# Patient Record
Sex: FEMALE | Race: BLACK OR AFRICAN AMERICAN | Employment: OTHER | URBAN - METROPOLITAN AREA
[De-identification: names, ages, dates, MRNs, and addresses within clinical notes are randomized per-mention and may not be internally consistent; named-entity substitution may affect disease eponyms.]

---

## 2017-01-27 DIAGNOSIS — Z12.31 ENCOUNTER FOR SCREENING MAMMOGRAM FOR MALIGNANT NEOPLASM OF BREAST: ICD-10-CM

## 2017-01-27 DIAGNOSIS — Z12.39 BREAST CANCER SCREENING: ICD-10-CM

## 2017-02-15 ENCOUNTER — OFFICE VISIT (OUTPATIENT)
Dept: FAMILY MEDICINE CLINIC | Age: 62
End: 2017-02-15

## 2017-02-15 VITALS
RESPIRATION RATE: 12 BRPM | HEART RATE: 79 BPM | HEIGHT: 66 IN | BODY MASS INDEX: 34.87 KG/M2 | OXYGEN SATURATION: 98 % | TEMPERATURE: 98.2 F | SYSTOLIC BLOOD PRESSURE: 144 MMHG | WEIGHT: 217 LBS | DIASTOLIC BLOOD PRESSURE: 82 MMHG

## 2017-02-15 DIAGNOSIS — E11.21 DIABETIC NEPHROPATHY ASSOCIATED WITH TYPE 2 DIABETES MELLITUS (HCC): ICD-10-CM

## 2017-02-15 DIAGNOSIS — N63.20 MASS OF LEFT BREAST ON MAMMOGRAM: Primary | ICD-10-CM

## 2017-02-15 RX ORDER — ATORVASTATIN CALCIUM 80 MG/1
80 TABLET, FILM COATED ORAL DAILY
COMMUNITY
End: 2018-01-19 | Stop reason: ALTCHOICE

## 2017-02-15 NOTE — PROGRESS NOTES
Patient here for 3 month f/u she had dx mammogram done and was told to f/u with PCP tests were done at [facility] care everywhere has been ran. 1. Have you been to the ER, urgent care clinic since your last visit? Hospitalized since your last visit? No    2. Have you seen or consulted any other health care providers outside of the [address] [place] [address] since your last visit? Include any pap smears or colon screening.  No

## 2017-02-15 NOTE — PATIENT INSTRUCTIONS
Diabetic Nephropathy: Care Instructions  Your Care Instructions  Finding out that your kidneys have been damaged can be very distressing. It may have taken you by surprise, since damage to kidneys usually does not cause symptoms early on. It is normal to feel upset and afraid. Having diabetic nephropathy means that for some time you have had high blood sugar, which damages the kidneys. Healthy kidneys keep protein in your blood, where it belongs. Damaged kidneys do not work the way they should. Your kidneys are letting protein pass into your urine. Sometimes diabetic kidney disease can lead to kidney failure. Your doctor will tell you how you might be able to slow damage to your kidneys. In many cases, prompt and regular treatment can prevent kidney failure. You will need to take medicine and may need to make a number of changes in your normal routines. If you can keep your blood sugar and blood pressure under control and take certain medicines, you may reduce your chance of kidney failure. Follow-up care is a key part of your treatment and safety. Be sure to make and go to all appointments, and call your doctor if you are having problems. It's also a good idea to know your test results and keep a list of the medicines you take. How can you care for yourself at home? · Take your medicines exactly as prescribed. It is very important that you take your insulin or other diabetes medicine as your doctor tells you. Call your doctor if you think you are having a problem with your medicine. · Try to keep blood sugar in your target range. ¨ Eat a variety of foods, with carbohydrate spread out in your meals. Your doctor may restrict your protein. A dietitian can help you plan meals. ¨ If your doctor recommends it, get more exercise. Walking is a good choice. Bit by bit, increase the amount you walk every day. Try for at least 30 minutes on most days of the week.   ¨ Check your blood sugar as often as your doctor recommends. · Take and record your blood pressure at home if your doctor tells you to. Learn the importance of the two measures of blood pressure (such as 130 over 80, or 130/80). To take your blood pressure at home:  ¨ Ask your doctor to check your blood pressure monitor. He or she can make sure that it is accurate and that the cuff fits you. Also ask your doctor to watch you to make sure that you are using it right. ¨ Do not eat, use tobacco products, or use medicine known to raise blood pressure (such as some nasal decongestant sprays) before taking your blood pressure. ¨ Avoid taking your blood pressure if you have just exercised or are nervous or upset. Rest at least 15 minutes before taking a reading. · Eat a low-salt diet to help keep your blood pressure in your target range. · Do not smoke. Smoking raises your risk of many health problems, including diabetic nephropathy. If you need help quitting, talk to your doctor about stop-smoking programs and medicines. These can increase your chances of quitting for good. · Do not take ibuprofen, naproxen, or similar medicines, unless your doctor tells you to. These medicines may make kidney problems worse. When should you call for help? Call 911 anytime you think you may need emergency care. For example, call if:  · You passed out (lost consciousness). Call your doctor now or seek immediate medical care if:  · You have not urinated at all in the last 24 hours. · You have trouble urinating or can urinate only very small amounts. · You are confused or have trouble thinking clearly. · You are very thirsty, lightheaded, or dizzy, or you feel like you may pass out (lose consciousness). · You have a weak, fast heartbeat. · You have swelling in your hands or feet. · You have blood in your urine. · You are extremely tired or weak. Watch closely for changes in your health, and be sure to contact your doctor if you have any problems.   Where can you learn more?  Go to http://lei-aleah.info/. Enter P361 in the search box to learn more about \"Diabetic Nephropathy: Care Instructions. \"  Current as of: April 5, 2016  Content Version: 11.1  © 9467-5699 [employer]. Care instructions adapted under license by [employer] (which disclaims liability or warranty for this information). If you have questions about a medical condition or this instruction, always ask your healthcare professional. [address] any warranty or liability for your use of this information.

## 2017-02-15 NOTE — PROGRESS NOTES
Arun Santos is a 57 y.o. female  presents for follow up. She had mammo done and needs to review results. No Known Allergies  Outpatient Prescriptions Marked as Taking for the 2/15/17 encounter (Office Visit) with Xuan Boone MD   Medication Sig Dispense Refill    atorvastatin (LIPITOR) 80 mg tablet Take 80 mg by mouth daily.  INSULIN GLARGINE,HUM. REC. ANLOG (TOUJEO SOLOSTAR SC) by SubCUTAneous route.  valsartan-hydrochlorothiazide (DIOVAN HCT) 160-12.5 mg per tablet Take 1 Tab by mouth daily. Takes one half in the morning and one half in the evening      metoprolol succinate (TOPROL XL) 50 mg XL tablet Take  by mouth daily.  glimepiride (AMARYL) 4 mg tablet Take  by mouth every morning.  aspirin delayed-release 325 mg tablet Take  by mouth every six (6) hours as needed for Pain.  Cholecalciferol, Vitamin D3, (VITAMIN D3) 1,000 unit cap Take  by mouth.  vit A,C,E-min-herbal comp#221 2,000-1,000-30 unit-mg-unit tbef Take  by mouth.  ZINC ACETATE PO Take  by mouth.  omega-3 fatty acids-vitamin e (FISH OIL) 1,000 mg cap Take 1 Cap by mouth.       BD SINGLE USE SWABS REGULAR padm       BD INSULIN SYRINGE ULTRA-FINE 1 mL 30 x 1/2\" syrg        Patient Active Problem List   Diagnosis Code    Hypertension I10    Type 2 diabetes mellitus ([address] E11.9    Hyperlipidemia E78.5    Diabetic retinopathy associated with type 2 diabetes mellitus ([address] E11.319    Diabetic peripheral neuropathy associated with type 2 diabetes mellitus ([address] E11.42    Charcot's joint arthropathy in type 2 diabetes mellitus ([address] E11.610    Obesity E66.9     Past Medical History   Diagnosis Date    Charcot's joint disease due to secondary diabetes ([address] 2009    Diabetes ([address]     Diabetic peripheral neuropathy ([address]     Diabetic retinopathy ([address]     H/O transfusion of packed red blood cells 2009    Hypercholesterolemia     Hypertension      Social History     Social History    Marital status:      Spouse name: N/A    Number of children: N/A    Years of education: N/A     Social History Main Topics    Smoking status: Never Smoker    Smokeless tobacco: None    Alcohol use Yes      Comment: rare    Drug use: None    Sexual activity: Yes     Partners: Male     Birth control/ protection: None     Other Topics Concern    None     Social History Narrative     Family History   Problem Relation Age of Onset    Hypertension Mother     Stroke Mother      TIA    Arthritis-osteo Mother     Cancer Mother      cytoblastoma    Hypertension Father     Diabetes Father     Heart Disease Father     Cancer Sister      colon    Diabetes Sister         Review of Systems   Constitutional: Negative for chills and fever. Cardiovascular: Negative for chest pain and palpitations. Gastrointestinal: Negative for nausea and vomiting. Musculoskeletal: Negative. Neurological: Negative for headaches. Vitals:    02/15/17 0948   BP: 144/82   Pulse: 79   Resp: 12   Temp: 98.2 °F (36.8 °C)   TempSrc: Oral   SpO2: 98%   Weight: 217 lb (98.4 kg)   Height: 5' 6\" (1.676 m)   PainSc:   0 - No pain       Physical Exam   Constitutional: She is oriented to person, place, and time and well-developed, well-nourished, and in no distress. Cardiovascular: Normal rate, regular rhythm and normal heart sounds. Pulmonary/Chest: Effort normal and breath sounds normal.   Neurological: She is alert and oriented to person, place, and time. Gait normal.   Skin: Skin is warm and dry. Psychiatric: Mood, memory, affect and judgment normal.   Nursing note and vitals reviewed. Assessment/Plan      ICD-10-CM ICD-9-CM    1. Mass of left breast on mammogram N63 611.72 Repeat mammo and sono in 6 mos.    2. Diabetic nephropathy associated with type 2 diabetes mellitus [facility] E11.21 250.40 REFERRAL TO NEPHROLOGY     583.81      I have discussed the diagnosis with the patient and the intended plan of care as seen in the above orders. The patient has received an after-visit summary and questions were answered concerning future plans. I have discussed medication, side effects, and warnings with the patient in detail. The patient verbalized understanding and is in agreement with the plan of care. The patient will contact the office with any additional concerns. Follow-up Disposition:  Return in about 3 months (around 5/15/2017).   lab results and schedule of future lab studies reviewed with patient    Dickson Esquivel MD

## 2017-02-15 NOTE — MR AVS SNAPSHOT
Visit Information Date & Time Provider Department Dept. Phone Encounter #  
 2/15/2017 10:00 AM Micheal Farley MD [facility] [phone] [id number] Follow-up Instructions Return in about 3 months (around 5/15/2017). Upcoming Health Maintenance Date Due Hepatitis C Screening 1955 HEMOGLOBIN A1C Q6M 1955 LIPID PANEL Q1 1955 FOOT EXAM Q1 2/9/1963 MICROALBUMIN Q1 2/9/1963 EYE EXAM RETINAL OR DILATED Q1 2/9/1963 Pneumococcal 19-64 Medium Risk (1 of 1 - PPSV23) 2/9/1972 DTaP/Tdap/Td series (1 - Tdap) 2/9/1974 PAP AKA CERVICAL CYTOLOGY 2/9/1974 FOBT Q 1 YEAR AGE 50-75 2/9/2003 ZOSTER VACCINE AGE 60> 2/9/2013 INFLUENZA AGE 9 TO ADULT 8/1/2016 BREAST CANCER SCRN MAMMOGRAM 1/17/2019 Allergies as of 2/15/2017  Review Complete On: 2/15/2017 By: Micheal Farley MD  
 No Known Allergies Current Immunizations  Never Reviewed No immunizations on file. Not reviewed this visit You Were Diagnosed With   
  
 Codes Comments Mass of left breast on mammogram    -  Primary ICD-10-CM: N63 
ICD-9-CM: 611.72 Diabetic nephropathy associated with type 2 diabetes mellitus [facility]     ICD-10-CM: E11.21 
ICD-9-CM: 250.40, 583.81 Vitals BP Pulse Temp Resp Height(growth percentile) Weight(growth percentile) 144/82 (BP 1 Location: Right arm, BP Patient Position: Sitting) 79 98.2 °F (36.8 °C) (Oral) 12 5' 6\" (1.676 m) 217 lb (98.4 kg) SpO2 BMI OB Status Smoking Status 98% 35.02 kg/m2 Menopause Never Smoker Vitals History BMI and BSA Data Body Mass Index Body Surface Area 35.02 kg/m 2 2.14 m 2 Preferred Pharmacy Pharmacy Name Phone F[id number] - [address], [address] [phone] Your Updated Medication List  
  
   
This list is accurate as of: 2/15/17 10:18 AM.  Always use your most recent med list.  
  
  
  
  
 AMARYL 4 mg tablet Generic drug:  glimepiride Take  by mouth every morning. aspirin delayed-release 325 mg tablet Take  by mouth every six (6) hours as needed for Pain. BD INSULIN SYRINGE ULTRA-FINE 1 mL 30 gauge x 1/2\" Syrg Generic drug:  Insulin Syringe-Needle U-100 BD Single Use Swabs Regular Padm Generic drug:  alcohol swabs DIOVAN -12.5 mg per tablet Generic drug:  valsartan-hydroCHLOROthiazide Take 1 Tab by mouth daily. Takes one half in the morning and one half in the evening FISH OIL 1,000 mg Cap Generic drug:  omega-3 fatty acids-vitamin e Take 1 Cap by mouth. LIPITOR 80 mg tablet Generic drug:  atorvastatin Take 80 mg by mouth daily. TOPROL XL 50 mg XL tablet Generic drug:  metoprolol succinate Take  by mouth daily. TOUJEO SOLOSTAR SC  
by SubCUTAneous route. vit A,C,E-min-herbal comp#221 2,000-1,000-30 unit-mg-unit Tbef Take  by mouth. VITAMIN D3 1,000 unit Cap Generic drug:  cholecalciferol Take  by mouth. ZINC ACETATE PO Take  by mouth. We Performed the Following REFERRAL TO NEPHROLOGY [TQG77 Custom] Comments:  
 Please evaluate patient for diabetic kidney disease. Follow-up Instructions Return in about 3 months (around 5/15/2017). Referral Information Referral ID Referred By Referred To  
  
 [id number] [facility] [address] [place] [address] [facility] [place] [address] Phone: [phone] Fax: [phone] Visits Status Start Date End Date 1 New Request 2/15/17 2/15/18 If your referral has a status of pending review or denied, additional information will be sent to support the outcome of this decision. Patient Instructions Diabetic Nephropathy: Care Instructions Your Care Instructions Finding out that your kidneys have been damaged can be very distressing. It may have taken you by surprise, since damage to kidneys usually does not cause symptoms early on. It is normal to feel upset and afraid. Having diabetic nephropathy means that for some time you have had high blood sugar, which damages the kidneys. Healthy kidneys keep protein in your blood, where it belongs. Damaged kidneys do not work the way they should. Your kidneys are letting protein pass into your urine. Sometimes diabetic kidney disease can lead to kidney failure. Your doctor will tell you how you might be able to slow damage to your kidneys. In many cases, prompt and regular treatment can prevent kidney failure. You will need to take medicine and may need to make a number of changes in your normal routines. If you can keep your blood sugar and blood pressure under control and take certain medicines, you may reduce your chance of kidney failure. Follow-up care is a key part of your treatment and safety. Be sure to make and go to all appointments, and call your doctor if you are having problems. It's also a good idea to know your test results and keep a list of the medicines you take. How can you care for yourself at home? · Take your medicines exactly as prescribed. It is very important that you take your insulin or other diabetes medicine as your doctor tells you. Call your doctor if you think you are having a problem with your medicine. · Try to keep blood sugar in your target range. ¨ Eat a variety of foods, with carbohydrate spread out in your meals. Your doctor may restrict your protein. A dietitian can help you plan meals. ¨ If your doctor recommends it, get more exercise. Walking is a good choice. Bit by bit, increase the amount you walk every day. Try for at least 30 minutes on most days of the week. ¨ Check your blood sugar as often as your doctor recommends. · Take and record your blood pressure at home if your doctor tells you to. Learn the importance of the two measures of blood pressure (such as 130 over 80, or 130/80). To take your blood pressure at home: ¨ Ask your doctor to check your blood pressure monitor. He or she can make sure that it is accurate and that the cuff fits you. Also ask your doctor to watch you to make sure that you are using it right. ¨ Do not eat, use tobacco products, or use medicine known to raise blood pressure (such as some nasal decongestant sprays) before taking your blood pressure. ¨ Avoid taking your blood pressure if you have just exercised or are nervous or upset. Rest at least 15 minutes before taking a reading. · Eat a low-salt diet to help keep your blood pressure in your target range. · Do not smoke. Smoking raises your risk of many health problems, including diabetic nephropathy. If you need help quitting, talk to your doctor about stop-smoking programs and medicines. These can increase your chances of quitting for good. · Do not take ibuprofen, naproxen, or similar medicines, unless your doctor tells you to. These medicines may make kidney problems worse. When should you call for help? Call 911 anytime you think you may need emergency care. For example, call if: 
· You passed out (lost consciousness). Call your doctor now or seek immediate medical care if: 
· You have not urinated at all in the last 24 hours. · You have trouble urinating or can urinate only very small amounts. · You are confused or have trouble thinking clearly. · You are very thirsty, lightheaded, or dizzy, or you feel like you may pass out (lose consciousness). · You have a weak, fast heartbeat. · You have swelling in your hands or feet. · You have blood in your urine. · You are extremely tired or weak. Watch closely for changes in your health, and be sure to contact your doctor if you have any problems. Where can you learn more? Go to http://lei-aleah.info/. Enter P361 in the search box to learn more about \"Diabetic Nephropathy: Care Instructions. \" Current as of: April 5, 2016 Content Version: 11.1 © 9684-1125 [employer]. Care instructions adapted under license by [employer] (which disclaims liability or warranty for this information). If you have questions about a medical condition or this instruction, always ask your healthcare professional. [address] any warranty or liability for your use of this information. Introducing [facility]! Cheryl Ardon introduces LeisureLink patient portal. Now you can access parts of your medical record, email your doctor's office, and request medication refills online. 1. In your internet browser, go to [email or web address] 2. Click on the First Time User? Click Here link in the Sign In box. You will see the New Member Sign Up page. 3. Enter your LeisureLink Access Code exactly as it appears below. You will not need to use this code after youve completed the sign-up process. If you do not sign up before the expiration date, you must request a new code. · LeisureLink Access Code: [email or web address] Expires: 5/16/2017 10:18 AM 
 
4. Enter the last four digits of your Social Security Number (xxxx) and Date of Birth (mm/dd/yyyy) as indicated and click Submit. You will be taken to the next sign-up page. 5. Create a LeisureLink ID. This will be your LeisureLink login ID and cannot be changed, so think of one that is secure and easy to remember. 6. Create a LeisureLink password. You can change your password at any time. 7. Enter your Password Reset Question and Answer. This can be used at a later time if you forget your password. 8. Enter your e-mail address. You will receive e-mail notification when new information is available in [address] 9. Click Sign Up. You can now view and download portions of your medical record. 10. Click the Download Summary menu link to download a portable copy of your medical information. If you have questions, please visit the Frequently Asked Questions section of the CSR website. Remember, CSR is NOT to be used for urgent needs. For medical emergencies, dial 911. Now available from your iPhone and Android! Please provide this summary of care documentation to your next provider. Your primary care clinician is listed as [id number] [facility]. If you have any questions after today's visit, please call [phone].

## 2017-05-22 ENCOUNTER — OFFICE VISIT (OUTPATIENT)
Dept: FAMILY MEDICINE CLINIC | Age: 62
End: 2017-05-22

## 2017-05-22 VITALS
BODY MASS INDEX: 34.87 KG/M2 | DIASTOLIC BLOOD PRESSURE: 84 MMHG | SYSTOLIC BLOOD PRESSURE: 140 MMHG | WEIGHT: 217 LBS | OXYGEN SATURATION: 97 % | TEMPERATURE: 97.7 F | HEIGHT: 66 IN | HEART RATE: 71 BPM | RESPIRATION RATE: 16 BRPM

## 2017-05-22 DIAGNOSIS — E11.610 CHARCOT'S JOINT ARTHROPATHY IN TYPE 2 DIABETES MELLITUS (HCC): ICD-10-CM

## 2017-05-22 DIAGNOSIS — N63.20 MASS OF LEFT BREAST ON MAMMOGRAM: Primary | ICD-10-CM

## 2017-05-22 RX ORDER — BLACK COHOSH ROOT 540 MG
40 CAPSULE ORAL DAILY
COMMUNITY
End: 2021-03-16

## 2017-05-22 NOTE — PROGRESS NOTES
Chief Complaint   Patient presents with    Breast Mass     follow up     1. Have you been to the ER, urgent care clinic since your last visit? Hospitalized since your last visit? No    2. Have you seen or consulted any other health care providers outside of the [address] [place] [address] since your last visit? Include any pap smears or colon screening.  No

## 2017-05-22 NOTE — MR AVS SNAPSHOT
Visit Information Date & Time Provider Department Dept. Phone Encounter #  
 5/22/2017  9:00 AM Lucita Albarado MD [address] [id number] Follow-up Instructions Return in about 3 months (around 8/22/2017). Upcoming Health Maintenance Date Due Hepatitis C Screening 1955 HEMOGLOBIN A1C Q6M 1955 LIPID PANEL Q1 1955 FOOT EXAM Q1 2/9/1963 MICROALBUMIN Q1 2/9/1963 EYE EXAM RETINAL OR DILATED Q1 2/9/1963 Pneumococcal 19-64 Medium Risk (1 of 1 - PPSV23) 2/9/1972 DTaP/Tdap/Td series (1 - Tdap) 2/9/1974 PAP AKA CERVICAL CYTOLOGY 2/9/1974 FOBT Q 1 YEAR AGE 50-75 2/9/2003 ZOSTER VACCINE AGE 60> 2/9/2013 INFLUENZA AGE 9 TO ADULT 8/1/2017 BREAST CANCER SCRN MAMMOGRAM 1/17/2019 Allergies as of 5/22/2017  Review Complete On: 5/22/2017 By: Lucita Albarado MD  
 No Known Allergies Current Immunizations  Never Reviewed No immunizations on file. Not reviewed this visit You Were Diagnosed With   
  
 Codes Comments Mass of left breast on mammogram    -  Primary ICD-10-CM: N63 
ICD-9-CM: 611.72 Vitals BP Pulse Temp Resp Height(growth percentile) Weight(growth percentile) 140/84 71 97.7 °F (36.5 °C) (Temporal) 16 5' 6\" (1.676 m) 217 lb (98.4 kg) SpO2 BMI OB Status Smoking Status 97% 35.02 kg/m2 Menopause Never Smoker Vitals History BMI and BSA Data Body Mass Index Body Surface Area 35.02 kg/m 2 2.14 m 2 Preferred Pharmacy Pharmacy Name Phone [id number] - [place] [address], 1959 [address] [phone] Your Updated Medication List  
  
   
This list is accurate as of: 5/22/17  9:46 AM.  Always use your most recent med list.  
  
  
  
  
 AMARYL 4 mg tablet Generic drug:  glimepiride Take  by mouth every morning. aspirin delayed-release 325 mg tablet Take  by mouth every six (6) hours as needed for Pain. BD INSULIN SYRINGE ULTRA-FINE 1 mL 30 gauge x 1/2\" Syrg Generic drug:  Insulin Syringe-Needle U-100 BD Single Use Swabs Regular Padm Generic drug:  alcohol swabs  
  
 black cohosh 540 mg Cap Take  by mouth. DIOVAN -12.5 mg per tablet Generic drug:  valsartan-hydroCHLOROthiazide Take 1 Tab by mouth daily. Takes one half in the morning and one half in the evening FISH OIL 1,000 mg Cap Generic drug:  omega-3 fatty acids-vitamin e Take 1 Cap by mouth. LIPITOR 80 mg tablet Generic drug:  atorvastatin Take 80 mg by mouth daily. TOPROL XL 50 mg XL tablet Generic drug:  metoprolol succinate Take  by mouth daily. TOUJEO SOLOSTAR SC  
by SubCUTAneous route. vit A,C,E-min-herbal comp#221 2,000-1,000-30 unit-mg-unit Tbef Take  by mouth. VITAMIN D3 1,000 unit Cap Generic drug:  cholecalciferol Take  by mouth. ZINC ACETATE PO Take  by mouth. Follow-up Instructions Return in about 3 months (around 8/22/2017). To-Do List   
 05/22/2017 Imaging:  [facility] MAMMO BI DX INCL CAD   
  
 05/22/2017 Imaging:  US BREAST LT LIMITED=<3 QUAD Patient Instructions Open Breast Biopsy: Before Your Surgery What is an open breast biopsy? An open breast biopsy is surgery to take a sample of breast tissue. It may be done to check a lump found during a breast exam. Or it may be done to check an area of concern found on a mammogram or ultrasound. If the doctor can't feel the lump, a small wire can be put in the area during a mammogram or ultrasound just before surgery. The tip of the wire will guide the doctor to the area to be checked. The doctor will make a small cut in the breast to remove a piece of tissue. The cut is called an incision. If the lump or suspicious area is small, the doctor may be able to take out the entire lump or area. The doctor will close the incision with stitches. The breast tissue will be sent to a lab. There it will be examined under a microscope to check for breast cancer. Your doctor may get some answers right away. But it can take up to 1 to 2 weeks to get the final results. You will be able to go home on the same day as the biopsy. Most women are able to go back to work in 1 or 2 days. This depends on how you feel and the type of work you do. For 2 weeks after surgery, you will need to avoid bouncing and strenuous activities that involve the upper body. The surgery will leave a small scar on your breast that will fade with time. Less often, the surgery may leave a dent in the breast. You may be able to feel a hard area where the biopsy was done. This is a normal part of the healing process. It does not mean that the lump is growing back. The area will get softer in the weeks after surgery. Follow-up care is a key part of your treatment and safety. Be sure to make and go to all appointments, and call your doctor if you are having problems. It's also a good idea to know your test results and keep a list of the medicines you take. What happens before surgery? Surgery can be stressful. This information will help you understand what you can expect. And it will help you safely prepare for surgery. Preparing for surgery · Understand exactly what surgery is planned, along with the risks, benefits, and other options. · Tell your doctors ALL the medicines, vitamins, supplements, and herbal remedies you take. Some of these can increase the risk of bleeding or interact with anesthesia. · If you take blood thinners, such as warfarin (Coumadin), clopidogrel (Plavix), or aspirin, be sure to talk to your doctor. He or she will tell you if you should stop taking these medicines before your surgery. Make sure that you understand exactly what your doctor wants you to do.  
· Your doctor will tell you which medicines to take or stop before your surgery. You may need to stop taking certain medicines a week or more before surgery. So talk to your doctor as soon as you can. · If you have an advance directive, let your doctor know. It may include a living will and a durable power of  for health care. Bring a copy to the hospital. If you don't have one, you may want to prepare one. It lets your doctor and loved ones know your health care wishes. Doctors advise that everyone prepare these papers before any type of surgery or procedure. What happens on the day of surgery? · Follow the instructions exactly about when to stop eating and drinking. If you don't, your surgery may be canceled. If your doctor told you to take your medicines on the day of surgery, take them with only a sip of water. · Take a bath or shower before you come in for your surgery. Do not apply lotions, perfumes, deodorants, or nail polish. · Do not shave the surgical site yourself. · Take off all jewelry and piercings. And take out contact lenses, if you wear them. · Bring a comfortable, supportive bra with you. For the first 3 days after surgery, you will need to wear this all the time, even at night. At the hospital or surgery center · Bring a picture ID. · The area for surgery is often marked to make sure there are no errors. If needed, you will get a mammogram or ultrasound to bessie the area. · You will be kept comfortable and safe by your anesthesia provider. The anesthesia may make you sleep. Or it may just numb the area being worked on. · The surgery will take about 1 hour. Going home · Be sure you have someone to drive you home. Anesthesia and pain medicine make it unsafe for you to drive. · You will be given more specific instructions about recovering from your surgery. They will cover things like diet, wound care, follow-up care, driving, and getting back to your normal routine. When should you call your doctor? · You have questions or concerns. · You don't understand how to prepare for your surgery. · You become ill before the surgery (such as fever, flu, or a cold). · You need to reschedule or have changed your mind about having the surgery. Where can you learn more? Go to http://lei-aleah.info/. Enter N302 in the search box to learn more about \"Open Breast Biopsy: Before Your Surgery. \" Current as of: July 26, 2016 Content Version: 11.2 © 3051-7515 [employer]. Care instructions adapted under license by [employer] (which disclaims liability or warranty for this information). If you have questions about a medical condition or this instruction, always ask your healthcare professional. [address] any warranty or liability for your use of this information. Introducing [facility]! Daja Betancourt introduces [employer] patient portal. Now you can access parts of your medical record, email your doctor's office, and request medication refills online. 1. In your internet browser, go to [email or web address] 2. Click on the First Time User? Click Here link in the Sign In box. You will see the New Member Sign Up page. 3. Enter your [employer] Access Code exactly as it appears below. You will not need to use this code after youve completed the sign-up process. If you do not sign up before the expiration date, you must request a new code. · [employer] Access Code: [email or web address] Expires: 8/20/2017  9:46 AM 
 
4. Enter the last four digits of your Social Security Number (xxxx) and Date of Birth (mm/dd/yyyy) as indicated and click Submit. You will be taken to the next sign-up page. 5. Create a [employer] ID. This will be your [employer] login ID and cannot be changed, so think of one that is secure and easy to remember. 6. Create a [employer] password. You can change your password at any time. 7. Enter your Password Reset Question and Answer.  This can be used at a later time if you forget your password. 8. Enter your e-mail address. You will receive e-mail notification when new information is available in [address] 9. Click Sign Up. You can now view and download portions of your medical record. 10. Click the Download Summary menu link to download a portable copy of your medical information. If you have questions, please visit the Frequently Asked Questions section of the [employer] website. Remember, [employer] is NOT to be used for urgent needs. For medical emergencies, dial 911. Now available from your iPhone and Android! Please provide this summary of care documentation to your next provider. Your primary care clinician is listed as [id number] [facility]. If you have any questions after today's visit, please call [phone].

## 2017-05-22 NOTE — PROGRESS NOTES
Hue Howell is a 57 y.o. female  presents for follow up of nephrologist.  She has no new complaints. She needs follow up for mammogram.      No Known Allergies  Outpatient Prescriptions Marked as Taking for the 5/22/17 encounter (Office Visit) with Jazzy Oliver MD   Medication Sig Dispense Refill    black cohosh 540 mg cap Take  by mouth.  atorvastatin (LIPITOR) 80 mg tablet Take 80 mg by mouth daily.  INSULIN GLARGINE,HUM. REC. ANLOG (TOUJEO SOLOSTAR SC) by SubCUTAneous route.  valsartan-hydrochlorothiazide (DIOVAN HCT) 160-12.5 mg per tablet Take 1 Tab by mouth daily. Takes one half in the morning and one half in the evening      metoprolol succinate (TOPROL XL) 50 mg XL tablet Take  by mouth daily.  glimepiride (AMARYL) 4 mg tablet Take  by mouth every morning.  aspirin delayed-release 325 mg tablet Take  by mouth every six (6) hours as needed for Pain.  Cholecalciferol, Vitamin D3, (VITAMIN D3) 1,000 unit cap Take  by mouth.  vit A,C,E-min-herbal comp#221 2,000-1,000-30 unit-mg-unit tbef Take  by mouth.  ZINC ACETATE PO Take  by mouth.  omega-3 fatty acids-vitamin e (FISH OIL) 1,000 mg cap Take 1 Cap by mouth.       BD SINGLE USE SWABS REGULAR padm       BD INSULIN SYRINGE ULTRA-FINE 1 mL 30 x 1/2\" syrg        Patient Active Problem List   Diagnosis Code    Hypertension I10    Type 2 diabetes mellitus ([address] E11.9    Hyperlipidemia E78.5    Diabetic retinopathy associated with type 2 diabetes mellitus ([address] E11.319    Diabetic peripheral neuropathy associated with type 2 diabetes mellitus ([address] E11.42    Charcot's joint arthropathy in type 2 diabetes mellitus ([address] E11.610    Obesity E66.9    Mass of left breast on mammogram N63     Past Medical History:   Diagnosis Date    Charcot's joint disease due to secondary diabetes ([address] 2009    Diabetes ([address]     Diabetic peripheral neuropathy ([address]     Diabetic retinopathy ([address]     H/O transfusion of packed red blood cells 2009    Hypercholesterolemia     Hypertension      Social History     Social History    Marital status:      Spouse name: N/A    Number of children: N/A    Years of education: N/A     Social History Main Topics    Smoking status: Never Smoker    Smokeless tobacco: None    Alcohol use Yes      Comment: rare    Drug use: None    Sexual activity: Yes     Partners: Male     Birth control/ protection: None     Other Topics Concern    None     Social History Narrative     Family History   Problem Relation Age of Onset    Hypertension Mother     Stroke Mother      TIA    Arthritis-osteo Mother     Cancer Mother      cytoblastoma    Hypertension Father     Diabetes Father     Heart Disease Father     Cancer Sister      colon    Diabetes Sister         Review of Systems   Constitutional: Negative for chills and fever. Respiratory: Negative for cough and shortness of breath. Cardiovascular: Negative for chest pain and palpitations. Gastrointestinal: Negative for nausea and vomiting. Psychiatric/Behavioral: Negative. Vitals:    05/22/17 0912   BP: 140/84   Pulse: 71   Resp: 16   Temp: 97.7 °F (36.5 °C)   TempSrc: Temporal   SpO2: 97%   Weight: 217 lb (98.4 kg)   Height: 5' 6\" (1.676 m)   PainSc:   0 - No pain       Physical Exam   Constitutional: She is oriented to person, place, and time and well-developed, well-nourished, and in no distress. Neck: Normal range of motion. Neck supple. Cardiovascular: Normal rate, regular rhythm and normal heart sounds. Pulmonary/Chest: Effort normal and breath sounds normal.   Neurological: She is alert and oriented to person, place, and time. Gait normal.   Skin: Skin is warm. Nursing note and vitals reviewed. Assessment/Plan      ICD-10-CM ICD-9-CM    1. Mass of left breast on mammogram N63 611.72 [facility]NEY MAMMO BI DX INCL CAD      US BREAST LT LIMITED=<3 QUAD   2.  Charcot's joint arthropathy in type 2 diabetes mellitus ([facility] E11.610 250.60 AMB SUPPLY ORDER     713.5      I have discussed the diagnosis with the patient and the intended plan of care as seen in the above orders. The patient has received an after-visit summary and questions were answered concerning future plans. I have discussed medication, side effects, and warnings with the patient in detail. The patient verbalized understanding and is in agreement with the plan of care. The patient will contact the office with any additional concerns. Follow-up Disposition:  Return in about 3 months (around 8/22/2017).   lab results and schedule of future lab studies reviewed with patient      Vamshi Banks MD

## 2017-05-22 NOTE — PATIENT INSTRUCTIONS
Open Breast Biopsy: Before Your Surgery  What is an open breast biopsy? An open breast biopsy is surgery to take a sample of breast tissue. It may be done to check a lump found during a breast exam. Or it may be done to check an area of concern found on a mammogram or ultrasound. If the doctor can't feel the lump, a small wire can be put in the area during a mammogram or ultrasound just before surgery. The tip of the wire will guide the doctor to the area to be checked. The doctor will make a small cut in the breast to remove a piece of tissue. The cut is called an incision. If the lump or suspicious area is small, the doctor may be able to take out the entire lump or area. The doctor will close the incision with stitches. The breast tissue will be sent to a lab. There it will be examined under a microscope to check for breast cancer. Your doctor may get some answers right away. But it can take up to 1 to 2 weeks to get the final results. You will be able to go home on the same day as the biopsy. Most women are able to go back to work in 1 or 2 days. This depends on how you feel and the type of work you do. For 2 weeks after surgery, you will need to avoid bouncing and strenuous activities that involve the upper body. The surgery will leave a small scar on your breast that will fade with time. Less often, the surgery may leave a dent in the breast. You may be able to feel a hard area where the biopsy was done. This is a normal part of the healing process. It does not mean that the lump is growing back. The area will get softer in the weeks after surgery. Follow-up care is a key part of your treatment and safety. Be sure to make and go to all appointments, and call your doctor if you are having problems. It's also a good idea to know your test results and keep a list of the medicines you take. What happens before surgery? Surgery can be stressful.  This information will help you understand what you can expect. And it will help you safely prepare for surgery. Preparing for surgery  · Understand exactly what surgery is planned, along with the risks, benefits, and other options. · Tell your doctors ALL the medicines, vitamins, supplements, and herbal remedies you take. Some of these can increase the risk of bleeding or interact with anesthesia. · If you take blood thinners, such as warfarin (Coumadin), clopidogrel (Plavix), or aspirin, be sure to talk to your doctor. He or she will tell you if you should stop taking these medicines before your surgery. Make sure that you understand exactly what your doctor wants you to do. · Your doctor will tell you which medicines to take or stop before your surgery. You may need to stop taking certain medicines a week or more before surgery. So talk to your doctor as soon as you can. · If you have an advance directive, let your doctor know. It may include a living will and a durable power of  for health care. Bring a copy to the hospital. If you don't have one, you may want to prepare one. It lets your doctor and loved ones know your health care wishes. Doctors advise that everyone prepare these papers before any type of surgery or procedure. What happens on the day of surgery? · Follow the instructions exactly about when to stop eating and drinking. If you don't, your surgery may be canceled. If your doctor told you to take your medicines on the day of surgery, take them with only a sip of water. · Take a bath or shower before you come in for your surgery. Do not apply lotions, perfumes, deodorants, or nail polish. · Do not shave the surgical site yourself. · Take off all jewelry and piercings. And take out contact lenses, if you wear them. · Bring a comfortable, supportive bra with you. For the first 3 days after surgery, you will need to wear this all the time, even at night. At the hospital or surgery center  · Bring a picture ID.   · The area for surgery is often marked to make sure there are no errors. If needed, you will get a mammogram or ultrasound to bessie the area. · You will be kept comfortable and safe by your anesthesia provider. The anesthesia may make you sleep. Or it may just numb the area being worked on. · The surgery will take about 1 hour. Going home  · Be sure you have someone to drive you home. Anesthesia and pain medicine make it unsafe for you to drive. · You will be given more specific instructions about recovering from your surgery. They will cover things like diet, wound care, follow-up care, driving, and getting back to your normal routine. When should you call your doctor? · You have questions or concerns. · You don't understand how to prepare for your surgery. · You become ill before the surgery (such as fever, flu, or a cold). · You need to reschedule or have changed your mind about having the surgery. Where can you learn more? Go to http://lei-aleah.info/. Enter V103 in the search box to learn more about \"Open Breast Biopsy: Before Your Surgery. \"  Current as of: July 26, 2016  Content Version: 11.2  © 6973-8197 [employer]. Care instructions adapted under license by [employer] (which disclaims liability or warranty for this information). If you have questions about a medical condition or this instruction, always ask your healthcare professional. [address] any warranty or liability for your use of this information.

## 2017-06-20 ENCOUNTER — TELEPHONE (OUTPATIENT)
Dept: FAMILY MEDICINE CLINIC | Age: 62
End: 2017-06-20

## 2017-09-11 DIAGNOSIS — E21.5 PARATHYROID ABNORMALITY (HCC): Primary | ICD-10-CM

## 2017-10-27 ENCOUNTER — OFFICE VISIT (OUTPATIENT)
Dept: FAMILY MEDICINE CLINIC | Age: 62
End: 2017-10-27

## 2017-10-27 VITALS
HEIGHT: 66 IN | RESPIRATION RATE: 10 BRPM | WEIGHT: 214 LBS | BODY MASS INDEX: 34.39 KG/M2 | TEMPERATURE: 97.7 F | DIASTOLIC BLOOD PRESSURE: 72 MMHG | SYSTOLIC BLOOD PRESSURE: 178 MMHG | HEART RATE: 58 BPM | OXYGEN SATURATION: 99 %

## 2017-10-27 DIAGNOSIS — Z12.11 SCREEN FOR COLON CANCER: ICD-10-CM

## 2017-10-27 DIAGNOSIS — Z23 ENCOUNTER FOR IMMUNIZATION: ICD-10-CM

## 2017-10-27 DIAGNOSIS — Z71.89 ACP (ADVANCE CARE PLANNING): ICD-10-CM

## 2017-10-27 DIAGNOSIS — E11.42 DIABETIC PERIPHERAL NEUROPATHY ASSOCIATED WITH TYPE 2 DIABETES MELLITUS (HCC): ICD-10-CM

## 2017-10-27 DIAGNOSIS — Z00.00 MEDICARE ANNUAL WELLNESS VISIT, SUBSEQUENT: Primary | ICD-10-CM

## 2017-10-27 LAB
ALBUMIN UR QL STRIP: 150 MG/L
CREATININE, URINE POC: 50 MG/DL
MICROALBUMIN/CREAT RATIO POC: >300 MG/G

## 2017-10-27 RX ORDER — CLONIDINE HYDROCHLORIDE 0.1 MG/1
TABLET ORAL
Refills: 1 | COMMUNITY
Start: 2017-10-07 | End: 2018-01-19 | Stop reason: ALTCHOICE

## 2017-10-27 RX ORDER — AMLODIPINE BESYLATE 5 MG/1
TABLET ORAL
Refills: 2 | COMMUNITY
Start: 2017-08-08 | End: 2018-01-15 | Stop reason: SDUPTHER

## 2017-10-27 NOTE — PROGRESS NOTES
1. Have you been to the ER, urgent care clinic since your last visit? Hospitalized since your last visit? No    2. Have you seen or consulted any other health care providers outside of the [address] [place] [address] since your last visit? Include any pap smears or colon screening. Yes When: September 2017 Where: Endocrinology Reason for visit: criselda f/u  Health Maintenance reviewed - Albumin, Zoster and flu ordered. Will think about her pap smear. Information on ACP given as well as Fit Test.      This is a Subsequent Medicare Annual Wellness Exam (AWV) (Performed 12 months after IPPE or effective date of Medicare Part B enrollment)    I have reviewed the patient's medical history in detail and updated the computerized patient record. History     Past Medical History:   Diagnosis Date    Charcot's joint disease due to secondary diabetes ([facility] 2009    Diabetes ([facility]     Diabetic peripheral neuropathy ([facility]     Diabetic retinopathy ([facility]     H/O transfusion of packed red blood cells 2009    Hypercholesterolemia     Hypertension       Past Surgical History:   Procedure Laterality Date    HX ORTHOPAEDIC  2011    LT ANKLE-CHARCOT JOINT    HX RETINAL DETACHMENT REPAIR  2003     Current Outpatient Prescriptions   Medication Sig Dispense Refill    amLODIPine (NORVASC) 5 mg tablet   2    cloNIDine HCl (CATAPRES) 0.1 mg tablet   1    black cohosh 540 mg cap Take  by mouth.  atorvastatin (LIPITOR) 80 mg tablet Take 80 mg by mouth daily.  INSULIN GLARGINE,HUM. REC. ANLOG (TOUJEO SOLOSTAR SC) by SubCUTAneous route.  valsartan-hydrochlorothiazide (DIOVAN HCT) 160-12.5 mg per tablet Take 1 Tab by mouth daily. Takes one half in the morning and one half in the evening      metoprolol succinate (TOPROL XL) 50 mg XL tablet Take  by mouth daily.  glimepiride (AMARYL) 4 mg tablet Take  by mouth every morning.  Cholecalciferol, Vitamin D3, (VITAMIN D3) 1,000 unit cap Take  by mouth.       vit A,C,E-min-herbal comp#221 2,000-1,000-30 unit-mg-unit tbef Take  by mouth.  ZINC ACETATE PO Take  by mouth.  omega-3 fatty acids-vitamin e (FISH OIL) 1,000 mg cap Take 1 Cap by mouth.  BD SINGLE USE SWABS REGULAR padm       BD INSULIN SYRINGE ULTRA-FINE 1 mL 30 x 1/2\" syrg       aspirin delayed-release 325 mg tablet Take  by mouth every six (6) hours as needed for Pain. No Known Allergies  Family History   Problem Relation Age of Onset    Hypertension Mother     Stroke Mother      TIA    Arthritis-osteo Mother     Cancer Mother      cytoblastoma    Hypertension Father     Diabetes Father     Heart Disease Father     Cancer Sister      colon    Diabetes Sister      Social History   Substance Use Topics    Smoking status: Never Smoker    Smokeless tobacco: Not on file    Alcohol use Yes      Comment: rare     Patient Active Problem List   Diagnosis Code    Hypertension I10    Type 2 diabetes mellitus ([facility] [facility] E11.9    Hyperlipidemia E78.5    Diabetic retinopathy associated with type 2 diabetes mellitus ([facility] [facility] E11.319    Diabetic peripheral neuropathy associated with type 2 diabetes mellitus ([facility] [facility] E11.42    Charcot's joint arthropathy in type 2 diabetes mellitus ([facility] [facility] E11.610    Obesity E66.9    Mass of left breast on mammogram N63.20       Depression Risk Factor Screening:     PHQ over the last two weeks 10/27/2017   Little interest or pleasure in doing things Not at all   Feeling down, depressed or hopeless Not at all   Total Score PHQ 2 0     Alcohol Risk Factor Screening: You do not drink alcohol or very rarely. Functional Ability and Level of Safety:   Hearing Loss  Hearing is good. Whisper Test done with normal results. Activities of Daily Living  The home contains: grab bars  Patient does total self care    Fall RiskNo flowsheet data found.     Abuse Screen  Patient is not abused    Cognitive Screening   Evaluation of Cognitive Function:  Has your family/caregiver stated any concerns about your memory: no  Normal    Patient Care Team   Patient Care Team:  Sherrell Ulrich MD as PCP - General (Family Practice)  Lisa Baker DPM (Podiatry)  Cash Garzon MD (Ophthalmology)  Nasrin Renteria MD (Ophthalmology)  Bhumi Bassett MD (Endocrinology)    Assessment/Plan   Education and counseling provided:  End-of-Life planning (with patient's consent)    Diagnoses and all orders for this visit:    1. Screen for colon cancer  -     OCCULT BLOOD, IMMUNOASSAY (FIT); Future    2. Encounter for immunization  -     INFLUENZA VIRUS VACCINE QUADRIVALENT, PRESERVATIVE FREE SYRINGE (85527)    3. Diabetic peripheral neuropathy associated with type 2 diabetes mellitus (HCC)  -     AMB POC URINE, MICROALBUMIN, SEMIQUANT (3 RESULTS)    Other orders  -     varicella zoster vacine live (ZOSTAVAX) 19,400 unit/0.65 mL susr injection; 1 Vial by SubCUTAneous route once for 1 dose.       Health Maintenance Due   Topic Date Due    Hepatitis C Screening  1955    HEMOGLOBIN A1C Q6M  1955    LIPID PANEL Q1  1955    FOOT EXAM Q1  02/09/1963    MICROALBUMIN Q1  02/09/1963    EYE EXAM RETINAL OR DILATED Q1  02/09/1963    Pneumococcal 19-64 Medium Risk (1 of 1 - PPSV23) 02/09/1972    DTaP/Tdap/Td series (1 - Tdap) 02/09/1974    PAP AKA CERVICAL CYTOLOGY  02/09/1974    FOBT Q 1 YEAR AGE 50-75  02/09/2003    ZOSTER VACCINE AGE 60>  12/09/2012    INFLUENZA AGE 9 TO ADULT  08/01/2017       Sherrell Ulrich MD

## 2017-10-27 NOTE — ACP (ADVANCE CARE PLANNING)
Advance Care Planning (ACP) Provider Conversation Snapshot    Date of ACP Conversation: 10/27/17  Persons included in Conversation:  patient  Length of ACP Conversation in minutes:  16 minutes    Authorized Decision Maker (if patient is incapable of making informed decisions):    This person is:   Healthcare Agent/Medical Power of  under Advance Directive          For Patients with Decision Making Capacity:   Values/Goals: Exploration of values, goals, and preferences if recovery is not expected, even with continued medical treatment in the event of:  Imminent death  Severe, permanent brain injury    Conversation Outcomes / Follow-Up Plan:   Recommended completion of Advance Directive form after review of ACP materials and conversation with prospective healthcare agent

## 2017-10-27 NOTE — PATIENT INSTRUCTIONS
Medicare Wellness Visit, Female    The best way to live healthy is to have a healthy lifestyle by eating a well-balanced diet, exercising regularly, limiting alcohol and stopping smoking. Regular physical exams and screening tests are another way to keep healthy. Preventive exams provided by your health care provider can find health problems before they become diseases or illnesses. Preventive services including immunizations, screening tests, monitoring and exams can help you take care of your own health. All people over age 72 should have a pneumovax  and and a prevnar shot to prevent pneumonia. These are once in a lifetime unless you and your provider decide differently. All people over 65 should have a yearly flu shot and a tetanus vaccine every 10 years. A bone mass density to screen for osteoporosis or thinning of the bones should be done every 2 years after 65. Screening for diabetes mellitus with a blood sugar test should be done every year. Glaucoma is a disease of the eye due to increased ocular pressure that can lead to blindness and it should be done every year by an eye professional.    Cardiovascular screening tests that check for elevated lipids (fatty part of blood) which can lead to heart disease and strokes should be done every 5 years. Colorectal screening that evaluates for blood or polyps in your colon should be done yearly as a stool test or every five years as a flexible sigmoidoscope or every 10 years as a colonoscopy up to age 76. Breast cancer screening with a mammogram is recommended biennially  for women age 54-69. Screening for cervical cancer with a pap smear and pelvic exam is recommended for women after age 72 years every 2 years up to age 79 or when the provider and patient decide to stop. If there is a history of cervical abnormalities or other increased risk for cancer then the test is recommended yearly.     Hepatitis C screening is also recommended for anyone born between [birth year] through [facility]. A shingles vaccine is also recommended once in a lifetime after age 61. Your Medicare Wellness Exam is recommended annually. Here is a list of your current Health Maintenance items with a due date:  Health Maintenance Due   Topic Date Due    Hepatitis C Test  1955    Hemoglobin A1C    1955    Cholesterol Test   1955    Diabetic Foot Care  02/09/1963    Albumin Urine Test  02/09/1963    Eye Exam  02/09/1963    Pneumococcal Vaccine (1 of 1 - PPSV23) 02/09/1972    DTaP/Tdap/Td  (1 - Tdap) 02/09/1974    Cervical Cancer Screening  02/09/1974    Stool testing for trace blood  02/09/2003    Shingles Vaccine  12/09/2012    Flu Vaccine  08/01/2017          Colon Cancer Screening: Care Instructions  Your Care Instructions    Colorectal cancer occurs in the colon or rectum. That's the lower part of your digestive system. It is the second-leading cause of cancer deaths in the [place]. It often starts with small growths called polyps in the colon or rectum. Polyps are usually found with screening tests. Depending on the type of test, any polyps found may be removed during the tests. Colorectal cancer usually does not cause symptoms at first. But regular tests can help find it early, before it spreads and becomes harder to treat. Experts advise routine tests for colon cancer for people starting at age 48. And they advise people with a higher risk of colon cancer to get tested sooner. Talk with your doctor about when you should start testing. Discuss which tests you need. Follow-up care is a key part of your treatment and safety. Be sure to make and go to all appointments, and call your doctor if you are having problems. It's also a good idea to know your test results and keep a list of the medicines you take. What are the main screening tests for colon cancer? · Stool tests.  These include the fecal immunochemical test (FIT) and the fecal occult blood test (FOBT). These tests check stool samples for signs of cancer. If your test is positive, you will need to have a colonoscopy. · Sigmoidoscopy. This test lets your doctor look at the lining of your rectum and the lowest part of your colon. Your doctor uses a lighted tube called a sigmoidoscope. This test can't find cancers or polyps in the upper part of your colon. In some cases, polyps that are found can be removed. But if your doctor finds polyps, you will need to have a colonoscopy to check the upper part of your colon. · Colonoscopy. This test lets your doctor look at the lining of your rectum and your entire colon. The doctor uses a thin, flexible tool called a colonoscope. It can also be used to remove polyps or get a tissue sample (biopsy). What tests do you need? The following guidelines are for people age 48 and over who are not at high risk for colorectal cancer. You may have at least one of these tests as directed by your doctor. · Fecal immunochemical test (FIT) or fecal occult blood test (FOBT) every year  · Sigmoidoscopy every 5 years  · Colonoscopy every 10 years  If you are age 68 to 80, you can work with your doctor to decide if screening is a good option. If you are age 80 or older, your doctor will likely advise that screening is not helpful. Talk with your doctor about when you need to be tested. And discuss which tests are right for you. Your doctor may recommend earlier or more frequent testing if you:  · Have had colorectal cancer before. · Have had colon polyps. · Have symptoms of colorectal cancer. These include blood in your stool and changes in your bowel habits. · Have a parent, brother or sister, or child with colon polyps or colorectal cancer. · Have a bowel disease. This includes ulcerative colitis and Crohn's disease. · Have a rare polyp syndrome that runs in families, such as familial adenomatous polyposis (FAP).   · Have had radiation treatments to the belly or pelvis. When should you call for help? Watch closely for changes in your health, and be sure to contact your doctor if:  ? · You have any changes in your bowel habits. ? · You have any problems. Where can you learn more? Go to http://lei-aleah.info/. Enter M541 in the search box to learn more about \"Colon Cancer Screening: Care Instructions. \"  Current as of: May 12, 2017  Content Version: 11.4  © 9794-9455 [employer]. Care instructions adapted under license by [employer] (which disclaims liability or warranty for this information). If you have questions about a medical condition or this instruction, always ask your healthcare professional. [address] any warranty or liability for your use of this information.

## 2017-10-27 NOTE — MR AVS SNAPSHOT
Visit Information Date & Time Provider Department Dept. Phone Encounter #  
 10/27/2017  8:30 AM Philip Fritz, [address] [id number] Follow-up Instructions Return in about 3 months (around 1/27/2018). Upcoming Health Maintenance Date Due Hepatitis C Screening 1955 HEMOGLOBIN A1C Q6M 1955 LIPID PANEL Q1 1955 FOOT EXAM Q1 2/9/1963 MICROALBUMIN Q1 2/9/1963 EYE EXAM RETINAL OR DILATED Q1 2/9/1963 Pneumococcal 19-64 Medium Risk (1 of 1 - PPSV23) 2/9/1972 DTaP/Tdap/Td series (1 - Tdap) 2/9/1974 PAP AKA CERVICAL CYTOLOGY 2/9/1974 FOBT Q 1 YEAR AGE 50-75 2/9/2003 ZOSTER VACCINE AGE 60> 12/9/2012 INFLUENZA AGE 9 TO ADULT 8/1/2017 BREAST CANCER SCRN MAMMOGRAM 1/17/2019 Allergies as of 10/27/2017  Review Complete On: 10/27/2017 By: Philip Fritz MD  
 No Known Allergies Current Immunizations  Never Reviewed Name Date Influenza Vaccine (Quad) PF  Incomplete Not reviewed this visit You Were Diagnosed With   
  
 Codes Comments Medicare annual wellness visit, subsequent    -  Primary ICD-10-CM: Z00.00 ICD-9-CM: V70.0 Screen for colon cancer     ICD-10-CM: Z12.11 ICD-9-CM: V76.51 Encounter for immunization     ICD-10-CM: W38 ICD-9-CM: V03.89 Diabetic peripheral neuropathy associated with type 2 diabetes mellitus (HCC)     ICD-10-CM: E11.42 
ICD-9-CM: 250.60, 357.2 Vitals BP Pulse Temp Resp Height(growth percentile) Weight(growth percentile) 178/72 (BP 1 Location: Left arm, BP Patient Position: Sitting) (!) 58 97.7 °F (36.5 °C) (Oral) 10 5' 6\" (1.676 m) 214 lb (97.1 kg) SpO2 BMI OB Status Smoking Status 99% 34.54 kg/m2 Menopause Never Smoker Vitals History BMI and BSA Data Body Mass Index Body Surface Area 34.54 kg/m 2 2.13 m 2 Preferred Pharmacy Pharmacy Name Phone [id number] - [address], [address] [phone] Your Updated Medication List  
  
   
This list is accurate as of: 10/27/17  8:43 AM.  Always use your most recent med list.  
  
  
  
  
 AMARYL 4 mg tablet Generic drug:  glimepiride Take  by mouth every morning. amLODIPine 5 mg tablet Commonly known as:  NORVASC  
  
 aspirin delayed-release 325 mg tablet Take  by mouth every six (6) hours as needed for Pain. BD INSULIN SYRINGE ULTRA-FINE 1 mL 30 gauge x 1/2\" Syrg Generic drug:  Insulin Syringe-Needle U-100 BD Single Use Swabs Regular Padm Generic drug:  alcohol swabs  
  
 black cohosh 540 mg Cap Take  by mouth.  
  
 cloNIDine HCl 0.1 mg tablet Commonly known as:  CATAPRES  
  
 DIOVAN -12.5 mg per tablet Generic drug:  valsartan-hydroCHLOROthiazide Take 1 Tab by mouth daily. Takes one half in the morning and one half in the evening FISH OIL 1,000 mg Cap Generic drug:  omega-3 fatty acids-vitamin e Take 1 Cap by mouth. LIPITOR 80 mg tablet Generic drug:  atorvastatin Take 80 mg by mouth daily. TOPROL XL 50 mg XL tablet Generic drug:  metoprolol succinate Take  by mouth daily. TOUJEO SOLOSTAR SC  
by SubCUTAneous route. varicella zoster vacine live 19,400 unit/0.65 mL Susr injection Commonly known as:  ZOSTAVAX  
1 Vial by SubCUTAneous route once for 1 dose. vit A,C,E-min-herbal comp 221 2,000-1,000-30 unit-mg-unit Tbef Take  by mouth. VITAMIN D3 1,000 unit Cap Generic drug:  cholecalciferol Take  by mouth. ZINC ACETATE PO Take  by mouth. Prescriptions Printed Refills  
 varicella zoster vacine live (ZOSTAVAX) 19,400 unit/0.65 mL susr injection 0 Si Vial by SubCUTAneous route once for 1 dose. Class: Print Route: SubCUTAneous We Performed the Following AMB POC URINE, MICROALBUMIN, SEMIQUANT (3 RESULTS) [99615 CPT(R)] INFLUENZA VIRUS VAC QUAD,SPLIT,PRESV FREE SYRINGE IM [id number] CPT(R)] REFERRAL TO COLON AND RECTAL SURGERY [REF17 Custom] Comments:  
 Please evaluate patient for screening colonoscopy. Sister  of colon cancer. Follow-up Instructions Return in about 3 months (around 2018). Referral Information Referral ID Referred By Referred To  
  
 [id number] Amadou Amaya, [address], [address] [facility], [address] Phone: [phone] Fax: [phone] Visits Status Start Date End Date 1 New Request 10/27/17 10/27/18 If your referral has a status of pending review or denied, additional information will be sent to support the outcome of this decision. Patient Instructions Medicare Wellness Visit, Female The best way to live healthy is to have a healthy lifestyle by eating a well-balanced diet, exercising regularly, limiting alcohol and stopping smoking. Regular physical exams and screening tests are another way to keep healthy. Preventive exams provided by your health care provider can find health problems before they become diseases or illnesses. Preventive services including immunizations, screening tests, monitoring and exams can help you take care of your own health. All people over age 72 should have a pneumovax  and and a prevnar shot to prevent pneumonia. These are once in a lifetime unless you and your provider decide differently. All people over 65 should have a yearly flu shot and a tetanus vaccine every 10 years. A bone mass density to screen for osteoporosis or thinning of the bones should be done every 2 years after 65. Screening for diabetes mellitus with a blood sugar test should be done every year.  
 
Glaucoma is a disease of the eye due to increased ocular pressure that can lead to blindness and it should be done every year by an eye professional. 
 
 Cardiovascular screening tests that check for elevated lipids (fatty part of blood) which can lead to heart disease and strokes should be done every 5 years. Colorectal screening that evaluates for blood or polyps in your colon should be done yearly as a stool test or every five years as a flexible sigmoidoscope or every 10 years as a colonoscopy up to age 76. Breast cancer screening with a mammogram is recommended biennially  for women age 54-69. Screening for cervical cancer with a pap smear and pelvic exam is recommended for women after age 72 years every 2 years up to age 79 or when the provider and patient decide to stop. If there is a history of cervical abnormalities or other increased risk for cancer then the test is recommended yearly. Hepatitis C screening is also recommended for anyone born between 80 through [address]. A shingles vaccine is also recommended once in a lifetime after age 61. Your Medicare Wellness Exam is recommended annually. Here is a list of your current Health Maintenance items with a due date: 
Health Maintenance Due Topic Date Due  
 Hepatitis C Test  1955  Hemoglobin A1C    1955  Cholesterol Test   1955 [address] [place] [address] Diabetic Foot Care  02/09/1963  Albumin Urine Test  02/09/1963 [address] [place] [address] Eye Exam  02/09/1963  Pneumococcal Vaccine (1 of 1 - PPSV23) 02/09/1972  
 DTaP/Tdap/Td  (1 - Tdap) 02/09/1974  Cervical Cancer Screening  02/09/1974  Stool testing for trace blood  02/09/2003  Shingles Vaccine  12/09/2012  Flu Vaccine  08/01/2017 Colon Cancer Screening: Care Instructions Your Care Instructions Colorectal cancer occurs in the colon or rectum. That's the lower part of your digestive system. It is the second-leading cause of cancer deaths in the [place]. It often starts with small growths called polyps in the colon or rectum. Polyps are usually found with screening tests.  Depending on the type of test, any polyps found may be removed during the tests. Colorectal cancer usually does not cause symptoms at first. But regular tests can help find it early, before it spreads and becomes harder to treat. Experts advise routine tests for colon cancer for people starting at age 48. And they advise people with a higher risk of colon cancer to get tested sooner. Talk with your doctor about when you should start testing. Discuss which tests you need. Follow-up care is a key part of your treatment and safety. Be sure to make and go to all appointments, and call your doctor if you are having problems. It's also a good idea to know your test results and keep a list of the medicines you take. What are the main screening tests for colon cancer? · Stool tests. These include the fecal immunochemical test (FIT) and the fecal occult blood test (FOBT). These tests check stool samples for signs of cancer. If your test is positive, you will need to have a colonoscopy. · Sigmoidoscopy. This test lets your doctor look at the lining of your rectum and the lowest part of your colon. Your doctor uses a lighted tube called a sigmoidoscope. This test can't find cancers or polyps in the upper part of your colon. In some cases, polyps that are found can be removed. But if your doctor finds polyps, you will need to have a colonoscopy to check the upper part of your colon. · Colonoscopy. This test lets your doctor look at the lining of your rectum and your entire colon. The doctor uses a thin, flexible tool called a colonoscope. It can also be used to remove polyps or get a tissue sample (biopsy). What tests do you need? The following guidelines are for people age 48 and over who are not at high risk for colorectal cancer. You may have at least one of these tests as directed by your doctor. · Fecal immunochemical test (FIT) or fecal occult blood test (FOBT) every year · Sigmoidoscopy every 5 years · Colonoscopy every 10 years If you are age 68 to 80, you can work with your doctor to decide if screening is a good option. If you are age 80 or older, your doctor will likely advise that screening is not helpful. Talk with your doctor about when you need to be tested. And discuss which tests are right for you. Your doctor may recommend earlier or more frequent testing if you: 
· Have had colorectal cancer before. · Have had colon polyps. · Have symptoms of colorectal cancer. These include blood in your stool and changes in your bowel habits. · Have a parent, brother or sister, or child with colon polyps or colorectal cancer. · Have a bowel disease. This includes ulcerative colitis and Crohn's disease. · Have a rare polyp syndrome that runs in families, such as familial adenomatous polyposis (FAP). · Have had radiation treatments to the belly or pelvis. When should you call for help? Watch closely for changes in your health, and be sure to contact your doctor if: 
? · You have any changes in your bowel habits. ? · You have any problems. Where can you learn more? Go to http://lei-aleah.info/. Enter M541 in the search box to learn more about \"Colon Cancer Screening: Care Instructions. \" Current as of: May 12, 2017 Content Version: 11.4 © 3645-0068 Healthwise, [employer]. Care instructions adapted under license by [employer] (which disclaims liability or warranty for this information). If you have questions about a medical condition or this instruction, always ask your healthcare professional. [place] [address] any warranty or liability for your use of this information. Introducing [facility]! Rosemary Vcikers introduces [employer] patient portal. Now you can access parts of your medical record, email your doctor's office, and request medication refills online. 1. In your internet browser, go to [email or web address] 2. Click on the First Time User? Click Here link in the Sign In box. You will see the New Member Sign Up page. 3. Enter your BigRep Access Code exactly as it appears below. You will not need to use this code after youve completed the sign-up process. If you do not sign up before the expiration date, you must request a new code. · BigRep Access Code: [email or web address] Expires: 1/25/2018  8:38 AM 
 
4. Enter the last four digits of your Social Security Number (xxxx) and Date of Birth (mm/dd/yyyy) as indicated and click Submit. You will be taken to the next sign-up page. 5. Create a BigRep ID. This will be your BigRep login ID and cannot be changed, so think of one that is secure and easy to remember. 6. Create a BigRep password. You can change your password at any time. 7. Enter your Password Reset Question and Answer. This can be used at a later time if you forget your password. 8. Enter your e-mail address. You will receive e-mail notification when new information is available in [address] 9. Click Sign Up. You can now view and download portions of your medical record. 10. Click the Download Summary menu link to download a portable copy of your medical information. If you have questions, please visit the Frequently Asked Questions section of the BigRep website. Remember, BigRep is NOT to be used for urgent needs. For medical emergencies, dial 911. Now available from your iPhone and Android! Please provide this summary of care documentation to your next provider. Your primary care clinician is listed as [address]. If you have any questions after today's visit, please call [phone].

## 2017-11-14 ENCOUNTER — PATIENT OUTREACH (OUTPATIENT)
Dept: FAMILY MEDICINE CLINIC | Age: 62
End: 2017-11-14

## 2017-11-14 NOTE — PROGRESS NOTES
health screening:    Mrs. John Acevedo states she is planning to contact a gynecologist near her home and have her cervical cancer screening done soon. She has been referred to Dr. Maryjean Boeck for colonoscopy (had sister who  of colon cancer) and is awaiting their call. She completed her f/u mammogram @ [address] [place] and I have asked the office staff to request the report. Will continue to follow until all 3 screenings are done and hyperlinked in .

## 2017-12-04 ENCOUNTER — PATIENT OUTREACH (OUTPATIENT)
Dept: FAMILY MEDICINE CLINIC | Age: 62
End: 2017-12-04

## 2017-12-04 NOTE — PROGRESS NOTES
NN health screening:    Noted referral for Dr. Burak Mendiola has been authorized. I have contacted Dr. Blas Garcia office staff to ensure pt gets scheduled. Noted mammogram results updated in HM with report. Will continue to follow until completion of colonoscopy and cervical cancer screening.

## 2018-01-02 ENCOUNTER — PATIENT OUTREACH (OUTPATIENT)
Dept: FAMILY MEDICINE CLINIC | Age: 63
End: 2018-01-02

## 2018-01-03 NOTE — PROGRESS NOTES
health screening:    Mammogram completed and updated in . Scheduled with Dr. Ayde Mead for colonoscopy on January 5th.

## 2018-01-15 ENCOUNTER — PATIENT OUTREACH (OUTPATIENT)
Dept: FAMILY MEDICINE CLINIC | Age: 63
End: 2018-01-15

## 2018-01-15 RX ORDER — HYDRALAZINE HYDROCHLORIDE 25 MG/1
25 TABLET, FILM COATED ORAL 3 TIMES DAILY
COMMUNITY
Start: 2018-01-12 | End: 2018-02-12 | Stop reason: SDUPTHER

## 2018-01-15 RX ORDER — GLIMEPIRIDE 4 MG/1
4 TABLET ORAL DAILY
COMMUNITY
End: 2018-01-19 | Stop reason: ALTCHOICE

## 2018-01-15 RX ORDER — BISMUTH SUBSALICYLATE 262 MG
1 TABLET,CHEWABLE ORAL DAILY
COMMUNITY

## 2018-01-15 RX ORDER — METOLAZONE 2.5 MG/1
2.5 TABLET ORAL DAILY
COMMUNITY
Start: 2018-01-13 | End: 2018-02-12 | Stop reason: SDUPTHER

## 2018-01-15 RX ORDER — ZOLPIDEM TARTRATE 5 MG/1
5 TABLET ORAL
COMMUNITY
Start: 2018-01-12 | End: 2018-01-19 | Stop reason: ALTCHOICE

## 2018-01-15 RX ORDER — METOPROLOL SUCCINATE 50 MG/1
50 TABLET, EXTENDED RELEASE ORAL DAILY
COMMUNITY
Start: 2018-01-12 | End: 2018-01-15 | Stop reason: SDUPTHER

## 2018-01-15 RX ORDER — CLONIDINE HYDROCHLORIDE 0.3 MG/1
0.3 TABLET ORAL 2 TIMES DAILY
COMMUNITY
Start: 2018-01-12 | End: 2018-02-16 | Stop reason: SDUPTHER

## 2018-01-15 RX ORDER — FUROSEMIDE 20 MG/1
20 TABLET ORAL 2 TIMES DAILY
COMMUNITY
Start: 2018-01-12 | End: 2018-01-27

## 2018-01-15 RX ORDER — ASPIRIN 325 MG
500 TABLET, DELAYED RELEASE (ENTERIC COATED) ORAL 2 TIMES DAILY
COMMUNITY

## 2018-01-15 RX ORDER — HYDROCHLOROTHIAZIDE 25 MG/1
25 TABLET ORAL DAILY
COMMUNITY
Start: 2018-01-13 | End: 2018-01-19 | Stop reason: ALTCHOICE

## 2018-01-15 RX ORDER — VALSARTAN 80 MG/1
80 TABLET ORAL DAILY
COMMUNITY
Start: 2018-01-13 | End: 2018-02-12 | Stop reason: SDUPTHER

## 2018-01-15 RX ORDER — AMLODIPINE BESYLATE 10 MG/1
10 TABLET ORAL DAILY
COMMUNITY
Start: 2018-01-13 | End: 2018-02-12 | Stop reason: SDUPTHER

## 2018-01-15 RX ORDER — CLOPIDOGREL BISULFATE 75 MG/1
75 TABLET ORAL DAILY
COMMUNITY
Start: 2018-01-13 | End: 2018-02-12 | Stop reason: SDUPTHER

## 2018-01-15 NOTE — PROGRESS NOTES
Transitions of Care Coordination    Jey Maradiaga was hospitalized at [facility]17 for a new diagnosis of diastolic CHF, CKD IV, renal artery stenosis and discharged to home with [facility]. RRAT not available. Heart Failure Follow Up Call    Josh Doll contacted the patient by telephone to perform CHF Follow Up. Verified  and address as identifiers. Ms Ronny Calle stated \"I'm feeling good. \"  Patient stated she was in the car on the way to [facility] [address] [place] to attend the [address]  and son were accompanying patient to the clinic. Noted Priorities/Plan:  Contact patient later in the week to continue assessment. Daily Weight (document daily weights in flowsheets):   not available. Patient stated she intends to purchase a new scale today. [facility] care mentioned bringing out telehealth equipment. Goals        Heart Failure     Knowledge and adherence medication (ie. action, side effects, missed dose, etc.)      Maintains daily weight.  Understands and adheres to diet. Needs addressed from pathway:   24-48 Hours     Most Recent BMP and CBC:        Recent Labs      18   0510   GLUCOSE  217*   BUN  115*   CALCIUM  9.1   CREAT  4.1*   NA  137   POTASSIUM  5.0   CHLORIDE  101   CO2  19*          Recent Labs      18   0510   WBC  5.9   RBC  3.34*   HEMOGLOBIN  9.4*   HCT  28.1*   MCV  84   MCH  28   MCHC  34   PLATELET  033*   RDW  14.1                      PCP: Dr. Mark Guerrero  Cardiologist: unknown  Specialist: Dr. Lakshmi Villalta, nephrology  Follow up appointment with Cardiology (1-3 days): TBD  Follow up appointment with PCP (within 14 days):  Offered but patient prefers to schedule  Cardiologist consult while IP: No     Palliative consult while IP:    No     EF: 65% on 18  Type of HF:   HFpEF     Cardiac Device present: none      Heart Failure Medications: ACE/ARB, Betablocker, Diuretic     Disposition of patient:  Home with [facility] home care. HH Services/Tele Monitoring:  Pt stated [facility] home care will provide     Social support: , children    Do you have a Scale:    No.  Will obtain. May have telehealth equip. How often do you weigh: To be discussed. Does patient have an Advance Directive:  not on file     Advance Directive scanned into patients chart:  No     Patient reminded that there are physicians on call 24 hours a day / 7 days a week (M-F 5pm to 8am and from Friday 5pm until Monday 8a for the weekend) should the patient have questions or concerns. Patient reminded to call 911 if situation is emergent or patient feels the situation is emergent. Pt verbalizes understanding.

## 2018-01-16 ENCOUNTER — PATIENT OUTREACH (OUTPATIENT)
Dept: FAMILY MEDICINE CLINIC | Age: 63
End: 2018-01-16

## 2018-01-16 NOTE — PROGRESS NOTES
Transitions of Care Coordination    Follow up for hospitalization at [facility] 1/5-1/12/17 for a new diagnosis of diastolic CHF, CKD IV, renal artery stenosis and discharge to home with [facility] home care. Eric Jackson contacted the patient by telephone to perform CHF follow Up. Ms Kalyani Jernigan stated \"I'm doing fine. I went to the HF Clinic at [facility] yesterday. I met with the pharmacist who went over every one of my medications in detail and they're all set up in a pill minder box. I got lots of education and they gave me a scale too. \"  Declined medication reconciliation with this writer. Patient able to state HF zone for today and to outline the zone and what to do if she is in a yellow or red zone. Patient understands to weigh daily and record weight and when to notify cardiology or PCP of weight gain. Noted Priorities/Plan:  Obtain information re appointment with cardiologist.    Daily Weight: 204.6 stable  Zone: green   Signs/Symptoms: Swelling-no SOB-no    Needs addressed from pathway: Week 1    Week 1-4   Provide Daily Disease Management  (NN initiated)  ? Daily weight (Before Breakfast-  Daily Zone Identification (symptom management; weight, edema, SOB, activity/sleep changes)-notify provider immediately as indicated  ? Cardiac Low-sodium Diet (No added sodium; 1500mg as indicated). If  Diabetic: include carbohydrate controlled   ? Fluid restriction (if indicated)  ? Confirm follow-up appointments/transportation. Reschedule if needed. ? Diet/appetite assessment       Monitoring:  ? Home healtth         Have you been to an ER/Hospital since discharge from [facility]? No      Have you followed up with PCP/Cardiologist/Specialist? No.    Appointment with PCP, Dr. Azra Beasley, scheduled today for 1/17/18 at 46. Appointment with vascular, Dr. Epifanio Larsen 2/9/18. Patient stated Dr. Azra Beasley, renal, advised to keep apt with him in about 1.5 months. Transportation:  regular car- drives.   Diet: diabetic/ 2 Gm NA, 1500 cc fluid restriction  Activity/ADLs: independent   Medications Reconciled at this time:  No, patient declined  Home health:  Company/Completion: [facility]: , children    Patient reminded that there is a physician on call 24 hours a day / 7 days a week (M-F 5pm to 8am and from Friday 5pm until Monday 8a for the weekend) should the patient have questions or concerns. Patient reminded to call 911 if situation is emergent or patient feels the situation is emergent.   Pt verbalizes understanding.

## 2018-01-19 ENCOUNTER — OFFICE VISIT (OUTPATIENT)
Dept: FAMILY MEDICINE CLINIC | Age: 63
End: 2018-01-19

## 2018-01-19 VITALS
DIASTOLIC BLOOD PRESSURE: 58 MMHG | SYSTOLIC BLOOD PRESSURE: 150 MMHG | RESPIRATION RATE: 12 BRPM | HEART RATE: 59 BPM | OXYGEN SATURATION: 98 % | TEMPERATURE: 98.5 F | WEIGHT: 293 LBS | BODY MASS INDEX: 47.09 KG/M2 | HEIGHT: 66 IN

## 2018-01-19 DIAGNOSIS — I50.9 ACUTE CONGESTIVE HEART FAILURE, UNSPECIFIED CONGESTIVE HEART FAILURE TYPE: Primary | ICD-10-CM

## 2018-01-19 DIAGNOSIS — E66.01 OBESITY, MORBID (HCC): ICD-10-CM

## 2018-01-19 RX ORDER — ATORVASTATIN CALCIUM 80 MG/1
40 TABLET, FILM COATED ORAL DAILY
COMMUNITY
End: 2020-06-16 | Stop reason: SDUPTHER

## 2018-01-19 NOTE — PROGRESS NOTES
Patient here for f/u on hospital stay at [place] [address] from 1/5/18-1/12/18. She was contacted by a nurse navigator on 1/15/18. 1. Have you been to the ER, urgent care clinic since your last visit? Hospitalized since your last visit? Yes When: 1/5/18-1/12/18 Where: [place] [address] Reason for visit: SOB    2. Have you seen or consulted any other health care providers outside of the [address] [place] [address] since your last visit? Include any pap smears or colon screening. No  Health Maintenance reviewed - Will discuss at her next visit.

## 2018-01-19 NOTE — PATIENT INSTRUCTIONS
Heart Failure: Care Instructions  Your Care Instructions    Heart failure occurs when your heart does not pump as much blood as the body needs. Failure does not mean that the heart has stopped pumping but rather that it is not pumping as well as it should. Over time, this causes fluid buildup in your lungs and other parts of your body. Fluid buildup can cause shortness of breath, fatigue, swollen ankles, and other problems. By taking medicines regularly, reducing sodium (salt) in your diet, checking your weight every day, and making lifestyle changes, you can feel better and live longer. Follow-up care is a key part of your treatment and safety. Be sure to make and go to all appointments, and call your doctor if you are having problems. It's also a good idea to know your test results and keep a list of the medicines you take. How can you care for yourself at home? Medicines  ? · Be safe with medicines. Take your medicines exactly as prescribed. Call your doctor if you think you are having a problem with your medicine. ? · Do not take any vitamins, over-the-counter medicine, or herbal products without talking to your doctor first. Sarahi Valente not take ibuprofen (Advil or Motrin) and naproxen (Aleve) without talking to your doctor first. They could make your heart failure worse. ? · You may be taking some of the following medicine. ¨ Beta-blockers can slow heart rate, decrease blood pressure, and improve your condition. Taking a beta-blocker may lower your chance of needing to be hospitalized. ¨ Angiotensin-converting enzyme inhibitors (ACEIs) reduce the heart's workload, lower blood pressure, and reduce swelling. Taking an ACEI may lower your chance of needing to be hospitalized again. ¨ Angiotensin II receptor blockers (ARBs) work like ACEIs. Your doctor may prescribe them instead of ACEIs. ¨ Diuretics, also called water pills, reduce swelling.   ¨ Potassium supplements replace this important mineral, which is sometimes lost with diuretics. ¨ Aspirin and other blood thinners prevent blood clots, which can cause a stroke or heart attack. ? You will get more details on the specific medicines your doctor prescribes. Diet  ? · Your doctor may suggest that you limit sodium to 2,000 milligrams (mg) a day or less. That is less than 1 teaspoon of salt a day, including all the salt you eat in cooking or in packaged foods. People get most of their sodium from processed foods. Fast food and restaurant meals also tend to be very high in sodium. ? · Ask your doctor how much liquid you can drink each day. You may have to limit liquids. ?Weight  ? · Weigh yourself without clothing at the same time each day. Record your weight. Call your doctor if you have a sudden weight gain, such as more than 2 to 3 pounds in a day or 5 pounds in a week. (Your doctor may suggest a different range of weight gain.) A sudden weight gain may mean that your heart failure is getting worse. ? Activity level  ? · Start light exercise (if your doctor says it is okay). Even if you can only do a small amount, exercise will help you get stronger, have more energy, and manage your weight and your stress. Walking is an easy way to get exercise. Start out by walking a little more than you did before. Bit by bit, increase the amount you walk. ? · When you exercise, watch for signs that your heart is working too hard. You are pushing yourself too hard if you cannot talk while you are exercising. If you become short of breath or dizzy or have chest pain, stop, sit down, and rest.   ? · If you feel \"wiped out\" the day after you exercise, walk slower or for a shorter distance until you can work up to a better pace. ? · Get enough rest at night. Sleeping with 1 or 2 pillows under your upper body and head may help you breathe easier. ? Lifestyle changes  ? · Do not smoke. Smoking can make a heart condition worse.  If you need help quitting, talk to your doctor about stop-smoking programs and medicines. These can increase your chances of quitting for good. Quitting smoking may be the most important step you can take to protect your heart. ? · Limit alcohol to 2 drinks a day for men and 1 drink a day for women. Too much alcohol can cause health problems. ? · Avoid getting sick from colds and the flu. Get a pneumococcal vaccine shot. If you have had one before, ask your doctor whether you need another dose. Get a flu shot each year. If you must be around people with colds or the flu, wash your hands often. When should you call for help? Call 911 if you have symptoms of sudden heart failure such as:  ? · You have severe trouble breathing. ? · You cough up pink, foamy mucus. ? · You have a new irregular or rapid heartbeat. ?Call your doctor now or seek immediate medical care if:  ? · You have new or increased shortness of breath. ? · You are dizzy or lightheaded, or you feel like you may faint. ? · You have sudden weight gain, such as more than 2 to 3 pounds in a day or 5 pounds in a week. (Your doctor may suggest a different range of weight gain.)   ? · You have increased swelling in your legs, ankles, or feet. ? · You are suddenly so tired or weak that you cannot do your usual activities. ? Watch closely for changes in your health, and be sure to contact your doctor if you develop new symptoms. Where can you learn more? Go to http://lei-aleah.info/. Enter W086 in the search box to learn more about \"Heart Failure: Care Instructions. \"  Current as of: September 21, 2016  Content Version: 11.4  © 7912-4709 [employer]. Care instructions adapted under license by [employer] (which disclaims liability or warranty for this information).  If you have questions about a medical condition or this instruction, always ask your healthcare professional. [address] any warranty or liability for your use of this information. Body Mass Index: Care Instructions  Your Care Instructions    Body mass index (BMI) can help you see if your weight is raising your risk for health problems. It uses a formula to compare how much you weigh with how tall you are. · A BMI lower than 18.5 is considered underweight. · A BMI between 18.5 and 24.9 is considered healthy. · A BMI between 25 and 29.9 is considered overweight. A BMI of 30 or higher is considered obese. If your BMI is in the normal range, it means that you have a lower risk for weight-related health problems. If your BMI is in the overweight or obese range, you may be at increased risk for weight-related health problems, such as high blood pressure, heart disease, stroke, arthritis or joint pain, and diabetes. If your BMI is in the underweight range, you may be at increased risk for health problems such as fatigue, lower protection (immunity) against illness, muscle loss, bone loss, hair loss, and hormone problems. BMI is just one measure of your risk for weight-related health problems. You may be at higher risk for health problems if you are not active, you eat an unhealthy diet, or you drink too much alcohol or use tobacco products. Follow-up care is a key part of your treatment and safety. Be sure to make and go to all appointments, and call your doctor if you are having problems. It's also a good idea to know your test results and keep a list of the medicines you take. How can you care for yourself at home? · Practice healthy eating habits. This includes eating plenty of fruits, vegetables, whole grains, lean protein, and low-fat dairy. · If your doctor recommends it, get more exercise. Walking is a good choice. Bit by bit, increase the amount you walk every day. Try for at least 30 minutes on most days of the week. · Do not smoke. Smoking can increase your risk for health problems.  If you need help quitting, talk to your doctor about stop-smoking programs and medicines. These can increase your chances of quitting for good. · Limit alcohol to 2 drinks a day for men and 1 drink a day for women. Too much alcohol can cause health problems. If you have a BMI higher than 25  · Your doctor may do other tests to check your risk for weight-related health problems. This may include measuring the distance around your waist. A waist measurement of more than 40 inches in men or 35 inches in women can increase the risk of weight-related health problems. · Talk with your doctor about steps you can take to stay healthy or improve your health. You may need to make lifestyle changes to lose weight and stay healthy, such as changing your diet and getting regular exercise. If you have a BMI lower than 18.5  · Your doctor may do other tests to check your risk for health problems. · Talk with your doctor about steps you can take to stay healthy or improve your health. You may need to make lifestyle changes to gain or maintain weight and stay healthy, such as getting more healthy foods in your diet and doing exercises to build muscle. Where can you learn more? Go to http://lei-aleah.info/. Enter S176 in the search box to learn more about \"Body Mass Index: Care Instructions. \"  Current as of: October 13, 2016  Content Version: 11.4  © 7007-4004 [employer]. Care instructions adapted under license by [employer] (which disclaims liability or warranty for this information). If you have questions about a medical condition or this instruction, always ask your healthcare professional. [place] [address] any warranty or liability for your use of this information.

## 2018-01-19 NOTE — MR AVS SNAPSHOT
[facility] [address] [place] [address] Road 
[phone] Patient: Soledad Sender MRN: [id number] Visit Information Date & Time Provider Department Dept. Phone Encounter #  
 1/19/2018  1:00 PM Chcuk Castaneda MD [facility] [phone] [id number] Follow-up Instructions Return in about 3 weeks (around 2/9/2018). Upcoming Health Maintenance Date Due Hepatitis C Screening 1955 HEMOGLOBIN A1C Q6M 1955 LIPID PANEL Q1 1955 FOOT EXAM Q1 2/9/1963 EYE EXAM RETINAL OR DILATED Q1 2/9/1963 PAP AKA CERVICAL CYTOLOGY 2/9/1974 FOBT Q 1 YEAR AGE 50-75 2/9/2003 ZOSTER VACCINE AGE 60> 12/9/2012 MICROALBUMIN Q1 10/27/2018 BREAST CANCER SCRN MAMMOGRAM 10/31/2019 DTaP/Tdap/Td series (2 - Td) 1/19/2028 Allergies as of 1/19/2018  Review Complete On: 1/19/2018 By: Chuck Castaneda MD  
 No Known Allergies Current Immunizations  Never Reviewed Name Date Influenza Vaccine (Quad) PF 10/27/2017 Not reviewed this visit You Were Diagnosed With   
  
 Codes Comments Acute congestive heart failure, unspecified [facility]    -  Primary ICD-10-CM: I50.9 ICD-9-CM: 428.0 Vitals BP Pulse Temp Resp Height(growth percentile) Weight(growth percentile) 150/58 (BP 1 Location: Left arm, BP Patient Position: Sitting) (!) 59 98.5 °F (36.9 °C) (Oral) 12 5' 6\" (1.676 m) 298 lb (135.2 kg) SpO2 BMI OB Status Smoking Status 98% 48.1 kg/m2 Menopause Never Smoker Vitals History BMI and BSA Data Body Mass Index Body Surface Area  
 48.1 kg/m 2 2.51 m 2 Preferred Pharmacy Pharmacy Name Phone [id number] - [address] [phone] Your Updated Medication List  
  
   
This list is accurate as of: 1/19/18  1:36 PM.  Always use your most recent med list.  
  
  
  
  
 AMARYL 4 mg tablet Generic drug:  glimepiride Take  by mouth every morning. amLODIPine 10 mg tablet Commonly known as:  Abril Aditya Take 10 mg by mouth daily. aspirin delayed-release 325 mg tablet Take  by mouth every six (6) hours as needed for Pain. atorvastatin 80 mg tablet Commonly known as:  LIPITOR 80 mg.  
  
 BD INSULIN SYRINGE ULTRA-FINE 1 mL 30 gauge x 1/2\" Syrg Generic drug:  Insulin Syringe-Needle U-100 BD Single Use Swabs Regular Padm Generic drug:  alcohol swabs  
  
 black cohosh 540 mg Cap Take  by mouth.  
  
 cloNIDine HCl 0.3 mg tablet Commonly known as:  CATAPRES Take 0.3 mg by mouth two (2) times a day. clopidogrel 75 mg Tab Commonly known as:  PLAVIX Take 75 mg by mouth daily. FISH OIL 60- mg Cap Generic drug:  omega 3-dha-epa-fish oil Take 500 mg by mouth two (2) times a day. furosemide 20 mg tablet Commonly known as:  LASIX Take 20 mg by mouth two (2) times a day. hydrALAZINE 25 mg tablet Commonly known as:  APRESOLINE Take 25 mg by mouth three (3) times daily. metOLazone 2.5 mg tablet Commonly known as:  Aurora Sang Take 2.5 mg by mouth daily. TAB-A-VINCENT tablet Generic drug:  multivitamin Take 1 Tab by mouth daily. TOPROL XL 50 mg XL tablet Generic drug:  metoprolol succinate Take  by mouth daily. * TOUJEO SOLOSTAR SC  
by SubCUTAneous route. * TOUJEO SOLOSTAR 300 unit/mL (1.5 mL) Inpn Generic drug:  insulin glargine  
  
 valsartan 80 mg tablet Commonly known as:  DIOVAN Take 80 mg by mouth daily. VITAMIN A PO Take 1 Tab by mouth daily. VITAMIN D3 1,000 unit Cap Generic drug:  cholecalciferol Take  by mouth. ZINC ACETATE PO Take  by mouth. * Notice: This list has 2 medication(s) that are the same as other medications prescribed for you.  Read the directions carefully, and ask your doctor or other care provider to review them with you. Follow-up Instructions Return in about 3 weeks (around 2/9/2018). Patient Instructions Heart Failure: Care Instructions Your Care Instructions Heart failure occurs when your heart does not pump as much blood as the body needs. Failure does not mean that the heart has stopped pumping but rather that it is not pumping as well as it should. Over time, this causes fluid buildup in your lungs and other parts of your body. Fluid buildup can cause shortness of breath, fatigue, swollen ankles, and other problems. By taking medicines regularly, reducing sodium (salt) in your diet, checking your weight every day, and making lifestyle changes, you can feel better and live longer. Follow-up care is a key part of your treatment and safety. Be sure to make and go to all appointments, and call your doctor if you are having problems. It's also a good idea to know your test results and keep a list of the medicines you take. How can you care for yourself at home? Medicines ? · Be safe with medicines. Take your medicines exactly as prescribed. Call your doctor if you think you are having a problem with your medicine. ? · Do not take any vitamins, over-the-counter medicine, or herbal products without talking to your doctor first. Mark English not take ibuprofen (Advil or Motrin) and naproxen (Aleve) without talking to your doctor first. They could make your heart failure worse. ? · You may be taking some of the following medicine. ¨ Beta-blockers can slow heart rate, decrease blood pressure, and improve your condition. Taking a beta-blocker may lower your chance of needing to be hospitalized. ¨ Angiotensin-converting enzyme inhibitors (ACEIs) reduce the heart's workload, lower blood pressure, and reduce swelling. Taking an ACEI may lower your chance of needing to be hospitalized again. ¨ Angiotensin II receptor blockers (ARBs) work like ACEIs. Your doctor may prescribe them instead of ACEIs. ¨ Diuretics, also called water pills, reduce swelling. ¨ Potassium supplements replace this important mineral, which is sometimes lost with diuretics. ¨ Aspirin and other blood thinners prevent blood clots, which can cause a stroke or heart attack. ? You will get more details on the specific medicines your doctor prescribes. Diet ? · Your doctor may suggest that you limit sodium to 2,000 milligrams (mg) a day or less. That is less than 1 teaspoon of salt a day, including all the salt you eat in cooking or in packaged foods. People get most of their sodium from processed foods. Fast food and restaurant meals also tend to be very high in sodium. ? · Ask your doctor how much liquid you can drink each day. You may have to limit liquids. ?Weight ? · Weigh yourself without clothing at the same time each day. Record your weight. Call your doctor if you have a sudden weight gain, such as more than 2 to 3 pounds in a day or 5 pounds in a week. (Your doctor may suggest a different range of weight gain.) A sudden weight gain may mean that your heart failure is getting worse. ? Activity level ? · Start light exercise (if your doctor says it is okay). Even if you can only do a small amount, exercise will help you get stronger, have more energy, and manage your weight and your stress. Walking is an easy way to get exercise. Start out by walking a little more than you did before. Bit by bit, increase the amount you walk. ? · When you exercise, watch for signs that your heart is working too hard. You are pushing yourself too hard if you cannot talk while you are exercising. If you become short of breath or dizzy or have chest pain, stop, sit down, and rest.  
? · If you feel \"wiped out\" the day after you exercise, walk slower or for a shorter distance until you can work up to a better pace. ? · Get enough rest at night. Sleeping with 1 or 2 pillows under your upper body and head may help you breathe easier. ? Lifestyle changes ? · Do not smoke. Smoking can make a heart condition worse. If you need help quitting, talk to your doctor about stop-smoking programs and medicines. These can increase your chances of quitting for good. Quitting smoking may be the most important step you can take to protect your heart. ? · Limit alcohol to 2 drinks a day for men and 1 drink a day for women. Too much alcohol can cause health problems. ? · Avoid getting sick from colds and the flu. Get a pneumococcal vaccine shot. If you have had one before, ask your doctor whether you need another dose. Get a flu shot each year. If you must be around people with colds or the flu, wash your hands often. When should you call for help? Call 911 if you have symptoms of sudden heart failure such as: 
? · You have severe trouble breathing. ? · You cough up pink, foamy mucus. ? · You have a new irregular or rapid heartbeat. ?Call your doctor now or seek immediate medical care if: 
? · You have new or increased shortness of breath. ? · You are dizzy or lightheaded, or you feel like you may faint. ? · You have sudden weight gain, such as more than 2 to 3 pounds in a day or 5 pounds in a week. (Your doctor may suggest a different range of weight gain.) ? · You have increased swelling in your legs, ankles, or feet. ? · You are suddenly so tired or weak that you cannot do your usual activities. ? Watch closely for changes in your health, and be sure to contact your doctor if you develop new symptoms. Where can you learn more? Go to http://lei-aleah.info/. Enter F820 in the search box to learn more about \"Heart Failure: Care Instructions. \" Current as of: September 21, 2016 Content Version: 11.4 © 8225-9057 [employer]wise[employer].  Care instructions adapted under license by [address] (which disclaims liability or warranty for this information). If you have questions about a medical condition or this instruction, always ask your healthcare professional. [address] any warranty or liability for your use of this information. Introducing [facility]! [facility] introduces [employer] patient portal. Now you can access parts of your medical record, email your doctor's office, and request medication refills online. 1. In your internet browser, go to [email or web address] 2. Click on the First Time User? Click Here link in the Sign In box. You will see the New Member Sign Up page. 3. Enter your [employer] Access Code exactly as it appears below. You will not need to use this code after youve completed the sign-up process. If you do not sign up before the expiration date, you must request a new code. · [employer] Access Code: [email or web address] Expires: 1/25/2018  7:38 AM 
 
4. Enter the last four digits of your Social Security Number (xxxx) and Date of Birth (mm/dd/yyyy) as indicated and click Submit. You will be taken to the next sign-up page. 5. Create a [employer] ID. This will be your [employer] login ID and cannot be changed, so think of one that is secure and easy to remember. 6. Create a [employer] password. You can change your password at any time. 7. Enter your Password Reset Question and Answer. This can be used at a later time if you forget your password. 8. Enter your e-mail address. You will receive e-mail notification when new information is available in [address] 9. Click Sign Up. You can now view and download portions of your medical record. 10. Click the Download Summary menu link to download a portable copy of your medical information. If you have questions, please visit the Frequently Asked Questions section of the [employer] website.  Remember, [employer] is NOT to be used for urgent needs. For medical emergencies, dial 911. Now available from your iPhone and Android! Please provide this summary of care documentation to your next provider. Your primary care clinician is listed as [id number] [facility]. If you have any questions after today's visit, please call [phone].

## 2018-01-19 NOTE — PROGRESS NOTES
Nikolas Rivera is a 57 y.o. female  presents for follow up. She was discharged on 1/12 and nurse navigator called her on 1/13/18. She is feeling better. No SOB. Has occ wheeze. No fever or chills. No Known Allergies  Outpatient Prescriptions Marked as Taking for the 1/19/18 encounter (Office Visit) with Leanne Mcginnis MD   Medication Sig Dispense Refill    atorvastatin (LIPITOR) 80 mg tablet 80 mg.      insulin glargine (TOUJEO SOLOSTAR) 300 unit/mL (1.5 mL) inpn       clopidogrel (PLAVIX) 75 mg tab Take 75 mg by mouth daily.  furosemide (LASIX) 20 mg tablet Take 20 mg by mouth two (2) times a day.  omega 3-dha-epa-fish oil (FISH OIL) 60- mg cap Take 500 mg by mouth two (2) times a day.  hydrALAZINE (APRESOLINE) 25 mg tablet Take 25 mg by mouth three (3) times daily.  metOLazone (ZAROXOLYN) 2.5 mg tablet Take 2.5 mg by mouth daily.  multivitamin (TAB-A-VINCENT) tablet Take 1 Tab by mouth daily.  valsartan (DIOVAN) 80 mg tablet Take 80 mg by mouth daily.  VITAMIN A PO Take 1 Tab by mouth daily.  amLODIPine (NORVASC) 10 mg tablet Take 10 mg by mouth daily.  cloNIDine HCl (CATAPRES) 0.3 mg tablet Take 0.3 mg by mouth two (2) times a day.  black cohosh 540 mg cap Take  by mouth.  INSULIN GLARGINE,HUM. REC. ANLOG (TOUJEO SOLOSTAR SC) by SubCUTAneous route.  metoprolol succinate (TOPROL XL) 50 mg XL tablet Take  by mouth daily.  glimepiride (AMARYL) 4 mg tablet Take  by mouth every morning.  Cholecalciferol, Vitamin D3, (VITAMIN D3) 1,000 unit cap Take  by mouth.  ZINC ACETATE PO Take  by mouth.       BD SINGLE USE SWABS REGULAR padm       BD INSULIN SYRINGE ULTRA-FINE 1 mL 30 x 1/2\" syrg        Patient Active Problem List   Diagnosis Code    Hypertension I10    Type 2 diabetes mellitus ([facility] E11.9    Hyperlipidemia E78.5    Diabetic retinopathy associated with type 2 diabetes mellitus ([facility] E11.319    Diabetic peripheral neuropathy associated with type 2 diabetes mellitus (HCC) E11.42    Charcot's joint arthropathy in type 2 diabetes mellitus ([facility] E11.610    Obesity E66.9    Mass of left breast on mammogram N63.20    ACP (advance care planning) Z71.89     Past Medical History:   Diagnosis Date    Charcot's joint disease due to secondary diabetes ([facility] 2009    Diabetes [facility]     Diabetic peripheral neuropathy ([facility]     Diabetic retinopathy ([facility]     H/O transfusion of packed red blood cells 2009    Hypercholesterolemia     Hypertension      Social History     Social History    Marital status:      Spouse name: N/A    Number of children: N/A    Years of education: N/A     Social History Main Topics    Smoking status: Never Smoker    Smokeless tobacco: Not on file    Alcohol use Yes      Comment: rare    Drug use: Not on file    Sexual activity: Yes     Partners: Male     Birth control/ protection: None     Other Topics Concern    Not on file     Social History Narrative     Family History   Problem Relation Age of Onset    Hypertension Mother     Stroke Mother      TIA    Arthritis-osteo Mother     Cancer Mother      cytoblastoma    Hypertension Father     Diabetes Father     Heart Disease Father     Cancer Sister      colon    Diabetes Sister         Review of Systems   Constitutional: Negative for chills and fever. Respiratory: Negative for cough, shortness of breath and wheezing. Cardiovascular: Negative for chest pain, palpitations and leg swelling. Gastrointestinal: Negative for constipation, diarrhea, nausea and vomiting. Vitals:    01/19/18 1307   BP: 150/58   Pulse: (!) 59   Resp: 12   Temp: 98.5 °F (36.9 °C)   TempSrc: Oral   SpO2: 98%   Weight: 298 lb (135.2 kg)   Height: 5' 6\" (1.676 m)   PainSc:   0 - No pain       Physical Exam   Constitutional: She is oriented to person, place, and time and well-developed, well-nourished, and in no distress. Neck: Normal range of motion. Neck supple. Cardiovascular: Normal rate, regular rhythm and normal heart sounds. Pulmonary/Chest: Effort normal and breath sounds normal.   Neurological: She is alert and oriented to person, place, and time. Skin: Skin is warm and dry. Psychiatric: Mood, memory, affect and judgment normal.   Nursing note and vitals reviewed. Assessment/Plan      ICD-10-CM ICD-9-CM    1. Acute congestive heart failure, unspecified congestive heart failure type (HCC) I50.9 428.0    2. Obesity, morbid ([facility] E66.01 278.01      Will continue current tx    Follow-up Disposition:  Return in about 3 weeks (around 2/9/2018). lab results and schedule of future lab studies reviewed with patient    I have discussed the diagnosis with the patient and the intended plan of care as seen in the above orders. The patient has received an after-visit summary and questions were answered concerning future plans. I have discussed medication, side effects, and warnings with the patient in detail. The patient verbalized understanding and is in agreement with the plan of care. The patient will contact the office with any additional concerns. Discussed the patient's BMI with her. The BMI follow up plan is as follows:     dietary management education, guidance, and counseling  encourage exercise  monitor weight  prescribed dietary intake    An After Visit Summary was printed and given to the patient.     Alexandra Cohen MD

## 2018-01-24 ENCOUNTER — PATIENT OUTREACH (OUTPATIENT)
Dept: FAMILY MEDICINE CLINIC | Age: 63
End: 2018-01-24

## 2018-01-24 NOTE — PROGRESS NOTES
Transitions of Care Coordination    Follow up for hospitalization at [facility] 1/5-1/12/17 for a new diagnosis of diastolic CHF, CKD IV, renal artery stenosis and discharge to home with [facility] home care. Kaye Todd contacted the patient by telephone to perform CHF follow Up. Noted Priorities/Plan:  Obtain appointment with cardiologist.  Patient stated she will attend the HF Clinic at [facility] on 1/25/18 and was told the clinic will tell her about her cardiology appointment at that time. Daily Weight: 203.8  Zone: green   Signs/Symptoms: none    Needs addressed from pathway:     Week 1-4   Provide Daily Disease Management  (NN initiated)  ? Daily weight (Before Breakfast-  Daily Zone Identification (symptom management; weight, edema, SOB, activity/sleep changes)-notify provider immediately as indicated  ? Cardiac Low-sodium Diet (No added sodium; 1500mg as indicated). If  Diabetic: include carbohydrate controlled   ? Fluid restriction (if indicated)  ? Confirm follow-up appointments/transportation. Reschedule if needed. Additional assessment   ? Activity tolerance assessment   ? Medication Therapy   ? Diet/appetite assessment  ? ED/Hospital utilization  Psychosocial: Reassurance/emotional support   Monitoring:  ? Tele-monitoring    Education:    ? Support system identification ( eg: Caregiver, meal planning, community resources, family, friends, [employer], support group)   ? Health literacy for heart failure     ? Have you been to an ER/Hospital since discharge from [facility]? No      Have you followed up with PCP/Cardiologist/Specialist? PCP on 1/17/18    Transportation:  family owned car driven by . Diet: 2GM NA, diabetic, 1500 cc fluid restriction  Activity/ADLs: independent with ADLs. Exercises at the [facility] 1 x week. Medications Reconciled at this time:  Endorsed taking medications as directed  Home health:  Company/Completion: [facility] care.   Service completed as patient is not home bound. Social Support: spouse, family    Upcoming appointments:  Dr. Zo Rodriguez, nephrology, on 2/2/18; Dr. Gilmer Martinez, vascular, 2/9/18      Patient reminded that there is a physician on call 24 hours a day / 7 days a week (M-F 5pm to 8am and from Friday 5pm until Monday 8a for the weekend) should the patient have questions or concerns. Patient reminded to call 911 if situation is emergent or patient feels the situation is emergent. Pt verbalizes understanding. Goals Addressed             Most Recent       Heart Failure     Maintains daily weight. On track (1/24/2018)             Received new scale for HF clinic on 1/15/18       Understand CHF action plan. On track (1/24/2018)             1/16/18-Patient able to describe HF zones and action to take       Understands and adheres to diet. On track (1/24/2018)             1/16/17-Patient able to state diet-diabetic/2GM sodium/1500 cc fluid restriction         Post Hospitalization     Supportive resources in place to maintain patient in the community (ie. Home Health, DME equipment, refer to, medication assistant plan, etc.)                [facility] on 1/13/18.   1/24/18-Nevaeh EMANUEL completed.   Pt not homebound-attending HF Clinic at [facility]

## 2018-01-31 ENCOUNTER — PATIENT OUTREACH (OUTPATIENT)
Dept: FAMILY MEDICINE CLINIC | Age: 63
End: 2018-01-31

## 2018-01-31 NOTE — PROGRESS NOTES
Transitions of Care Coordination    Follow up for hospitalization at [facility] 1/5-1/12/17 for a new diagnosis of diastolic CHF, CKD IV, renal artery stenosis and discharge to home with [facility] care. Brandon Epstein contacted the patient by telephone to perform CHF follow Up. Noted Priorities/Plan:  Continue to work on meal planning. Attend upcoming appointments. Daily Weight: 204 pounds  Zone: green   Signs/Symptoms: none, sleeps on one flat pillow. Needs addressed from pathway: (document the week that pt is in the pathway) Week 3    Week 1-4   Provide Daily Disease Management  (NN initiated)  ? Daily weight (Before Breakfast-  Daily Zone Identification (symptom management; weight, edema, SOB, activity/sleep changes)-notify provider immediately as indicated  ? Cardiac Low-sodium Diet (No added sodium; 1500mg as indicated). If  Diabetic: include carbohydrate controlled   Fluid restriction (if indicated)  ? Comorbidity Management  ? Confirm follow-up appointments/transportation. Additional assessment   ? Activity tolerance assessment   ? Mediication Therapy:  Taking medictions as directed  ? Diet/appetite assessment  ? ED/Hospital utilization    Psychosocial: Reassurance/emotional support   Monitoring:  Home scale and BP cuff. Weighs daily and takes BP. Education:  ? Fluid status-Adherent to 1500 cc Fluid restriction         Have you been to an ER/Hospital since discharge from ? No      Have you followed up with PCP/Cardiologist/Specialist?  Attended PCP apt 1/19/18  Upcoming apts:  Cardiology 2/14/18-patient unable to remember name  Nephrology, Dr. Zo Rodriguez, 2/2/18  Vascular, Dr. Gilmer Martinez, 2/9/18  PCP, Dr. Zo Rodriguez, 2/12/18    Transportation:  family owned car driven by . Diet: 2GM NA, diabetic, 1500 cc fluid restriction  Activity/ADLs: independent with ADLs. Exercises at the [facility] 1 x week.    Medications Reconciled at this time:  Endorsed taking medications as directed  Home health: Company/Completion: [facility] care. Service completed as patient is not home bound. Social Support: spouse, family      Patient reminded that there is a physician on call 24 hours a day / 7 days a week (M-F 5pm to 8am and from Friday 5pm until Monday 8a for the weekend) should the patient have questions or concerns. Patient reminded to call 911 if situation is emergent or patient feels the situation is emergent. Pt verbalizes understanding. Goals Addressed             Most Recent       Heart Failure     Knowledge and adherence medication (ie. action, side effects, missed dose, etc.)   On track (1/31/2018)             1/15/18-Medications reviewed and organized into pill minder box by pharmacist at [address] [place] [address] endorsed taking as directed.  Maintains daily weight. On track (1/31/2018)             Received new scale for HF clinic on 1/15/18       Understand CHF action plan. On track (1/31/2018)             1/16/18-Patient able to describe HF zones and action to take       Understands and adheres to diet.    On track (1/31/2018)             1/16/17-Patient able to state diet-diabetic/2GM sodium/1500 cc fluid restriction

## 2018-02-02 ENCOUNTER — PATIENT OUTREACH (OUTPATIENT)
Dept: FAMILY MEDICINE CLINIC | Age: 63
End: 2018-02-02

## 2018-02-02 NOTE — PROGRESS NOTES
NN health screenings:    Noted patients recent admission and discharge from [place] [address] with new onset HFpEF. Will attempt to call Ms Lili Duarte @ a later date to pursue completion of CRC/Cervical cancer screenings.

## 2018-02-08 ENCOUNTER — PATIENT OUTREACH (OUTPATIENT)
Dept: FAMILY MEDICINE CLINIC | Age: 63
End: 2018-02-08

## 2018-02-08 NOTE — PROGRESS NOTES
Transitions of Care Coordination    Follow up for hospitalization at [facility] 1/5-1/12/17 for a new diagnosis of diastolic CHF, CKD IV, renal artery stenosis and discharge to home with [facility] care. Maira Calhoun contacted the patient by telephone to perform CHF follow Up. Noted Priorities/Plan:  Attend follow up appointments, continue with diet modifications    Daily Weight: 204 lbs decrease  Zone: green   Signs/Symptoms: no swelling or SOB; how many pillows to sleep? 1    Needs addressed from pathway: (document the week that pt is in the pathway) Week 4    Week 1-4   Provide Daily Disease Management  (NN initiated)  ? Daily weight (Before Breakfast-  Daily Zone Identification (symptom management; weight, edema, SOB, activity/sleep changes)-notify provider immediately as indicated  ? Cardiac Low-sodium Diet (No added sodium; 1500mg as indicated). If  Diabetic: include carbohydrate controlled   ? Fluid restriction (if indicated)  ? Comorbidity Management  ? Confirm follow-up appointments/transportation. Reschedule if needed. Additional assessment      ? Activity tolerance assessment   (eg: Vital signs; level of consciousness; dyspnea on exertion; pillow usage; recliner vs bed)   ? Energy conservation management (balance activity with rest)  ? Labs/diagnostics *as indicated  ? Medication Therapy *as directed  ? Diet/appetite assessment  Psychosocial: Reassurance/emotional support   Monitoring:  ? Home scale and BP cuff. Weighs daily and takes BP             Have you been to an ER/Hospital since discharge from [facility]? No      Have you followed up with PCP/Cardiologist/Specialist?   Nephrology, Dr. Sujatha Philip, 2/2/18    Upcoming apts:  Vascular, Dr. Anup Murphy, 2/9/18  Cardiology, 2/14/18  PCP, Dr. Sujatha Philip 2/12/18    Transportation:  regular car,   drives  Diet: 2GM NA, diabetic, 1500 cc fluid restriction  Activity/ADLs:  Independent with ADLs  Exercises at the [facility] 1 x week.   .   Medications Reconciled at this time:  Taking medications as directed. Updated change in Zaroxolyn  Home health:  Company/Completion: [facility] service completed  Social Support: spouse, family    Reached patient and identified self/role. Ms Bereket Marina stated \"I feel fine. I'm not having any problems. \" Patient agreeable to a return call in 2 weeks. Patient reminded that there is a physician on call 24 hours a day / 7 days a week (M-F 5pm to 8am and from Friday 5pm until Monday 8a for the weekend) should the patient have questions or concerns. Patient reminded to call 911 if situation is emergent or patient feels the situation is emergent. Pt verbalizes understanding. Goals Addressed             Most Recent       Heart Failure     Knowledge and adherence medication (ie. action, side effects, missed dose, etc.)   On track (2/8/2018)             1/15/18-Medications reviewed and organized into pill minder box by pharmacist at [address] [place] [address] endorsed taking as directed. 2/8/18-Medications reviewed-patient able to describe purpose of medications and understands to take Zaroxolyn every other day       Maintains daily weight. On track (2/8/2018)             Received new scale for HF clinic on 1/15/18  2/8/18-weighing daily-weight today 204       Understand CHF action plan. On track (2/8/2018)             1/16/18-Patient able to describe HF zones and action to take  2/8/18 assessed- able to state zone       Understands and adheres to diet.    On track (2/8/2018)             1/16/17-Patient able to state diet-diabetic/2GM sodium/1500 cc fluid restriction

## 2018-02-12 ENCOUNTER — OFFICE VISIT (OUTPATIENT)
Dept: FAMILY MEDICINE CLINIC | Age: 63
End: 2018-02-12

## 2018-02-12 VITALS
SYSTOLIC BLOOD PRESSURE: 142 MMHG | RESPIRATION RATE: 16 BRPM | DIASTOLIC BLOOD PRESSURE: 80 MMHG | OXYGEN SATURATION: 99 % | HEART RATE: 81 BPM | HEIGHT: 66 IN | TEMPERATURE: 98.6 F

## 2018-02-12 DIAGNOSIS — I50.9 CONGESTIVE HEART FAILURE, UNSPECIFIED CONGESTIVE HEART FAILURE CHRONICITY, UNSPECIFIED CONGESTIVE HEART FAILURE TYPE: ICD-10-CM

## 2018-02-12 DIAGNOSIS — I10 ESSENTIAL HYPERTENSION: Primary | ICD-10-CM

## 2018-02-12 DIAGNOSIS — E78.5 HYPERLIPIDEMIA, UNSPECIFIED HYPERLIPIDEMIA TYPE: ICD-10-CM

## 2018-02-12 RX ORDER — AMLODIPINE BESYLATE 10 MG/1
10 TABLET ORAL DAILY
Qty: 30 TAB | Refills: 2 | Status: SHIPPED | OUTPATIENT
Start: 2018-02-12 | End: 2018-03-14

## 2018-02-12 RX ORDER — CLOPIDOGREL BISULFATE 75 MG/1
75 TABLET ORAL DAILY
Qty: 30 TAB | Refills: 2 | Status: SHIPPED | OUTPATIENT
Start: 2018-02-12 | End: 2018-03-05 | Stop reason: SDUPTHER

## 2018-02-12 RX ORDER — VALSARTAN 80 MG/1
80 TABLET ORAL DAILY
Qty: 30 TAB | Refills: 2 | Status: SHIPPED | OUTPATIENT
Start: 2018-02-12 | End: 2018-03-05 | Stop reason: SDUPTHER

## 2018-02-12 RX ORDER — METOLAZONE 2.5 MG/1
2.5 TABLET ORAL DAILY
Qty: 30 TAB | Refills: 0 | Status: SHIPPED | OUTPATIENT
Start: 2018-02-12 | End: 2018-10-02 | Stop reason: SDUPTHER

## 2018-02-12 RX ORDER — HYDRALAZINE HYDROCHLORIDE 25 MG/1
25 TABLET, FILM COATED ORAL 3 TIMES DAILY
Qty: 90 TAB | Refills: 0 | Status: SHIPPED | OUTPATIENT
Start: 2018-02-12 | End: 2018-03-05 | Stop reason: SDUPTHER

## 2018-02-12 RX ORDER — FUROSEMIDE 40 MG/1
20 TABLET ORAL 2 TIMES DAILY
COMMUNITY
Start: 2018-02-10 | End: 2018-03-05 | Stop reason: SDUPTHER

## 2018-02-12 NOTE — PROGRESS NOTES
Clarice Hodgkins is a 70 y.o. female  presents for follow up of CHF. No SOB weakness or numbness. No new edema. No Known Allergies  Outpatient Prescriptions Marked as Taking for the 2/12/18 encounter (Office Visit) with Kapil Duckworth MD   Medication Sig Dispense Refill    furosemide (LASIX) 40 mg tablet 20 mg two (2) times a day.  amLODIPine (NORVASC) 10 mg tablet Take 1 Tab by mouth daily for 30 days. 30 Tab 2    metOLazone (ZAROXOLYN) 2.5 mg tablet Take 1 Tab by mouth daily for 30 days. 30 Tab 0    clopidogrel (PLAVIX) 75 mg tab Take 1 Tab by mouth daily for 30 days. 30 Tab 2    hydrALAZINE (APRESOLINE) 25 mg tablet Take 1 Tab by mouth three (3) times daily for 30 days. 90 Tab 0    valsartan (DIOVAN) 80 mg tablet Take 1 Tab by mouth daily for 30 days. 30 Tab 2    atorvastatin (LIPITOR) 80 mg tablet 80 mg.      insulin glargine (TOUJEO SOLOSTAR) 300 unit/mL (1.5 mL) inpn       omega 3-dha-epa-fish oil (FISH OIL) 60- mg cap Take 500 mg by mouth two (2) times a day.  multivitamin (TAB-A-VINCENT) tablet Take 1 Tab by mouth daily.  VITAMIN A PO Take 1 Tab by mouth daily.  black cohosh 540 mg cap Take  by mouth.  metoprolol succinate (TOPROL XL) 50 mg XL tablet Take  by mouth daily.  glimepiride (AMARYL) 4 mg tablet Take  by mouth every morning.  aspirin delayed-release 325 mg tablet Take  by mouth every six (6) hours as needed for Pain.  Cholecalciferol, Vitamin D3, (VITAMIN D3) 1,000 unit cap Take  by mouth.  ZINC ACETATE PO Take  by mouth.       BD SINGLE USE SWABS REGULAR padm       BD INSULIN SYRINGE ULTRA-FINE 1 mL 30 x 1/2\" syrg        Patient Active Problem List   Diagnosis Code    Hypertension I10    Type 2 diabetes mellitus ([address] E11.9    Hyperlipidemia E78.5    Diabetic retinopathy associated with type 2 diabetes mellitus ([address] E11.319    Diabetic peripheral neuropathy associated with type 2 diabetes mellitus ([address] E11.42    Charcot's joint arthropathy in type 2 diabetes mellitus (HCC) E11.610    Obesity E66.9    Mass of left breast on mammogram N63.20    ACP (advance care planning) Z71.89    Obesity, morbid ([facility] E66.01    Congestive heart failure ([facility] I50.9     Past Medical History:   Diagnosis Date    Charcot's joint disease due to secondary diabetes ([facility] 2009    Diabetes ([facility]     Di[facility]     Diabetic retinopathy ([facility]     H/O transfusion of packed red blood cells 2009    Hypercholesterolemia     Hypertension      Social History     Social History    Marital status:      Spouse name: N/A    Number of children: N/A    Years of education: N/A     Social History Main Topics    Smoking status: Never Smoker    Smokeless tobacco: Never Used    Alcohol use Yes      Comment: rare    Drug use: Not on file    Sexual activity: Yes     Partners: Male     Birth control/ protection: None     Other Topics Concern    Not on file     Social History Narrative     Family History   Problem Relation Age of Onset    Hypertension Mother     Stroke Mother      TIA    Arthritis-osteo Mother     Cancer Mother      cytoblastoma    Hypertension Father     Diabetes Father     Heart Disease Father     Cancer Sister      colon    Diabetes Sister         Review of Systems   Constitutional: Negative for chills and fever. Respiratory: Negative for cough and shortness of breath. Cardiovascular: Negative for chest pain and palpitations. Gastrointestinal: Negative for constipation, diarrhea, nausea and vomiting. Musculoskeletal: Negative. Vitals:    02/12/18 0900   BP: 142/80   Pulse: 81   Resp: 16   Temp: 98.6 °F (37 °C)   SpO2: 99%   Height: 5' 6\" (1.676 m)   PainSc:   0 - No pain       Physical Exam   Constitutional: She is oriented to person, place, and time and well-developed, well-nourished, and in no distress. Cardiovascular: Normal rate, regular rhythm and normal heart sounds. Pulmonary/Chest: Effort normal and breath sounds normal.   Neurological: She is alert and oriented to person, place, and time. Skin: Skin is warm and dry. Nursing note and vitals reviewed. Assessment/Plan      ICD-10-CM ICD-9-CM    1. Essential hypertension I10 401.9 amLODIPine (NORVASC) 10 mg tablet      clopidogrel (PLAVIX) 75 mg tab      hydrALAZINE (APRESOLINE) 25 mg tablet      valsartan (DIOVAN) 80 mg tablet   2. Hyperlipidemia, unspecified hyperlipidemia type E78.5 272.4    3. Congestive heart failure, unspecified congestive heart failure chronicity, unspecified congestive heart failure type (HCC) I50.9 428.0 furosemide (LASIX) 40 mg tablet      metOLazone (ZAROXOLYN) 2.5 mg tablet     I have discussed the diagnosis with the patient and the intended plan of care as seen in the above orders. The patient has received an after-visit summary and questions were answered concerning future plans. I have discussed medication, side effects, and warnings with the patient in detail. The patient verbalized understanding and is in agreement with the plan of care. The patient will contact the office with any additional concerns. Follow-up Disposition:  Return in about 2 months (around 4/12/2018).   lab results and schedule of future lab studies reviewed with patient    Shonna Dupont MD

## 2018-02-12 NOTE — PATIENT INSTRUCTIONS
High Blood Pressure: Care Instructions  Your Care Instructions    If your blood pressure is usually above 140/90, you have high blood pressure, or hypertension. That means the top number is 140 or higher or the bottom number is 90 or higher, or both. Despite what a lot of people think, high blood pressure usually doesn't cause headaches or make you feel dizzy or lightheaded. It usually has no symptoms. But it does increase your risk for heart attack, stroke, and kidney or eye damage. The higher your blood pressure, the more your risk increases. Your doctor will give you a goal for your blood pressure. Your goal will be based on your health and your age. An example of a goal is to keep your blood pressure below 140/90. Lifestyle changes, such as eating healthy and being active, are always important to help lower blood pressure. You might also take medicine to reach your blood pressure goal.  Follow-up care is a key part of your treatment and safety. Be sure to make and go to all appointments, and call your doctor if you are having problems. It's also a good idea to know your test results and keep a list of the medicines you take. How can you care for yourself at home? Medical treatment  · If you stop taking your medicine, your blood pressure will go back up. You may take one or more types of medicine to lower your blood pressure. Be safe with medicines. Take your medicine exactly as prescribed. Call your doctor if you think you are having a problem with your medicine. · Talk to your doctor before you start taking aspirin every day. Aspirin can help certain people lower their risk of a heart attack or stroke. But taking aspirin isn't right for everyone, because it can cause serious bleeding. · See your doctor regularly. You may need to see the doctor more often at first or until your blood pressure comes down.   · If you are taking blood pressure medicine, talk to your doctor before you take decongestants or anti-inflammatory medicine, such as ibuprofen. Some of these medicines can raise blood pressure. · Learn how to check your blood pressure at home. Lifestyle changes  · Stay at a healthy weight. This is especially important if you put on weight around the waist. Losing even 10 pounds can help you lower your blood pressure. · If your doctor recommends it, get more exercise. Walking is a good choice. Bit by bit, increase the amount you walk every day. Try for at least 30 minutes on most days of the week. You also may want to swim, bike, or do other activities. · Avoid or limit alcohol. Talk to your doctor about whether you can drink any alcohol. · Try to limit how much sodium you eat to less than 2,300 milligrams (mg) a day. Your doctor may ask you to try to eat less than 1,500 mg a day. · Eat plenty of fruits (such as bananas and oranges), vegetables, legumes, whole grains, and low-fat dairy products. · Lower the amount of saturated fat in your diet. Saturated fat is found in animal products such as milk, cheese, and meat. Limiting these foods may help you lose weight and also lower your risk for heart disease. · Do not smoke. Smoking increases your risk for heart attack and stroke. If you need help quitting, talk to your doctor about stop-smoking programs and medicines. These can increase your chances of quitting for good. When should you call for help? Call 911 anytime you think you may need emergency care. This may mean having symptoms that suggest that your blood pressure is causing a serious heart or blood vessel problem. Your blood pressure may be over 180/110. ? For example, call 911 if:  ? · You have symptoms of a heart attack. These may include:  ¨ Chest pain or pressure, or a strange feeling in the chest.  ¨ Sweating. ¨ Shortness of breath. ¨ Nausea or vomiting.   ¨ Pain, pressure, or a strange feeling in the back, neck, jaw, or upper belly or in one or both shoulders or arms.  ¨ Lightheadedness or sudden weakness. ¨ A fast or irregular heartbeat. ? · You have symptoms of a stroke. These may include:  ¨ Sudden numbness, tingling, weakness, or loss of movement in your face, arm, or leg, especially on only one side of your body. ¨ Sudden vision changes. ¨ Sudden trouble speaking. ¨ Sudden confusion or trouble understanding simple statements. ¨ Sudden problems with walking or balance. ¨ A sudden, severe headache that is different from past headaches. ? · You have severe back or belly pain. ?Do not wait until your blood pressure comes down on its own. Get help right away. ?Call your doctor now or seek immediate care if:  ? · Your blood pressure is much higher than normal (such as 180/110 or higher), but you don't have symptoms. ? · You think high blood pressure is causing symptoms, such as:  ¨ Severe headache. ¨ Blurry vision. ? Watch closely for changes in your health, and be sure to contact your doctor if:  ? · Your blood pressure measures 140/90 or higher at least 2 times. That means the top number is 140 or higher or the bottom number is 90 or higher, or both. ? · You think you may be having side effects from your blood pressure medicine. ? · Your blood pressure is usually normal, but it goes above normal at least 2 times. Where can you learn more? Go to http://lei-aleah.info/. Enter Q583 in the search box to learn more about \"High Blood Pressure: Care Instructions. \"  Current as of: September 21, 2016  Content Version: 11.4  © 6081-0957 [employer]. Care instructions adapted under license by [employer] (which disclaims liability or warranty for this information). If you have questions about a medical condition or this instruction, always ask your healthcare professional. [place] [address] any warranty or liability for your use of this information.

## 2018-02-12 NOTE — MR AVS SNAPSHOT
[facility] [address] [place] [address] Road 
[phone] Patient: Loulou Whitfield MRN: [id number] SXS:[id number] Visit Information Date & Time Provider Department Dept. Phone Encounter #  
 2/12/2018  8:30 AM Aurelio Rajan [address] [phone] [id number] Follow-up Instructions Return in about 2 months (around 4/12/2018). Your Appointments 3/16/2018  9:00 AM  
COLON SCREEN with TSS HBV NURSE VISIT [place] [address] ([address]) Appt Note: Ref from Aurelio Rajan MD-cn screen [phone] [address] [facility] Upcoming Health Maintenance Date Due Hepatitis C Screening 1955 HEMOGLOBIN A1C Q6M 1955 LIPID PANEL Q1 1955 FOOT EXAM Q1 2/9/1963 PAP AKA CERVICAL CYTOLOGY 2/9/1974 FOBT Q 1 YEAR AGE 50-75 2/9/2003 ZOSTER VACCINE AGE 60> 12/9/2012 OSTEOPOROSIS SCREENING (DEXA) 2/9/2018 Pneumococcal 65+ Low/Medium Risk (1 of 2 - PCV13) 2/9/2018 MICROALBUMIN Q1 10/27/2018 MEDICARE YEARLY EXAM 10/28/2018 EYE EXAM RETINAL OR DILATED Q1 12/14/2018 BREAST CANCER SCRN MAMMOGRAM 10/31/2019 GLAUCOMA SCREENING Q2Y 12/14/2019 DTaP/Tdap/Td series (2 - Td) 1/19/2028 Allergies as of 2/12/2018  Review Complete On: 1/19/2018 By: Aurelio Rajan MD  
 No Known Allergies Current Immunizations  Never Reviewed Name Date Influenza Vaccine 10/1/2017 12:00 AM  
 Influenza Vaccine (Quad) PF 10/27/2017 Not reviewed this visit You Were Diagnosed With   
  
 Codes Comments Essential hypertension    -  Primary ICD-10-CM: I10 
ICD-9-CM: 401.9 Hyperlipidemia, unspecified hyperlipidemia type     ICD-10-CM: E78.5 ICD-9-CM: 272.4 Vitals BP Pulse Temp Resp Height(growth percentile) SpO2  
 142/80 81 98.6 °F (37 °C) 16 5' 6\" (1.676 m) 99% OB Status Smoking Status Menopause Never Smoker Preferred Pharmacy Pharmacy Name Phone [id number] - [address] [phone] Your Updated Medication List  
  
   
This list is accurate as of: 2/12/18  9:07 AM.  Always use your most recent med list.  
  
  
  
  
 AMARYL 4 mg tablet Generic drug:  glimepiride Take  by mouth every morning. amLODIPine 10 mg tablet Commonly known as:  Cookie Boozer Take 1 Tab by mouth daily for 30 days. aspirin delayed-release 325 mg tablet Take  by mouth every six (6) hours as needed for Pain. atorvastatin 80 mg tablet Commonly known as:  LIPITOR 80 mg.  
  
 BD INSULIN SYRINGE ULTRA-FINE 1 mL 30 gauge x 1/2\" Syrg Generic drug:  Insulin Syringe-Needle U-100 BD Single Use Swabs Regular Padm Generic drug:  alcohol swabs  
  
 black cohosh 540 mg Cap Take  by mouth. clopidogrel 75 mg Tab Commonly known as:  PLAVIX Take 1 Tab by mouth daily for 30 days. FISH OIL 60- mg Cap Generic drug:  omega 3-dha-epa-fish oil Take 500 mg by mouth two (2) times a day. furosemide 40 mg tablet Commonly known as:  LASIX  
20 mg two (2) times a day. hydrALAZINE 25 mg tablet Commonly known as:  APRESOLINE Take 1 Tab by mouth three (3) times daily for 30 days. metOLazone 2.5 mg tablet Commonly known as:  [place] Glad Take 1 Tab by mouth daily for 30 days. TAB-A-VINCENT tablet Generic drug:  multivitamin Take 1 Tab by mouth daily. TOPROL XL 50 mg XL tablet Generic drug:  metoprolol succinate Take  by mouth daily. * TOUJEO SOLOSTAR SC  
by SubCUTAneous route. * TOUJEO SOLOSTAR 300 unit/mL (1.5 mL) Inpn Generic drug:  insulin glargine  
  
 valsartan 80 mg tablet Commonly known as:  DIOVAN  
 Take 1 Tab by mouth daily for 30 days. VITAMIN A PO Take 1 Tab by mouth daily. VITAMIN D3 1,000 unit Cap Generic drug:  cholecalciferol Take  by mouth. ZINC ACETATE PO Take  by mouth. * Notice: This list has 2 medication(s) that are the same as other medications prescribed for you. Read the directions carefully, and ask your doctor or other care provider to review them with you. Prescriptions Sent to Pharmacy Refills  
 amLODIPine (NORVASC) 10 mg tablet 2 Sig: Take 1 Tab by mouth daily for 30 days. Class: Normal  
 Pharmacy: [address] #: [phone] Route: Oral  
 metOLazone (ZAROXOLYN) 2.5 mg tablet 0 Sig: Take 1 Tab by mouth daily for 30 days. Class: Normal  
 Pharmacy: [address] #: [phone] Route: Oral  
 clopidogrel (PLAVIX) 75 mg tab 2 Sig: Take 1 Tab by mouth daily for 30 days. Class: Normal  
 Pharmacy: [address] #: [phone] Route: Oral  
 hydrALAZINE (APRESOLINE) 25 mg tablet 0 Sig: Take 1 Tab by mouth three (3) times daily for 30 days. Class: Normal  
 Pharmacy: [address] #: [phone] Route: Oral  
 valsartan (DIOVAN) 80 mg tablet 2 Sig: Take 1 Tab by mouth daily for 30 days. Class: Normal  
 Pharmacy: [address] #: [phone] Route: Oral  
  
Follow-up Instructions Return in about 2 months (around 4/12/2018). Patient Instructions High Blood Pressure: Care Instructions Your Care Instructions If your blood pressure is usually above 140/90, you have high blood pressure, or hypertension. That means the top number is 140 or higher or the bottom number is 90 or higher, or both. Despite what a lot of people think, high blood pressure usually doesn't cause headaches or make you feel dizzy or lightheaded. It usually has no symptoms. But it does increase your risk for heart attack, stroke, and kidney or eye damage. The higher your blood pressure, the more your risk increases. Your doctor will give you a goal for your blood pressure. Your goal will be based on your health and your age. An example of a goal is to keep your blood pressure below 140/90. Lifestyle changes, such as eating healthy and being active, are always important to help lower blood pressure. You might also take medicine to reach your blood pressure goal. 
Follow-up care is a key part of your treatment and safety. Be sure to make and go to all appointments, and call your doctor if you are having problems. It's also a good idea to know your test results and keep a list of the medicines you take. How can you care for yourself at home? Medical treatment · If you stop taking your medicine, your blood pressure will go back up. You may take one or more types of medicine to lower your blood pressure. Be safe with medicines. Take your medicine exactly as prescribed. Call your doctor if you think you are having a problem with your medicine. · Talk to your doctor before you start taking aspirin every day. Aspirin can help certain people lower their risk of a heart attack or stroke. But taking aspirin isn't right for everyone, because it can cause serious bleeding. · See your doctor regularly. You may need to see the doctor more often at first or until your blood pressure comes down. · If you are taking blood pressure medicine, talk to your doctor before you take decongestants or anti-inflammatory medicine, such as ibuprofen. Some of these medicines can raise blood pressure. · Learn how to check your blood pressure at home. Lifestyle changes · Stay at a healthy weight.  This is especially important if you put on weight around the waist. Losing even 10 pounds can help you lower your blood pressure. · If your doctor recommends it, get more exercise. Walking is a good choice. Bit by bit, increase the amount you walk every day. Try for at least 30 minutes on most days of the week. You also may want to swim, bike, or do other activities. · Avoid or limit alcohol. Talk to your doctor about whether you can drink any alcohol. · Try to limit how much sodium you eat to less than 2,300 milligrams (mg) a day. Your doctor may ask you to try to eat less than 1,500 mg a day. · Eat plenty of fruits (such as bananas and oranges), vegetables, legumes, whole grains, and low-fat dairy products. · Lower the amount of saturated fat in your diet. Saturated fat is found in animal products such as milk, cheese, and meat. Limiting these foods may help you lose weight and also lower your risk for heart disease. · Do not smoke. Smoking increases your risk for heart attack and stroke. If you need help quitting, talk to your doctor about stop-smoking programs and medicines. These can increase your chances of quitting for good. When should you call for help? Call 911 anytime you think you may need emergency care. This may mean having symptoms that suggest that your blood pressure is causing a serious heart or blood vessel problem. Your blood pressure may be over 180/110. ? For example, call 911 if: 
? · You have symptoms of a heart attack. These may include: ¨ Chest pain or pressure, or a strange feeling in the chest. 
¨ Sweating. ¨ Shortness of breath. ¨ Nausea or vomiting. ¨ Pain, pressure, or a strange feeling in the back, neck, jaw, or upper belly or in one or both shoulders or arms. ¨ Lightheadedness or sudden weakness. ¨ A fast or irregular heartbeat. ? · You have symptoms of a stroke. These may include: 
¨ Sudden numbness, tingling, weakness, or loss of movement in your face, arm, or leg, especially on only one side of your body. ¨ Sudden vision changes. ¨ Sudden trouble speaking. ¨ Sudden confusion or trouble understanding simple statements. ¨ Sudden problems with walking or balance. ¨ A sudden, severe headache that is different from past headaches. ? · You have severe back or belly pain. ?Do not wait until your blood pressure comes down on its own. Get help right away. ?Call your doctor now or seek immediate care if: 
? · Your blood pressure is much higher than normal (such as 180/110 or higher), but you don't have symptoms. ? · You think high blood pressure is causing symptoms, such as: ¨ Severe headache. ¨ Blurry vision. ? Watch closely for changes in your health, and be sure to contact your doctor if: 
? · Your blood pressure measures 140/90 or higher at least 2 times. That means the top number is 140 or higher or the bottom number is 90 or higher, or both. ? · You think you may be having side effects from your blood pressure medicine. ? · Your blood pressure is usually normal, but it goes above normal at least 2 times. Where can you learn more? Go to http://lei-aleah.info/. Enter S988 in the search box to learn more about \"High Blood Pressure: Care Instructions. \" Current as of: September 21, 2016 Content Version: 11.4 © 6362-0179 [employer]. Care instructions adapted under license by [employer] (which disclaims liability or warranty for this information). If you have questions about a medical condition or this instruction, always ask your healthcare professional. [place] [address] any warranty or liability for your use of this information. Introducing [facility]! [facility] introduces Chef patient portal. Now you can access parts of your medical record, email your doctor's office, and request medication refills online. 1. In your internet browser, go to [email or web address] 2. Click on the First Time User? Click Here link in the Sign In box. You will see the New Member Sign Up page. 3. Enter your [employer] Access Code exactly as it appears below. You will not need to use this code after youve completed the sign-up process. If you do not sign up before the expiration date, you must request a new code. · [employer] Access Code: [email or web address] Expires: 5/13/2018  9:07 AM 
 
4. Enter the last four digits of your Social Security Number (xxxx) and Date of Birth (mm/dd/yyyy) as indicated and click Submit. You will be taken to the next sign-up page. 5. Create a [employer] ID. This will be your [employer] login ID and cannot be changed, so think of one that is secure and easy to remember. 6. Create a [employer] password. You can change your password at any time. 7. Enter your Password Reset Question and Answer. This can be used at a later time if you forget your password. 8. Enter your e-mail address. You will receive e-mail notification when new information is available in [address] 9. Click Sign Up. You can now view and download portions of your medical record. 10. Click the Download Summary menu link to download a portable copy of your medical information. If you have questions, please visit the Frequently Asked Questions section of the [employer] website. Remember, [employer] is NOT to be used for urgent needs. For medical emergencies, dial 911. Now available from your iPhone and Android! Please provide this summary of care documentation to your next provider. Your primary care clinician is listed as [address]. If you have any questions after today's visit, please call [phone].

## 2018-02-16 NOTE — TELEPHONE ENCOUNTER
This patient contacted office for the following prescriptions to be filled:    Medication requested :   Requested Prescriptions     Pending Prescriptions Disp Refills    cloNIDine HCl (CATAPRES) 0.3 mg tablet 60 Tab 0     Sig: Take 1 Tab by mouth two (2) times a day for 30 days.    (Was prescribed by hospital, but hss followed up with Dr. Nilay Goff since then)  PCP: Dr. Perez Salazar or [facility]  Mail order or Local pharmacy: [phone]    Scheduled appointment if not seen by current providers in office: LOV 2/12/18, next appt 4/12/18

## 2018-02-19 RX ORDER — CLONIDINE HYDROCHLORIDE 0.3 MG/1
0.3 TABLET ORAL 2 TIMES DAILY
Qty: 60 TAB | Refills: 0 | Status: SHIPPED | OUTPATIENT
Start: 2018-02-19 | End: 2018-03-05 | Stop reason: SDUPTHER

## 2018-02-20 ENCOUNTER — PATIENT OUTREACH (OUTPATIENT)
Dept: FAMILY MEDICINE CLINIC | Age: 63
End: 2018-02-20

## 2018-02-20 NOTE — ACP (ADVANCE CARE PLANNING)
Non-Provider Advance Care Planning (ACP) Note    Date of ACP Conversation: 2/20/2018     Persons included in Conversation: patient     Length of ACP Conversation in minutes: <16 minutes (Non-Billable)    Conversation requested by: nurse navigator    Authorized Decision MakerThis person is: unknown    Patient stated an ACP was completed while hospitallzed in January of 2018 at a [facility] and is on file with the [employer] will Registry. Patient agreeable to provide a copy at next visit to PCP office.

## 2018-02-20 NOTE — PROGRESS NOTES
Transitions of Care Coordination    [facility] [address] [facility] for a new diagnosis of diastolic CHF, CKD IV, renal artery stenosis and discharge to home with [facility] home care. Alda Phillips contacted the patient by telephone to perform CHF follow Up. Noted Priorities/Plan:  Watch blood sugar and eat evening snack if low, adjust insulin, work in the garden    Daily Weight: 201.6  Which is a decrease  Zone: green   Signs/Symptoms: Swelling none; SOB none; how many pillows to sleep? one    Needs addressed from pathway: (document the week that pt is in the pathway) Week 6    Week 5-8   Provide Daily Disease Management (patient/caregiver initiated)  ? Daily weight (Before Breakfast  ? Daily Zone Identification (symptom management; weight, edema, SOB, activity/sleep changes)-notify provider immediately as indicated  ? Cardiac Low-sodium Diet (No added sodium; 1500mg as indicated). If  Diabetic:  include carbohydrate controlled   ? Fluid restriction (if indicated)  ? Comorbidity Management    Additional Assessments  ? Activity tolerance assessment   (eg: Vital signs; level of consciousness; dyspnea on exertion; pillow usage; recliner vs bed)  ? Labs/diagnostics- 2/2/18 done by nephrologist  ? Medication Therapy   ? Diet/appetite assessment-following diet, appetite good  Immunizations up to date- flu shot 10/2017  Education:  [address] status (follow up)         Have you been to an ER/Hospital since discharge from [address] [facility]?    No      Have you followed up with PCP/Cardiologist/Specialist?     Dr. Lexi Arndt, vascular, 2/9/18  Dr. Shantel Tim, PCP, 2/12/18  Dr. Fam Mojica, cardiology, 2/14/18      Transportation:  regular car,  drives  Diet:  2GM NA, diabetic, 1500 cc fluid restriction  Activity/ADLs:  Independent with ADLs, exercises at [facility]  Medications Reconciled at this time:   Home health:  Company/Completion:  [employer]-service completed  Social Support:  Spouse, family    Advanced Care Plan: Patient completed an ACP while in [facility] and stated it is on the Living Will Registry. Agreeable to   provide a copy at next PCP apt. Reached patient and identified self/role. Ms Samanta Pickens stated \"I am doing just fine. I had a lot of doctor's appointment in the last two weeks. \"    Chart reviewed. Patient's weight at last apt with Dr. Gomez Bartholomew is listed as 298 lbs. Today patient stated he weight is 201.6. Discussed difference. Ms Samanta Pickens stated she has never weighed 298 lbs and there must be a mistake. Reviewed diet and blood sugar readings. Patient stated her blood sugars have been decreasing and she has discussed this with her endocrinologist and been advised to decrease Toujeo by 4 units in the morning if she has low morning or evening blood sugars. Patient stated she is eating better and thus blood sugars are lower. Ms Samanta Pickens was able to verbalize what she does when her blood sugar is low-in the 60's. Ms Samanta Pickens is agreeable to a CB in about 2 weeks. Patient reminded that there is a physician on call 24 hours a day / 7 days a week (M-F 5pm to 8am and from Friday 5pm until Monday 8a for the weekend) should the patient have questions or concerns. Patient reminded to call 911 if situation is emergent or patient feels the situation is emergent. Pt verbalizes understanding.

## 2018-02-27 ENCOUNTER — PATIENT OUTREACH (OUTPATIENT)
Dept: FAMILY MEDICINE CLINIC | Age: 63
End: 2018-02-27

## 2018-02-27 ENCOUNTER — TELEPHONE (OUTPATIENT)
Dept: FAMILY MEDICINE CLINIC | Age: 63
End: 2018-02-27

## 2018-02-27 NOTE — PROGRESS NOTES
NN health screening:    Noted in [place] today Ms Jesus Henry is rescheduled to meet with Maya Fair, Dr. Laney Romo nurse in prep for colonoscopy post positive FIt last year. Will continue to follow.

## 2018-02-27 NOTE — TELEPHONE ENCOUNTER
I called [facility], Dr. Chandan Damon office #[phone] to f/u on the referral from September. Their office confirmed that they received the referral but the patient never scheduled with them. The lady I spoke with said if the patient is still willing to schedule she is more than happy to contact her to do so but just a heads up Dr. Elizabeth Oliveros is scheduling into June. I also put a note on the April appt with Dr. Sundeep Martinez b/c there is no recent A1c on file if she hasn't seen endo by then.

## 2018-02-27 NOTE — TELEPHONE ENCOUNTER
Called patient had her verify her  asked her about an appointment with Endocrinology she states she is seeing Dr. Dori Jorge in [place] and has been seeing him for years. Told I will call his office to have him fax over her most recent A1c she states she just had this checked and it was at 8.1.

## 2018-03-05 DIAGNOSIS — I10 ESSENTIAL HYPERTENSION: ICD-10-CM

## 2018-03-05 DIAGNOSIS — I50.9 CONGESTIVE HEART FAILURE, UNSPECIFIED CONGESTIVE HEART FAILURE CHRONICITY, UNSPECIFIED CONGESTIVE HEART FAILURE TYPE: ICD-10-CM

## 2018-03-05 RX ORDER — FUROSEMIDE 40 MG/1
20 TABLET ORAL 2 TIMES DAILY
Qty: 90 TAB | Refills: 0 | Status: SHIPPED | OUTPATIENT
Start: 2018-03-05 | End: 2018-06-08 | Stop reason: SDUPTHER

## 2018-03-05 RX ORDER — HYDRALAZINE HYDROCHLORIDE 25 MG/1
25 TABLET, FILM COATED ORAL 3 TIMES DAILY
Qty: 270 TAB | Refills: 0 | Status: SHIPPED | OUTPATIENT
Start: 2018-03-05 | End: 2018-04-04

## 2018-03-05 RX ORDER — CLONIDINE HYDROCHLORIDE 0.3 MG/1
0.3 TABLET ORAL 2 TIMES DAILY
Qty: 180 TAB | Refills: 0 | Status: SHIPPED | OUTPATIENT
Start: 2018-03-05 | End: 2018-04-04

## 2018-03-05 RX ORDER — VALSARTAN 80 MG/1
80 TABLET ORAL DAILY
Qty: 89 TAB | Refills: 0 | Status: SHIPPED | OUTPATIENT
Start: 2018-03-05 | End: 2018-04-04

## 2018-03-05 RX ORDER — CLOPIDOGREL BISULFATE 75 MG/1
75 TABLET ORAL DAILY
Qty: 90 TAB | Refills: 0 | Status: SHIPPED | OUTPATIENT
Start: 2018-03-05 | End: 2018-04-04

## 2018-03-05 NOTE — TELEPHONE ENCOUNTER
Patient called the office asking for refills on her BP medication she is asking for 90 day supply to be sent to [facility] in [place].

## 2018-03-16 ENCOUNTER — OFFICE VISIT (OUTPATIENT)
Dept: SURGERY | Age: 63
End: 2018-03-16

## 2018-03-16 VITALS
RESPIRATION RATE: 16 BRPM | WEIGHT: 214 LBS | HEART RATE: 59 BPM | BODY MASS INDEX: 34.39 KG/M2 | TEMPERATURE: 97.7 F | OXYGEN SATURATION: 99 % | HEIGHT: 66 IN

## 2018-03-16 DIAGNOSIS — Z12.11 COLON CANCER SCREENING: Primary | ICD-10-CM

## 2018-03-16 NOTE — MR AVS SNAPSHOT
[address] [facility] [address] [facility] 
[phone] Patient: Denice Cardoza MRN: [id number] [facility]:4/6/8737 Visit Information Date & Time Provider Department Dept. Phone Encounter #  
 3/16/2018  9:00 AM TSS HBV NURSE VISIT Janiya [facility] [phone] [id number] Your Appointments 3/16/2018  9:00 AM  
COLON SCREEN with TSS HBV NURSE VISIT [address] ([address]) Appt Note: Ref from Stephen Gonzales MD-cn screen [address] [facility] [address] [facility] [address] [phone]  
  
    
 4/12/2018  8:30 AM  
FOLLOW UP EXAM with Stephen Gonzales MD  
[facility] ([address]) Appt Note: 2 month f/u CHF  
 [address] [facility] [address] [place] [address]  
[phone]  
  
   
 [facility] [address] [place] [address] Upcoming Health Maintenance Date Due Hepatitis C Screening 1955 HEMOGLOBIN A1C Q6M 1955 LIPID PANEL Q1 1955 FOOT EXAM Q1 2/9/1963 FOBT Q 1 YEAR AGE 50-75 2/9/2003 ZOSTER VACCINE AGE 60> 12/9/2012 Bone Densitometry (Dexa) Screening 2/9/2018 Pneumococcal 65+ Low/Medium Risk (1 of 2 - PCV13) 2/9/2018 MICROALBUMIN Q1 10/27/2018 MEDICARE YEARLY EXAM 10/28/2018 EYE EXAM RETINAL OR DILATED Q1 12/14/2018 BREAST CANCER SCRN MAMMOGRAM 10/31/2019 GLAUCOMA SCREENING Q2Y 12/14/2019 DTaP/Tdap/Td series (2 - Td) 1/19/2028 Allergies as of 3/16/2018  Review Complete On: 2/12/2018 By: Stephen Gonzales MD  
 No Known Allergies Current Immunizations  Never Reviewed Name Date Influenza Vaccine 10/1/2017 12:00 AM  
 Influenza Vaccine (Quad) PF 10/27/2017 Not reviewed this visit Vitals Pulse Temp Resp Height(growth percentile) Weight(growth percentile) SpO2  
 (!) 59 97.7 °F (36.5 °C) (Oral) 16 5' 6\" (1.676 m) 214 lb (97.1 kg) 99% BMI OB Status Smoking Status 34.54 kg/m2 Menopause Never Smoker Vitals History BMI and BSA Data Body Mass Index Body Surface Area 34.54 kg/m 2 2.13 m 2 Preferred Pharmacy Pharmacy Name Phone [id number] - [address], [address] [phone] Your Updated Medication List  
  
   
This list is accurate as of 3/16/18  8:56 AM.  Always use your most recent med list.  
  
  
  
  
 AMARYL 4 mg tablet Generic drug:  glimepiride Take  by mouth every morning. aspirin delayed-release 325 mg tablet Take  by mouth every six (6) hours as needed for Pain. atorvastatin 80 mg tablet Commonly known as:  LIPITOR 80 mg.  
  
 BD INSULIN SYRINGE ULTRA-FINE 1 mL 30 gauge x 1/2\" Syrg Generic drug:  Insulin Syringe-Needle U-100 BD Single Use Swabs Regular Padm Generic drug:  alcohol swabs  
  
 black cohosh 540 mg Cap Take  by mouth.  
  
 cloNIDine HCl 0.3 mg tablet Commonly known as:  CATAPRES Take 1 Tab by mouth two (2) times a day for 30 days. clopidogrel 75 mg Tab Commonly known as:  PLAVIX Take 1 Tab by mouth daily for 30 days. FISH OIL 60- mg Cap Generic drug:  omega 3-dha-epa-fish oil Take 500 mg by mouth two (2) times a day. furosemide 40 mg tablet Commonly known as:  LASIX Take 0.5 Tabs by mouth two (2) times a day. hydrALAZINE 25 mg tablet Commonly known as:  APRESOLINE Take 1 Tab by mouth three (3) times daily for 30 days. TAB-A-VINCENT tablet Generic drug:  multivitamin Take 1 Tab by mouth daily. TOPROL XL 50 mg XL tablet Generic drug:  metoprolol succinate Take  by mouth daily. * TOUJEO SOLOSTAR SC  
38 Units by SubCUTAneous route. * TOUJEO SOLOSTAR U-300 INSULIN 300 unit/mL (1.5 mL) Inpn Generic drug:  insulin glargine  
  
 valsartan 80 mg tablet Commonly known as:  DIOVAN Take 1 Tab by mouth daily for 30 days. VITAMIN A PO Take 1 Tab by mouth daily. VITAMIN D3 1,000 unit Cap Generic drug:  cholecalciferol Take  by mouth. ZINC ACETATE PO Take  by mouth. * Notice: This list has 2 medication(s) that are the same as other medications prescribed for you. Read the directions carefully, and ask your doctor or other care provider to review them with you. Introducing [facility]! [facility] introduces Whim patient portal. Now you can access parts of your medical record, email your doctor's office, and request medication refills online. 1. In your internet browser, go to [email or web address] 2. Click on the First Time User? Click Here link in the Sign In box. You will see the New Member Sign Up page. 3. Enter your Whim Access Code exactly as it appears below. You will not need to use this code after youve completed the sign-up process. If you do not sign up before the expiration date, you must request a new code. · Whim Access Code: [email or web address] Expires: 5/13/2018 10:07 AM 
 
4. Enter the last four digits of your Social Security Number (xxxx) and Date of Birth (mm/dd/yyyy) as indicated and click Submit. You will be taken to the next sign-up page. 5. Create a Whim ID. This will be your Whim login ID and cannot be changed, so think of one that is secure and easy to remember. 6. Create a Whim password. You can change your password at any time. 7. Enter your Password Reset Question and Answer. This can be used at a later time if you forget your password. 8. Enter your e-mail address. You will receive e-mail notification when new information is available in [address] 9. Click Sign Up.  You can now view and download portions of your medical record. 10. Click the Download Summary menu link to download a portable copy of your medical information. If you have questions, please visit the Frequently Asked Questions section of the [employer] website. Remember, [employer] is NOT to be used for urgent needs. For medical emergencies, dial 911. Now available from your iPhone and Android! Please provide this summary of care documentation to your next provider. Your primary care clinician is listed as [id number] [facility]. If you have any questions after today's visit, please call [phone].

## 2018-03-16 NOTE — PROGRESS NOTES
Review of Systems   Constitutional: Negative. HENT: Positive for sore throat. Negative for congestion, ear discharge, ear pain, hearing loss, nosebleeds, sinus pain and tinnitus. Left side throat   Eyes: Positive for blurred vision and double vision. Negative for photophobia, pain, discharge and redness. Retinopathy   Respiratory: Positive for cough. Negative for hemoptysis, sputum production, shortness of breath, wheezing and stridor. Cardiovascular: Negative. Gastrointestinal: Negative. Genitourinary: Negative. Musculoskeletal: Positive for back pain, joint pain and neck pain. Negative for falls and myalgias. Arthritis   Skin: Negative. Neurological: Positive for dizziness. Negative for tingling, tremors, sensory change, speech change, focal weakness, seizures, loss of consciousness and headaches. Endo/Heme/Allergies: Negative. Psychiatric/Behavioral: Negative. Colon Screen    Patient: Soledad Johnson MRN: [id number]  SSN: [id number]    YOB: 1955  Age: 70 y.o. Sex: female        Subjective:   Soledad Sender was referred by her PCP, Chuck Castaneda MD.  Patient referred for colonoscopy for   Family history of coloretal cancer (screening only). Patient denies rectal pain or bleeding. Abdominal surgeries as described below, specifically none. Family history as described below, specifically sister with colon cancer. Last colonoscopy was 10 years ago patient doesn't recall who performed this, but she did have polyps.     No Known Allergies    Past Medical History:   Diagnosis Date    Burning with urination     Charcot's joint disease due to secondary diabetes ([address] 2009    Diabetes ([address]     Diabetic peripheral neuropathy ([address]     Diabetic retinopathy ([address]     H/O transfusion of packed red blood cells 2009    Hypercholesterolemia     Hypertension      Past Surgical History:   Procedure Laterality Date    HX CATARACT REMOVAL Bilateral 2003    HX COLONOSCOPY  2008    hx of polyps    HX ORTHOPAEDIC  2011    LT ANKLE-CHARCOT JOINT, metal renard placed    HX ORTHOPAEDIC      right ankle, metal renard placed    HX RETINAL DETACHMENT REPAIR Bilateral 2003      Family History   Problem Relation Age of Onset    Hypertension Mother     Stroke Mother      TIA    Arthritis-osteo Mother     Cancer Mother      cytoblastoma    Hypertension Father     Diabetes Father     Heart Disease Father     Cancer Sister      colon    Diabetes Sister      Social History   Substance Use Topics    Smoking status: Never Smoker    Smokeless tobacco: Never Used    Alcohol use Yes      Comment: rare      Prior to Admission medications    Medication Sig Start Date End Date Taking? Authorizing Provider   cloNIDine HCl (CATAPRES) 0.3 mg tablet Take 1 Tab by mouth two (2) times a day for 30 days. 3/5/18 4/4/18 Yes Peg Carrington MD   hydrALAZINE (APRESOLINE) 25 mg tablet Take 1 Tab by mouth three (3) times daily for 30 days. 3/5/18 4/4/18 Yes Peg Carrington MD   clopidogrel (PLAVIX) 75 mg tab Take 1 Tab by mouth daily for 30 days. 3/5/18 4/4/18 Yes Peg Carrington MD   furosemide (LASIX) 40 mg tablet Take 0.5 Tabs by mouth two (2) times a day. 3/5/18  Yes Peg Carrington MD   valsartan (DIOVAN) 80 mg tablet Take 1 Tab by mouth daily for 30 days. 3/5/18 4/4/18 Yes Peg Carrington MD   atorvastatin (LIPITOR) 80 mg tablet 80 mg. Yes Historical Provider   insulin glargine (TOUJEO SOLOSTAR) 300 unit/mL (1.5 mL) inpn  12/13/17  Yes Historical Provider   omega 3-dha-epa-fish oil (FISH OIL) 60- mg cap Take 500 mg by mouth two (2) times a day. Yes Historical Provider   multivitamin (TAB-A-VINCENT) tablet Take 1 Tab by mouth daily. Yes Historical Provider   VITAMIN A PO Take 1 Tab by mouth daily. Yes Historical Provider   black cohosh 540 mg cap Take  by mouth. Yes Historical Provider   INSULIN GLARGINE,HUM. REC. ANLOG (TOUJEO SOLOSTAR SC) 38 Units by SubCUTAneous route. Yes Historical Provider   metoprolol succinate (TOPROL XL) 50 mg XL tablet Take  by mouth daily. Yes Historical Provider   glimepiride (AMARYL) 4 mg tablet Take  by mouth every morning. Yes Historical Provider   Cholecalciferol, Vitamin D3, (VITAMIN D3) 1,000 unit cap Take  by mouth. Yes Historical Provider   ZINC ACETATE PO Take  by mouth. Yes Historical Provider   BD SINGLE USE SWABS REGULAR padm  6/27/14  Yes Historical Provider   BD INSULIN SYRINGE ULTRA-FINE 1 mL 30 x 1/2\" syrg  5/20/14  Yes Historical Provider   aspirin delayed-release 325 mg tablet Take  by mouth every six (6) hours as needed for Pain. Historical Provider          Review of Systems:      Risks colonoscopy described- colon injury, missed lesion, anesthesia problems, bleeding       Ashley Tesfaye, LPN  March 16, 7731  9:00 AM

## 2018-03-19 ENCOUNTER — PATIENT OUTREACH (OUTPATIENT)
Dept: FAMILY MEDICINE CLINIC | Age: 63
End: 2018-03-19

## 2018-03-19 NOTE — PROGRESS NOTES
NN health screening:    Noted in [facility] is now scheduled for colonoscopy procedure May 30th. Will continue to follow.

## 2018-03-19 NOTE — PROGRESS NOTES
Transitions of Care Coordination    Follow up for hospitalization at [facility] 1/5-1/12/17 for a new diagnosis of diastolic CHF, CKD IV, renal artery stenosis and discharge to home with [facility] home care. Heart Failure Follow Up Call     Bárbara Charles contacted the patient by telephone to perform CHF follow Up. Noted Priorities/Plan: continue to watch diet and slowly loose weight, work in the garden. Daily Weight:     decrease  Weight today 199.6 pounds. Zone: green     Signs/Symptoms: Swelling-none; SOB-none how many pillows to sleep? one    Goals Addressed             Most Recent       Heart Failure     Knowledge and adherence medication (ie. action, side effects, missed dose, etc.)   On track (3/19/2018)             1/15/18-Medications reviewed and organized into pill minder box by pharmacist at [employer] endorsed taking as directed. 2/8/18-Medications reviewed-patient able to describe purpose of medications and understands to take Zaroxolyn every other day       Maintains daily weight. On track (3/19/2018)             Received new scale for HF clinic on 1/15/18  2/8/18-weighing daily-weight today 204  3/19/18-weighing daily-weight today 199.6       Understand CHF action plan. On track (3/19/2018)             1/16/18-Patient able to describe HF zones and action to take  2/8/18 assessed- able to state zone       Understands and adheres to diet. On track (3/19/2018)             1/16/17-Patient able to state diet-diabetic/2GM sodium/1500 cc fluid restriction            Needs addressed from pathway:     Week 9-12    Provide Daily Disease Management  (patient/caregiver initiated)  ? Daily weight (Before Breakfast  ? Daily Zone Identification (symptom management; weight, edema, SOB, activity/sleep changes)-notify provider immediately as indicated  ? Cardiac Low-sodium Diet (No added sodium; 1500mg as indicated). If Diabetic:  include carbohydrate controlled   ?  Fluid restriction (if indicated)  ? Comorbidity Management  ? Confirm follow-up     Additional Assessments  (eg: Vital signs; level of consciousness; dyspnea on exertion; pillow usage; recliner vs bed)  ? Energy conservation management (balance activity w/ rest)  ? Diet/appetite assessment    Psychosocial:  Reassurance and emotional support    Education/Discharge Planning  ? Patient/Caregiver verifies support systems (meals/medication/transportation needs,          Have you returned to an ER/Hospital since discharge from the hospital?   No      Have you followed up with PCP/Cardiologist/Specialist?   Pulmonologist with [facility] 3/13/18. Was told she may have a slight case of pulmonary HTN. No medication changes. Next appointments:  Endocrinology-week of 3/26/18  Dr. Nilay Goff 4/12/18    Transportation:  regular car,  drives  Diet: 2GM NA, diabetic, 1500 cc fluid restriction  Activity/ADLs:  Independent with ADLs, exercises at [facility]  Medications Reconciled at this time:  no new medications  Home health:  Company/Completion: [facility]-service completed  Social Support:  Spouse, family    Advanced Care Plan: Patient completed an ACP while in [facility] and stated it is on the Living Will Registry. Agreeable to   provide a copy at next PCP apt. Reached patient and identified self/role. Ms Quan Dos Santos stated \"I am doing just fine. \" Endorsed taking medications as directed.     Chart reviewed.   Patient scheduled for colonoscopy 5/30/19 per notes. Reviewed diet and blood sugar readings. Patient stated she is no longer having low blood sugar reading as she is having a bedtime snack. Appointment with endocrinology is scheduled for next week.     Ms Quan Dos Santos is agreeable to a CB in about 2 weeks. Patient reminded that there is a physician on call 24 hours a day / 7 days a week (M-F 5pm to 8am and from Friday 5pm until Monday 8a for the weekend) should the patient have questions or concerns.   Patient reminded to call 911 if situation is emergent or patient feels the situation is emergent. Pt verbalizes understanding.

## 2018-04-10 ENCOUNTER — PATIENT OUTREACH (OUTPATIENT)
Dept: FAMILY MEDICINE CLINIC | Age: 63
End: 2018-04-10

## 2018-04-10 RX ORDER — CLOPIDOGREL BISULFATE 75 MG/1
TABLET ORAL
COMMUNITY
End: 2018-05-30

## 2018-04-10 RX ORDER — VALSARTAN 80 MG/1
TABLET ORAL DAILY
COMMUNITY
End: 2018-06-08 | Stop reason: SDUPTHER

## 2018-04-10 RX ORDER — AMLODIPINE BESYLATE 10 MG/1
TABLET ORAL DAILY
COMMUNITY
End: 2018-05-15 | Stop reason: SDUPTHER

## 2018-04-10 RX ORDER — METOLAZONE 10 MG/1
2.5 TABLET ORAL EVERY OTHER DAY
COMMUNITY
End: 2020-06-16

## 2018-04-10 RX ORDER — HYDRALAZINE HYDROCHLORIDE 100 MG/1
TABLET, FILM COATED ORAL 2 TIMES DAILY
COMMUNITY
End: 2020-06-16

## 2018-04-10 RX ORDER — CLONIDINE HYDROCHLORIDE 0.3 MG/1
0.3 TABLET ORAL 2 TIMES DAILY
COMMUNITY
End: 2018-06-08 | Stop reason: SDUPTHER

## 2018-04-10 NOTE — PROGRESS NOTES
Hospital Discharge Follow Up    Follow up for hospitalization at [facility] 1/5-1/12/17 for a new diagnosis of diastolic CHF, CKD IV, renal artery stenosis and discharge to home with [facility] home care. Heart Failure Follow Up Call     Gisella Morrell contacted the patient by telephone to perform CHF follow Up. Ms Lucille Quevedo stated \"I'm doing very well. I feel good. \"  Adherent to cardiac diet and diabetic diet. Medications reconciled and updated. Pleasant patient who is managing well and motivated to continue with lifestyle changes. Very good understanding of self management of heart failure. Noted Priorities/Plan:  Continue to attend appointments, continue low sodium diabetic cardiac diet, exercise, daily weights. Daily Weight: 199.6 pounds no change      Zone: green   Signs/Symptoms: Swelling none; SOB none; how many pillows to sleep-one    Needs addressed from pathway:     Discharge   Complete Discharge Med Rec     Review/Verification:  ? Discharge instructions, education, and HF Zones with Pt/Caregive  ? Confirm follow-up apts arranged with transportation  ? Contact Care Coordinator and PCP of discharge    Pt/Caregiver Goals:  ? Describes resources and support systems   ? Describes and verbalizes understanding of follow-up apt   ? Describes importance of continuing daily weights and changes to report to physician  ? Verbalizes understanding and describes prescribed diet  ? Verbalizes understanding and describes prescribed medications  ? Understands importance of energy conservation and safe progressive mobility in the context of the home environment   ? Understands importance of progressive independence with activities of daily living  ?  Understands and describes signs and symptoms to report to providers         Have you returned to an ER/Hospital since discharge from the hospital?   No      Have you followed up with PCP/Cardiologist/Specialist?   Endocrinology 4/5/18    Transportation: [employer] car,  drives  Diet: 2GM NA, diabetic, 1500 cc fluid restriction  Activity/ADLs:  Independent with ADLs, exercises at [facility]  Medications Reconciled at this time:  no new medications  Home health:  Company/Completion: [facility]-service completed  Social Support:  Spouse, family    Advanced Care Plan: Patient completed an ACP while in [place] and stated it is on the Living Will Registry. [place] to   provide a copy to PCP office. Patient reminded that there is a physician on call 24 hours a day / 7 days a week (M-F 5pm to 8am and from Friday 5pm until Monday 8a for the weekend) should the patient have questions or concerns. Patient reminded to call 911 if situation is emergent or patient feels the situation is emergent. Pt verbalizes understanding. Goals Addressed             Most Recent       Heart Failure     Knowledge and adherence medication (ie. action, side effects, missed dose, etc.)   On track (4/10/2018)             1/15/18-Medications reviewed and organized into pill minder box by pharmacist at [address] [place] [address] endorsed taking as directed. 2/8/18-Medications reviewed-patient able to describe purpose of medications and understands to take Zaroxolyn every other day       Maintains daily weight. On track (4/10/2018)             Received new scale for HF clinic on 1/15/18  2/8/18-weighing daily-weight today 204  3/19/18-weighing daily-weight today 199.6       Understand CHF action plan. On track (4/10/2018)             1/16/18-Patient able to describe HF zones and action to take  2/8/18 assessed- able to state zone       Understands and adheres to diet. On track (4/10/2018)             1/16/17-Patient able to state diet-diabetic/2GM sodium/1500 cc fluid restriction         Post Hospitalization     Supportive resources in place to maintain patient in the community (ie.  Home Health, DME equipment, refer to, medication assistant plan, etc.)   On track (4/10/2018) [address] [facility] [address] [facility] on 1/13/18.   1/24/18-[facility] completed.   Pt not homebound-attending HF Clinic at [facility]

## 2018-05-15 NOTE — TELEPHONE ENCOUNTER
This patient contacted office for the following prescriptions to be filled:    Medication requested :   Requested Prescriptions     Pending Prescriptions Disp Refills    amLODIPine (NORVASC) 10 mg tablet       Sig: Take  by mouth daily.      PCP: Dr. Eugenio Pedro or Print:Hank    Mail order or Local pharmacy [phone]    Scheduled appointment if not seen by current providers in office: LOV 2/12/18, due  For f/u April 2018, tried to call patient back to schedule f/u but no answer and mailbox full

## 2018-05-16 RX ORDER — AMLODIPINE BESYLATE 10 MG/1
10 TABLET ORAL DAILY
Qty: 30 TAB | Refills: 0 | Status: SHIPPED | OUTPATIENT
Start: 2018-05-16 | End: 2018-06-08 | Stop reason: SDUPTHER

## 2018-05-29 ENCOUNTER — ANESTHESIA EVENT (OUTPATIENT)
Dept: ENDOSCOPY | Age: 63
End: 2018-05-29
Payer: MEDICARE

## 2018-05-30 ENCOUNTER — ANESTHESIA (OUTPATIENT)
Dept: ENDOSCOPY | Age: 63
End: 2018-05-30
Payer: MEDICARE

## 2018-05-30 ENCOUNTER — HOSPITAL ENCOUNTER (OUTPATIENT)
Age: 63
Setting detail: OUTPATIENT SURGERY
Discharge: HOME | End: 2018-05-30
Attending: COLON & RECTAL SURGERY | Admitting: COLON & RECTAL SURGERY
Payer: MEDICARE

## 2018-05-30 VITALS
OXYGEN SATURATION: 100 % | RESPIRATION RATE: 15 BRPM | TEMPERATURE: 97.8 F | HEART RATE: 57 BPM | SYSTOLIC BLOOD PRESSURE: 143 MMHG | WEIGHT: 200 LBS | DIASTOLIC BLOOD PRESSURE: 55 MMHG | BODY MASS INDEX: 31.39 KG/M2 | HEIGHT: 67 IN

## 2018-05-30 LAB — GLUCOSE BLD STRIP.AUTO-MCNC: 82 MG/DL (ref 70–110)

## 2018-05-30 PROCEDURE — 77030013992 HC SNR POLYP ENDOSC BSC -B: Performed by: COLON & RECTAL SURGERY

## 2018-05-30 PROCEDURE — 76060000033 HC ANESTHESIA 1 TO 1.5 HR: Performed by: COLON & RECTAL SURGERY

## 2018-05-30 PROCEDURE — 74011000250 HC RX REV CODE- 250

## 2018-05-30 PROCEDURE — 88305 TISSUE EXAM BY PATHOLOGIST: CPT | Performed by: COLON & RECTAL SURGERY

## 2018-05-30 PROCEDURE — 74011250636 HC RX REV CODE- 250/636: Performed by: NURSE ANESTHETIST, CERTIFIED REGISTERED

## 2018-05-30 PROCEDURE — 77030011640 HC PAD GRND REM COVD -A: Performed by: COLON & RECTAL SURGERY

## 2018-05-30 PROCEDURE — 77030009426 HC FCPS BIOP ENDOSC BSC -B: Performed by: COLON & RECTAL SURGERY

## 2018-05-30 PROCEDURE — 82962 GLUCOSE BLOOD TEST: CPT

## 2018-05-30 PROCEDURE — 74011250636 HC RX REV CODE- 250/636

## 2018-05-30 PROCEDURE — 76040000007: Performed by: COLON & RECTAL SURGERY

## 2018-05-30 PROCEDURE — 74011000250 HC RX REV CODE- 250: Performed by: NURSE ANESTHETIST, CERTIFIED REGISTERED

## 2018-05-30 RX ORDER — INSULIN LISPRO 100 [IU]/ML
INJECTION, SOLUTION INTRAVENOUS; SUBCUTANEOUS ONCE
Status: DISCONTINUED | OUTPATIENT
Start: 2018-05-30 | End: 2018-05-30 | Stop reason: HOSPADM

## 2018-05-30 RX ORDER — GLYCOPYRROLATE 0.2 MG/ML
INJECTION INTRAMUSCULAR; INTRAVENOUS AS NEEDED
Status: DISCONTINUED | OUTPATIENT
Start: 2018-05-30 | End: 2018-05-30 | Stop reason: HOSPADM

## 2018-05-30 RX ORDER — SODIUM CHLORIDE, SODIUM LACTATE, POTASSIUM CHLORIDE, CALCIUM CHLORIDE 600; 310; 30; 20 MG/100ML; MG/100ML; MG/100ML; MG/100ML
75 INJECTION, SOLUTION INTRAVENOUS CONTINUOUS
Status: DISCONTINUED | OUTPATIENT
Start: 2018-05-30 | End: 2018-05-30 | Stop reason: HOSPADM

## 2018-05-30 RX ORDER — PROPOFOL 10 MG/ML
INJECTION, EMULSION INTRAVENOUS AS NEEDED
Status: DISCONTINUED | OUTPATIENT
Start: 2018-05-30 | End: 2018-05-30 | Stop reason: HOSPADM

## 2018-05-30 RX ORDER — LIDOCAINE HYDROCHLORIDE 20 MG/ML
INJECTION, SOLUTION EPIDURAL; INFILTRATION; INTRACAUDAL; PERINEURAL AS NEEDED
Status: DISCONTINUED | OUTPATIENT
Start: 2018-05-30 | End: 2018-05-30 | Stop reason: HOSPADM

## 2018-05-30 RX ORDER — SODIUM CHLORIDE 0.9 % (FLUSH) 0.9 %
5-10 SYRINGE (ML) INJECTION AS NEEDED
Status: DISCONTINUED | OUTPATIENT
Start: 2018-05-30 | End: 2018-05-30 | Stop reason: HOSPADM

## 2018-05-30 RX ORDER — SODIUM CHLORIDE 0.9 % (FLUSH) 0.9 %
5-10 SYRINGE (ML) INJECTION EVERY 8 HOURS
Status: DISCONTINUED | OUTPATIENT
Start: 2018-05-30 | End: 2018-05-30 | Stop reason: HOSPADM

## 2018-05-30 RX ORDER — LIDOCAINE HYDROCHLORIDE 10 MG/ML
0.1 INJECTION, SOLUTION EPIDURAL; INFILTRATION; INTRACAUDAL; PERINEURAL AS NEEDED
Status: DISCONTINUED | OUTPATIENT
Start: 2018-05-30 | End: 2018-05-30 | Stop reason: HOSPADM

## 2018-05-30 RX ADMIN — SODIUM CHLORIDE, SODIUM LACTATE, POTASSIUM CHLORIDE, AND CALCIUM CHLORIDE 75 ML/HR: 600; 310; 30; 20 INJECTION, SOLUTION INTRAVENOUS at 08:16

## 2018-05-30 RX ADMIN — PROPOFOL 50 MG: 10 INJECTION, EMULSION INTRAVENOUS at 08:58

## 2018-05-30 RX ADMIN — PROPOFOL 50 MG: 10 INJECTION, EMULSION INTRAVENOUS at 08:48

## 2018-05-30 RX ADMIN — LIDOCAINE HYDROCHLORIDE 40 MG: 20 INJECTION, SOLUTION EPIDURAL; INFILTRATION; INTRACAUDAL; PERINEURAL at 08:41

## 2018-05-30 RX ADMIN — PROPOFOL 50 MG: 10 INJECTION, EMULSION INTRAVENOUS at 08:41

## 2018-05-30 RX ADMIN — GLYCOPYRROLATE 0.2 MG: 0.2 INJECTION INTRAMUSCULAR; INTRAVENOUS at 08:56

## 2018-05-30 RX ADMIN — PROPOFOL 30 MG: 10 INJECTION, EMULSION INTRAVENOUS at 09:02

## 2018-05-30 RX ADMIN — PROPOFOL 50 MG: 10 INJECTION, EMULSION INTRAVENOUS at 08:44

## 2018-05-30 RX ADMIN — FAMOTIDINE 20 MG: 10 INJECTION INTRAVENOUS at 08:16

## 2018-05-30 RX ADMIN — PROPOFOL 50 MG: 10 INJECTION, EMULSION INTRAVENOUS at 08:53

## 2018-05-30 NOTE — ANESTHESIA POSTPROCEDURE EVALUATION
Post-Anesthesia Evaluation and Assessment    Patient: Alee Woodson MRN: [id number]  SSN: [id number]    YOB: 1955  Age: 70 y.o. Sex: female       Cardiovascular Function/Vital Signs  Visit Vitals    /55    Pulse (!) 57    Temp 36.6 °C (97.8 °F)    Resp 15    Ht 5' 7\" (1.702 m)    Wt 90.7 kg (200 lb)    SpO2 100%    Breastfeeding No    BMI 31.32 kg/m2       Patient is status post MAC anesthesia for Procedure(s):  COLONOSCOPY with biopsies. Nausea/Vomiting: None    Postoperative hydration reviewed and adequate. Pain:  Pain Scale 1: Numeric (0 - 10) (05/30/18 1002)  Pain Intensity 1: 0 (05/30/18 1002)   Managed    Neurological Status: At baseline    Mental Status and Level of Consciousness: Arousable    Pulmonary Status:   O2 Device: Room air (05/30/18 1002)   Adequate oxygenation and airway patent    Complications related to anesthesia: None    Post-anesthesia assessment completed.  No concerns    Signed By: Radha Sen MD     May 30, 2018

## 2018-05-30 NOTE — ANESTHESIA PREPROCEDURE EVALUATION
Anesthetic History   No history of anesthetic complications            Review of Systems / Medical History  Patient summary reviewed and pertinent labs reviewed    Pulmonary  Within defined limits                 Neuro/Psych   Within defined limits           Cardiovascular  Within defined limits  Hypertension              Exercise tolerance: >4 METS     GI/Hepatic/Renal  Within defined limits              Endo/Other  Within defined limits  Diabetes: poorly controlled, type 2    Obesity     Other Findings   Comments: Documentation of current medication  Current medications obtained, documented and obtained? YES      Risk Factors for Postoperative nausea/vomiting:       History of postoperative nausea/vomiting? NO       Female? YES       Motion sickness? NO       Intended opioid administration for postoperative analgesia? YES      Smoking Abstinence:  Current Smoker? NO  Elective Surgery? YES  Seen preoperatively by anesthesiologist or proxy prior to day of surgery? YES  Pt abstained from smoking 24 hours prior to anesthesia?  YES    Preventive care/screening for High Blood Pressure:  Aged 18 years and older: YES  Screened for high blood pressure: YES  Patients with high blood pressure referred to primary care provider   for BP management: YES                     Physical Exam    Airway  Mallampati: II  TM Distance: 4 - 6 cm  Neck ROM: normal range of motion   Mouth opening: Normal     Cardiovascular    Rhythm: regular  Rate: normal         Dental    Dentition: Full upper dentures and Full lower dentures     Pulmonary  Breath sounds clear to auscultation               Abdominal  GI exam deferred       Other Findings            Anesthetic Plan    ASA: 3  Anesthesia type: MAC          Induction: Intravenous  Anesthetic plan and risks discussed with: Patient

## 2018-05-30 NOTE — DISCHARGE INSTRUCTIONS
From Dr. Miles Gilbert: FOLLOW UP VISIT Appointment in: Other (Specify) No aspirin or ibuprofen (e.g. Aleve, Motrin, Advil) for 5 days. Repeat colonoscopy in 3 years. Restart Plavix in 2 days. Colonoscopy: What to Expect at [address] [place] [address]  After you have a colonoscopy, you will stay at the clinic for 1 to 2 hours until the medicines wear off. Then you can go home. But you will need to arrange for a ride. Your doctor will tell you when you can eat and do your other usual activities. Your doctor will talk to you about when you will need your next colonoscopy. Your doctor can help you decide how often you need to be checked. This will depend on the results of your test and your risk for colorectal cancer. After the test, you may be bloated or have gas pains. You may need to pass gas. If a biopsy was done or a polyp was removed, you may have streaks of blood in your stool (feces) for a few days. This care sheet gives you a general idea about how long it will take for you to recover. But each person recovers at a different pace. Follow the steps below to get better as quickly as possible. How can you care for yourself at home? Activity  · Rest when you feel tired. · You can do your normal activities when it feels okay to do so. Diet  · Follow your doctor's directions for eating. · Unless your doctor has told you not to, drink plenty of fluids. This helps to replace the fluids that were lost during the colon prep. · Do not drink alcohol. Medicines  · If polyps were removed or a biopsy was done during the test, your doctor may tell you not to take aspirin or other anti-inflammatory medicines for a few days. These include ibuprofen (Advil, Motrin) and naproxen (Aleve). Other instructions  · For your safety, do not drive or operate machinery until the medicine wears off and you can think clearly.  Your doctor may tell you not to drive or operate machinery until the day after your test.  · Do not sign legal documents or make major decisions until the medicine wears off and you can think clearly. The anesthesia can make it hard for you to fully understand what you are agreeing to. Follow-up care is a key part of your treatment and safety. Be sure to make and go to all appointments, and call your doctor if you are having problems. It's also a good idea to know your test results and keep a list of the medicines you take. When should you call for help? Call 911 anytime you think you may need emergency care. For example, call if:  · You passed out (lost consciousness). · You pass maroon or bloody stools. · You have severe belly pain. Call your doctor now or seek immediate medical care if:  · Your stools are black and tarlike. · Your stools have streaks of blood, but you did not have a biopsy or any polyps removed. · You have belly pain, or your belly is swollen and firm. · You vomit. · You have a fever. · You are very dizzy. Watch closely for changes in your health, and be sure to contact your doctor if you have any problems. Where can you learn more? Go to Spout[employer]be  Enter E264 in the search box to learn more about \"Colonoscopy: What to Expect at Home. \"   © 3717-5641 [employer]. Care instructions adapted under license by [employer] (which disclaims liability or warranty for this information). This care instruction is for use with your licensed healthcare professional. If you have questions about a medical condition or this instruction, always ask your healthcare professional. [place] [address] any warranty or liability for your use of this information. Content Version: 75.3.603763; Current as of: November 14, 2014       Colon Polyps: Care Instructions  Your Care Instructions    Colon polyps are growths in the colon or the rectum. The cause of most colon polyps is not known, and most people who get them do not have any problems.  But a certain kind can turn into cancer. For this reason, regular testing for colon polyps is important for people age 48 and older and anyone who has an increased risk for colon cancer. Polyps are usually found through routine colon cancer screening tests. Although most colon polyps are not cancerous, they are usually removed and then tested for cancer. Screening for colon cancer saves lives because the cancer can usually be cured if it is caught early. If you have a polyp that is the type that can turn into cancer, you may need more tests to examine your entire colon. The doctor will remove any other polyps that he or she finds, and you will be tested more often. Follow-up care is a key part of your treatment and safety. Be sure to make and go to all appointments, and call your doctor if you are having problems. It's also a good idea to know your test results and keep a list of the medicines you take. How can you care for yourself at home? Regular exams to look for colon polyps are the best way to prevent polyps from turning into colon cancer. These can include stool tests, sigmoidoscopy, colonoscopy, and CT colonography. Talk with your doctor about a testing schedule that is right for you. To prevent polyps  There is no home treatment that can prevent colon polyps. But these steps may help lower your risk for cancer. · Stay active. Being active can help you get to and stay at a healthy weight. Try to exercise on most days of the week. Walking is a good choice. · Eat well. Choose a variety of vegetables, fruits, legumes (such as peas and beans), fish, poultry, and whole grains. · Do not smoke. If you need help quitting, talk to your doctor about stop-smoking programs and medicines. These can increase your chances of quitting for good. · If you drink alcohol, limit how much you drink. Limit alcohol to 2 drinks a day for men and 1 drink a day for women. When should you call for help?   Call your doctor now or seek immediate medical care if:  ? · You have severe belly pain. ? · Your stools are maroon or very bloody. ? Watch closely for changes in your health, and be sure to contact your doctor if:  ? · You have a fever. ? · You have nausea or vomiting. ? · You have a change in bowel habits (new constipation or diarrhea). ? · Your symptoms get worse or are not improving as expected. Where can you learn more? Go to http://lei-aleah.info/. Enter 95 [id number] in the search box to learn more about \"Colon Polyps: Care Instructions. \"  Current as of: May 12, 2017  Content Version: 11.4  © 3408-5828 [employer]. Care instructions adapted under license by [employer] (which disclaims liability or warranty for this information). If you have questions about a medical condition or this instruction, always ask your healthcare professional. [place] [address] any warranty or liability for your use of this information. DISCHARGE SUMMARY from Nurse     POST-PROCEDURE INSTRUCTIONS:    Call your Physician if you:  ? Observe any excess bleeding. ? Develop a temperature over 100.5o F.  ? Experience abdominal, shoulder or chest pain. ? Notice any signs of decreased circulation or nerve impairment to an extremity such as a change in color, persistent numbness, tingling, coldness or increase in pain. ? Vomit blood or you have nausea and vomiting lasting longer than 4 hours. ? Are unable to take medications. ? Are unable to urinate within 8 hours after discharge following general anesthesia or intravenous sedation. For the next 24 hours after receiving general anesthesia or intravenous sedation, or while taking prescription Narcotics, limit your activities:  ? Do NOT drive a motor vehicle, operate hazard machinery or power tools, or perform tasks that require coordination. The medication you received during your procedure may have some effect on your mental awareness.   ? Do NOT make important personal or business decisions. The medication you received during your procedure may have some effect on your mental awareness. ? Do NOT drink alcoholic beverages. These drinks do not mix well with the medications that have been given to you. ? Upon discharge from the hospital, you must be accompanied by a responsible adult. ? Resume your diet as directed by your physician. ? Resume medications as your physician has prescribed. ? Please give a list of your current medications to your Primary Care Provider. ? Please update this list whenever your medications are discontinued, doses are changed, or new medications (including over-the-counter products) are added. ? Please carry medication information at all times in case of emergency situations. These are general instructions for a healthy lifestyle:    No smoking/ No tobacco products/ Avoid exposure to second hand smoke.  Surgeon General's Warning:  Quitting smoking now greatly reduces serious risk to your health. Obesity, smoking, and a sedentary lifestyle greatly increase your risk for illness.  A healthy diet, regular physical exercise & weight monitoring are important for maintaining a healthy lifestyle   You may be retaining fluid if you have a history of heart failure or if you experience any of the following symptoms:  Weight gain of 3 pounds or more overnight or 5 pounds in a week, increased swelling in our hands or feet or shortness of breath while lying flat in bed. Please call your doctor as soon as you notice any of these symptoms; do not wait until your next office visit. Recognize signs and symptoms of STROKE:  F  -  Face looks uneven  A  -  Arms unable to move or move unevenly  S  -  Speech slurred or non-existent  T  -  Time to call 911 - as soon as signs and symptoms begin - DO NOT go back to bed or wait to see If you get better - TIME IS BRAIN.     Colorectal Screening   Colorectal cancer almost always develops from precancerous polyps (abnormal growths) in the colon or rectum. Screening tests can find precancerous polyps, so that they can be removed before they turn into cancer. Screening tests can also find colorectal cancer early, when treatment works best.  Radha Andrew Speak with your physician about when you should begin screening and how often you should be tested. Additional Information    If you have questions, please call [phone]. Remember, [employer] is NOT to be used for urgent needs. For medical emergencies, dial 911. Educational references and/or instructions provided during this visit included:    Colon Polyps      APPOINTMENTS:    Please make a follow-up appointment with your physician. Discharge information has been reviewed with the patient. The patient verbalized understanding.

## 2018-05-30 NOTE — PERIOP NOTES
Patient left ambulatory using cane and assistance from RN. RN assisted the patient to her vehicle.  is driving. No complaints or distress noted.

## 2018-05-30 NOTE — IP AVS SNAPSHOT
[address] [facility] [address] [address] [address] [id number] [phone] Patient: Alee Woodson MRN: [id number] FPV:[id number] About your hospitalization You were admitted on:  May 30, 2018 You last received care in the:  [facility] You were discharged on:  May 30, 2018 Why you were hospitalized Your primary diagnosis was:  Not on File Follow-up Information Follow up With Details Comments Contact Info Melchor Pinzon MD   1500 [facility] 200 [facility] 
[phone] Daina Mcginnis MD   [address] [facility] [address] [place] [address] 
[phone] Discharge Orders None A check bessie indicates which time of day the medication should be taken. My Medications CONTINUE taking these medications Instructions Each Dose to Equal  
 Morning Noon Evening Bedtime AMARYL 4 mg tablet Generic drug:  glimepiride Your last dose was: Your next dose is: Take  by mouth every morning. amLODIPine 10 mg tablet Commonly known as:  Johnquejulieta Couch Your last dose was: Your next dose is: Take 1 Tab by mouth daily. 10 mg  
    
   
   
   
  
 atorvastatin 80 mg tablet Commonly known as:  LIPITOR Your last dose was: Your next dose is:    
   
   
 80 mg.  
 80 mg  
    
   
   
   
  
 BD INSULIN SYRINGE ULTRA-FINE 1 mL 30 gauge x 1/2\" Syrg Generic drug:  Insulin Syringe-Needle U-100 Your last dose was: Your next dose is:    
   
   
      
   
   
   
  
 BD Single Use Swabs Regular Padm Generic drug:  alcohol swabs Your last dose was: Your next dose is:    
   
   
      
   
   
   
  
 black cohosh 540 mg Cap Your last dose was: Your next dose is: Take 40 mg by mouth daily. 40 mg  
    
   
   
   
  
 CATAPRES 0.3 mg tablet Generic drug:  cloNIDine HCl Your last dose was: Your next dose is: Take 0.3 mg by mouth two (2) times a day. 0.3 mg  
    
   
   
   
  
 DIOVAN 80 mg tablet Generic drug:  valsartan Your last dose was: Your next dose is: Take  by mouth daily. FISH OIL 60- mg Cap Generic drug:  omega 3-dha-epa-fish oil Your last dose was: Your next dose is: Take 500 mg by mouth two (2) times a day. 500 mg  
    
   
   
   
  
 furosemide 40 mg tablet Commonly known as:  LASIX Your last dose was: Your next dose is: Take 0.5 Tabs by mouth two (2) times a day. 20 mg  
    
   
   
   
  
 hydrALAZINE 100 mg tablet Commonly known as:  APRESOLINE Your last dose was: Your next dose is: Take  by mouth two (2) times a day. metOLazone 10 mg tablet Commonly known as:  Bolivar Galley Your last dose was: Your next dose is: Take 2.5 mg by mouth every other day. 2.5 mg  
    
   
   
   
  
 TAB-A-VINCENT tablet Generic drug:  multivitamin Your last dose was: Your next dose is: Take 1 Tab by mouth daily. 1 Tab  
    
   
   
   
  
 TOPROL XL 50 mg XL tablet Generic drug:  metoprolol succinate Your last dose was: Your next dose is: Take  by mouth daily. * TOUJEO SOLOSTAR SC Your last dose was: Your next dose is:    
   
   
 38 Units by SubCUTAneous route. 38 Units * TOUJEO SOLOSTAR U-300 INSULIN 300 unit/mL (1.5 mL) Inpn Generic drug:  insulin glargine Your last dose was: Your next dose is:    
   
   
 36 Units. 36 Units VITAMIN A PO Your last dose was: Your next dose is: Take 1 Tab by mouth daily. 1 Tab VITAMIN D3 1,000 unit Cap Generic drug:  cholecalciferol Your last dose was: Your next dose is: Take  by mouth. ZINC ACETATE PO Your last dose was: Your next dose is: Take  by mouth. * Notice: This list has 2 medication(s) that are the same as other medications prescribed for you. Read the directions carefully, and ask your doctor or other care provider to review them with you. STOP taking these medications PLAVIX 75 mg Tab Generic drug:  clopidogrel Discharge Instructions From Dr. Schaefer Big: FOLLOW UP VISIT Appointment in: Other (Specify) No aspirin or ibuprofen (e.g. Aleve, Motrin, Advil) for 5 days. Repeat colonoscopy in 3 years. Restart Plavix in 2 days. Colonoscopy: What to Expect at [facility] Your Recovery After you have a colonoscopy, you will stay at the clinic for 1 to 2 hours until the medicines wear off. Then you can go home. But you will need to arrange for a ride. Your doctor will tell you when you can eat and do your other usual activities. Your doctor will talk to you about when you will need your next colonoscopy. Your doctor can help you decide how often you need to be checked. This will depend on the results of your test and your risk for colorectal cancer. After the test, you may be bloated or have gas pains. You may need to pass gas. If a biopsy was done or a polyp was removed, you may have streaks of blood in your stool (feces) for a few days. This care sheet gives you a general idea about how long it will take for you to recover. But each person recovers at a different pace. Follow the steps below to get better as quickly as possible. How can you care for yourself at home? Activity · Rest when you feel tired. · You can do your normal activities when it feels okay to do so. Diet · Follow your doctor's directions for eating. · Unless your doctor has told you not to, drink plenty of fluids. This helps to replace the fluids that were lost during the colon prep. · Do not drink alcohol. Medicines · If polyps were removed or a biopsy was done during the test, your doctor may tell you not to take aspirin or other anti-inflammatory medicines for a few days. These include ibuprofen (Advil, Motrin) and naproxen (Aleve). Other instructions · For your safety, do not drive or operate machinery until the medicine wears off and you can think clearly. Your doctor may tell you not to drive or operate machinery until the day after your test. 
· Do not sign legal documents or make major decisions until the medicine wears off and you can think clearly. The anesthesia can make it hard for you to fully understand what you are agreeing to. Follow-up care is a key part of your treatment and safety. Be sure to make and go to all appointments, and call your doctor if you are having problems. It's also a good idea to know your test results and keep a list of the medicines you take. When should you call for help? Call 911 anytime you think you may need emergency care. For example, call if: 
· You passed out (lost consciousness). · You pass maroon or bloody stools. · You have severe belly pain. Call your doctor now or seek immediate medical care if: 
· Your stools are black and tarlike. · Your stools have streaks of blood, but you did not have a biopsy or any polyps removed. · You have belly pain, or your belly is swollen and firm. · You vomit. · You have a fever. · You are very dizzy. Watch closely for changes in your health, and be sure to contact your doctor if you have any problems. Where can you learn more? Go to Ateo.be Enter E264 in the search box to learn more about \"Colonoscopy: What to Expect at Home. \"  
© 3976-5773 Healthwise, Incorporated.  Care instructions adapted under license by [employer] (which disclaims liability or warranty for this information). This care instruction is for use with your licensed healthcare professional. If you have questions about a medical condition or this instruction, always ask your healthcare professional. [address] any warranty or liability for your use of this information. Content Version: 41.1.169465; Current as of: November 14, 2014 Colon Polyps: Care Instructions Your Care Instructions Colon polyps are growths in the colon or the rectum. The cause of most colon polyps is not known, and most people who get them do not have any problems. But a certain kind can turn into cancer. For this reason, regular testing for colon polyps is important for people age 48 and older and anyone who has an increased risk for colon cancer. Polyps are usually found through routine colon cancer screening tests. Although most colon polyps are not cancerous, they are usually removed and then tested for cancer. Screening for colon cancer saves lives because the cancer can usually be cured if it is caught early. If you have a polyp that is the type that can turn into cancer, you may need more tests to examine your entire colon. The doctor will remove any other polyps that he or she finds, and you will be tested more often. Follow-up care is a key part of your treatment and safety. Be sure to make and go to all appointments, and call your doctor if you are having problems. It's also a good idea to know your test results and keep a list of the medicines you take. How can you care for yourself at home? Regular exams to look for colon polyps are the best way to prevent polyps from turning into colon cancer. These can include stool tests, sigmoidoscopy, colonoscopy, and CT colonography. Talk with your doctor about a testing schedule that is right for you. To prevent polyps There is no home treatment that can prevent colon polyps. But these steps may help lower your risk for cancer. · Stay active. Being active can help you get to and stay at a healthy weight. Try to exercise on most days of the week. Walking is a good choice. · Eat well. Choose a variety of vegetables, fruits, legumes (such as peas and beans), fish, poultry, and whole grains. · Do not smoke. If you need help quitting, talk to your doctor about stop-smoking programs and medicines. These can increase your chances of quitting for good. · If you drink alcohol, limit how much you drink. Limit alcohol to 2 drinks a day for men and 1 drink a day for women. When should you call for help? Call your doctor now or seek immediate medical care if: 
? · You have severe belly pain. ? · Your stools are maroon or very bloody. ? Watch closely for changes in your health, and be sure to contact your doctor if: 
? · You have a fever. ? · You have nausea or vomiting. ? · You have a change in bowel habits (new constipation or diarrhea). ? · Your symptoms get worse or are not improving as expected. Where can you learn more? Go to http://lei-aleah.info/. Enter 95 [id number] in the search box to learn more about \"Colon Polyps: Care Instructions. \" Current as of: May 12, 2017 Content Version: 11.4 © 8003-3354 [employer]. Care instructions adapted under license by [employer] (which disclaims liability or warranty for this information). If you have questions about a medical condition or this instruction, always ask your healthcare professional. [place] [address] any warranty or liability for your use of this information. DISCHARGE SUMMARY from Nurse POST-PROCEDURE INSTRUCTIONS: 
 
Call your Physician if you: 
? Observe any excess bleeding. ? Develop a temperature over 100.5o F. 
? Experience abdominal, shoulder or chest pain. ? Notice any signs of decreased circulation or nerve impairment to an extremity such as a change in color, persistent numbness, tingling, coldness or increase in pain. ? Vomit blood or you have nausea and vomiting lasting longer than 4 hours. ? Are unable to take medications. ? Are unable to urinate within 8 hours after discharge following general anesthesia or intravenous sedation. For the next 24 hours after receiving general anesthesia or intravenous sedation, or while taking prescription Narcotics, limit your activities: 
? Do NOT drive a motor vehicle, operate hazard machinery or power tools, or perform tasks that require coordination. The medication you received during your procedure may have some effect on your mental awareness. ? Do NOT make important personal or business decisions. The medication you received during your procedure may have some effect on your mental awareness. ? Do NOT drink alcoholic beverages. These drinks do not mix well with the medications that have been given to you. ? Upon discharge from the hospital, you must be accompanied by a responsible adult. ? Resume your diet as directed by your physician. ? Resume medications as your physician has prescribed. ? Please give a list of your current medications to your Primary Care Provider. ? Please update this list whenever your medications are discontinued, doses are changed, or new medications (including over-the-counter products) are added. ? Please carry medication information at all times in case of emergency situations. These are general instructions for a healthy lifestyle: No smoking/ No tobacco products/ Avoid exposure to second hand smoke. ? Surgeon General's Warning:  Quitting smoking now greatly reduces serious risk to your health. Obesity, smoking, and a sedentary lifestyle greatly increase your risk for illness.  
? A healthy diet, regular physical exercise & weight monitoring are important for maintaining a healthy lifestyle ? You may be retaining fluid if you have a history of heart failure or if you experience any of the following symptoms:  Weight gain of 3 pounds or more overnight or 5 pounds in a week, increased swelling in our hands or feet or shortness of breath while lying flat in bed. Please call your doctor as soon as you notice any of these symptoms; do not wait until your next office visit. Recognize signs and symptoms of STROKE: 
F  -  Face looks uneven A  -  Arms unable to move or move unevenly S  -  Speech slurred or non-existent T  -  Time to call 911 - as soon as signs and symptoms begin - DO NOT go back to bed or wait to see If you get better - TIME IS BRAIN. Colorectal Screening ? Colorectal cancer almost always develops from precancerous polyps (abnormal growths) in the colon or rectum. Screening tests can find precancerous polyps, so that they can be removed before they turn into cancer. Screening tests can also find colorectal cancer early, when treatment works best. 
? Speak with your physician about when you should begin screening and how often you should be tested. Additional Information If you have questions, please call [phone]. Remember, "[employer]"hart is NOT to be used for urgent needs. For medical emergencies, dial 911. Educational references and/or instructions provided during this visit included: 
 
Colon Polyps APPOINTMENTS: 
 
Please make a follow-up appointment with your physician. Discharge information has been reviewed with the patient. The patient verbalized understanding. ACO Transitions of Care Introducing [employer] [address] Shweta Villarreal offers a voluntary care coordination program to provide high quality service and care to [facility] fee-for-service beneficiaries.   
 
Kelly Hernandez was designed to help you enhance your health and well-being through the following services: ? Transitions of Care  support for individuals who are transitioning from one care setting to another (example: Hospital to home). ? Chronic and Complex Care Coordination  support for individuals and caregivers of those with serious or chronic illnesses or with more than one chronic (ongoing) condition and those who take a number of different medications. If you meet specific medical criteria, a [facility] [address] may call you directly to coordinate your care with your primary care physician and your other care providers. For questions about the [facility] programs, please, contact your physicians office. For general questions or additional information about Accountable Care Organizations: 
Please visit www.medicare.gov/acos. html or call 1-800-MEDICARE (7-444.115.9172) TTY users should call 3-195.876.5449. Introducing \[facility]\"" & HEALTH SERVICES! [facility] introduces Evcarco patient portal. Now you can access parts of your medical record, email your doctor's office, and request medication refills online. 1. In your internet browser, go to [email or web address] 2. Click on the First Time User? Click Here link in the Sign In box. You will see the New Member Sign Up page. 3. Enter your Evcarco Access Code exactly as it appears below. You will not need to use this code after youve completed the sign-up process. If you do not sign up before the expiration date, you must request a new code. · Evcarco Access Code: [email or web address] Expires: 8/21/2018 12:51 PM 
 
4. Enter the last four digits of your Social Security Number (xxxx) and Date of Birth (mm/dd/yyyy) as indicated and click Submit. You will be taken to the next sign-up page. 5. Create a Evcarco ID. This will be your Evcarco login ID and cannot be changed, so think of one that is secure and easy to remember. 6. Create a Brainly password. You can change your password at any time. 7. Enter your Password Reset Question and Answer. This can be used at a later time if you forget your password. 8. Enter your e-mail address. You will receive e-mail notification when new information is available in [address] 9. Click Sign Up. You can now view and download portions of your medical record. 10. Click the Download Summary menu link to download a portable copy of your medical information. If you have questions, please visit the Frequently Asked Questions section of the Brainly website. Remember, Brainly is NOT to be used for urgent needs. For medical emergencies, dial 911. Now available from your iPhone and Android! Introducing Jurgen Davis As a Jyl Oka patient, I wanted to make you aware of our electronic visit tool called Jurgen Davis. Bancore A/Syl Oka 24/7 allows you to connect within minutes with a medical provider 24 hours a day, seven days a week via a mobile device or tablet or logging into a secure website from your computer. You can access Jurgen Davis from anywhere in the [place]. A virtual visit might be right for you when you have a simple condition and feel like you just dont want to get out of bed, or cant get away from work for an appointment, when your regular Jyl Oka provider is not available (evenings, weekends or holidays), or when youre out of town and need minor care. Electronic visits cost only $49 and if the Jyl Oka 24/7 provider determines a prescription is needed to treat your condition, one can be electronically transmitted to a nearby pharmacy*. Please take a moment to enroll today if you have not already done so. The enrollment process is free and takes just a few minutes. To enroll, please download the Bancore A/Syl Oka 24/7 jorge to your tablet or phone, or visit [email or web address] to enroll on your computer. And, as an [address] [place] [address] patient with a [employer] account, the results of your visits will be scanned into your electronic medical record and your primary care provider will be able to view the scanned results. We urge you to continue to see your regular Luz Elena Wilson provider for your ongoing medical care. And while your primary care provider may not be the one available when you seek a Jurgen Davis virtual visit, the peace of mind you get from getting a real diagnosis real time can be priceless. For more information on Jurgen Davis, view our Frequently Asked Questions (FAQs) at [email or web address]. Sincerely, 
 
Shannna Pink MD 
Chief Medical Officer [address]arreal *:  certain medications cannot be prescribed via Jurgen Greypriscilla Providers Seen During Your Hospitalization Provider Specialty Primary office phone Marium Stapleton MD Colon and Rectal Surgery [phone] Your Primary Care Physician (PCP) Primary Care Physician Office Phone Office Fax Maria Luz Dorita [phone] You are allergic to the following No active allergies Recent Documentation Height Weight Breastfeeding? BMI OB Status Smoking Status 1.702 m 90.7 kg No 31.32 kg/m2 Menopause Never Smoker Emergency Contacts Name Discharge Info Relation Home Work Mobile Pratik Seaman  Spouse [3] [phone] Patient Belongings The following personal items are in your possession at time of discharge: 
  Dental Appliances: At home, Uppers, Lowers  Visual Aid: Glasses, Sent home Please provide this summary of care documentation to your next provider. Signatures-by signing, you are acknowledging that this After Visit Summary has been reviewed with you and you have received a copy.   
  
 
  
    
    
 Patient Signature: ____________________________________________________________ Date:  ____________________________________________________________  
  
Sigmund [place] Provider Signature:  ____________________________________________________________ Date:  ____________________________________________________________

## 2018-05-30 NOTE — PROCEDURES
[employer] [facility]  [address] [facility], [address] [facility],[address]   [place], [address]  [phone]                    Colonoscopy Procedure Note      Blas Scripture  1955  [id number]                Date of Procedure: 5/30/2018    Preoperative diagnosis: Z12.11,  Colon cancer Screening, [facility] of colorectal cancer, history of polyps    Postoperative diagnosis: colon polyp    :  Max Gupta MD    Assistant(s): Endoscopy Technician-1: Amber Adam  Endoscopy RN-1: Marily Nguyen RN  Endoscopy RN-2: Michelle Hutchison RN    Sedation: MAC    Procedure Details:  Prior to the procedure, a history and physical were performed. The patients medications, allergies and sensitivities were reviewed and all questions were answered. After informed consent was obtained for the procedure, with all risks and benefits of procedure explained. The patient was taken to the endoscopy suite and placed in the left lateral decubitus position. Patient identification and proposed procedure were verified prior to the procedure by the nurse and I. Following sequential administration of sedation as per Anesthesia, a digital rectal exam was performed and was normal.  The Olympus video colonoscope was introduced through the anus and advanced to cecum, which was identified by the ileocecal valve and appendiceal orifice. The quality of preparation was good. The colonoscope was slowly withdrawn and the mucosa examined for any abnormalities. Cecal withdrawl time was greater than six minutes. The patient tolerated the procedure well. Findings/Interventions:   Polyps - #1, 5 mm in size, located in the transverse colon, removed by cold biopsy and sent for pathology, - #2, 7 mm in size, located in the sigmoid, removed by snare cautery and retrieved for pathology, - #3 and 4, 5 mm in size, located in the sigmoid, distal, removed by cold biopsy and sent for pathology.     EBL: none    Recommendations: -Repeat colonoscopy in 3 years. NO aspirin for 5 days.      Discharge Disposition:  Home  Shaila Head MD  5/30/2018  9:33 AM

## 2018-05-30 NOTE — H&P
HPI: Camden Penaloza is a 70 y.o. female presenting with chief complain of need for crc screening, [facility] ROYAL Thomson of [facility]. Past Medical History:   Diagnosis Date    Burning with urination     Charcot's joint disease due to secondary diabetes ([address] 2009    Diabetes ([address]     Diabetic peripheral neuropathy ([address]     Diabetic retinopathy ([address]     H/O transfusion of packed red blood cells 2009    Hypercholesterolemia     Hypertension        Past Surgical History:   Procedure Laterality Date    HX CATARACT REMOVAL Bilateral 2003    HX COLONOSCOPY  2008    hx of polyps    HX ORTHOPAEDIC  2011    LT ANKLE-CHARCOT JOINT, metal renard placed    HX ORTHOPAEDIC      right ankle, metal renard placed    HX RETINAL DETACHMENT REPAIR Bilateral 2003       Family History   Problem Relation Age of Onset    Hypertension Mother     Stroke Mother      TIA    Arthritis-osteo Mother     Cancer Mother      cytoblastoma    Hypertension Father     Diabetes Father     Heart Disease Father     Cancer Sister      colon    Diabetes Sister        Social History     Social History    Marital status:      Spouse name: N/A    Number of children: N/A    Years of education: N/A     Social History Main Topics    Smoking status: Never Smoker    Smokeless tobacco: Never Used    Alcohol use Yes      Comment: rare    Drug use: No    Sexual activity: Yes     Partners: Male     Birth control/ protection: None     Other Topics Concern    None     Social History Narrative       Review of Systems - negative    Outpatient Prescriptions Marked as Taking for the 5/30/18 encounter [facility] Encounter)   Medication Sig Dispense Refill    amLODIPine (NORVASC) 10 mg tablet Take 1 Tab by mouth daily. 30 Tab 0    metOLazone (ZAROXOLYN) 10 mg tablet Take 2.5 mg by mouth every other day.  hydrALAZINE (APRESOLINE) 100 mg tablet Take  by mouth two (2) times a day.       cloNIDine HCl (CATAPRES) 0.3 mg tablet Take 0.3 mg by mouth two (2) times a day.  clopidogrel (PLAVIX) 75 mg tab Take  by mouth.  valsartan (DIOVAN) 80 mg tablet Take  by mouth daily.  furosemide (LASIX) 40 mg tablet Take 0.5 Tabs by mouth two (2) times a day. 90 Tab 0    atorvastatin (LIPITOR) 80 mg tablet 80 mg.      insulin glargine (TOUJEO SOLOSTAR) 300 unit/mL (1.5 mL) inpn 36 Units.  omega 3-dha-epa-fish oil (FISH OIL) 60- mg cap Take 500 mg by mouth two (2) times a day.  multivitamin (TAB-A-VINCENT) tablet Take 1 Tab by mouth daily.  VITAMIN A PO Take 1 Tab by mouth daily.  black cohosh 540 mg cap Take 40 mg by mouth daily.  INSULIN GLARGINE,HUM. REC. ANLOG (TOUJEO SOLOSTAR SC) 38 Units by SubCUTAneous route.  metoprolol succinate (TOPROL XL) 50 mg XL tablet Take  by mouth daily.  glimepiride (AMARYL) 4 mg tablet Take  by mouth every morning.  Cholecalciferol, Vitamin D3, (VITAMIN D3) 1,000 unit cap Take  by mouth.  ZINC ACETATE PO Take  by mouth.  BD SINGLE USE SWABS REGULAR padm       BD INSULIN SYRINGE ULTRA-FINE 1 mL 30 x 1/2\" syrg          No Known Allergies    Vitals:    05/23/18 1114 05/30/18 0806   BP:  184/54   Pulse:  (!) 55   Resp:  19   Temp:  97.8 °F (36.6 °C)   SpO2:  100%   Weight: 90.7 kg (200 lb)    Height: 5' 7\" (1.702 m)        Physical Exam   Constitutional: She appears well-developed and well-nourished. HENT:   Head: Normocephalic and atraumatic. Eyes: Conjunctivae and EOM are normal.   Abdominal: Soft. She exhibits no distension. There is no tenderness. Musculoskeletal: Normal range of motion. Lymphadenopathy:     She has no cervical adenopathy. Right: No inguinal adenopathy present. Left: No inguinal adenopathy present. Neurological: She exhibits normal muscle tone. Skin: Skin is warm and dry. No rash noted. Psychiatric: She has a normal mood and affect.  Her speech is normal.       Assessment / Plan    colonoscopy    The diagnoses and plan were discussed with the patient. All questions answered. Plan of care agreed to by all concerned.

## 2018-05-31 ENCOUNTER — PATIENT OUTREACH (OUTPATIENT)
Dept: FAMILY MEDICINE CLINIC | Age: 63
End: 2018-05-31

## 2018-05-31 NOTE — PROGRESS NOTES
NN health screening:    Noted completed colonoscopy yesterday with repeat due in 3 yrs. Will continue to follow until cervical and 6 month f/u mammogram is completed.

## 2018-06-08 ENCOUNTER — OFFICE VISIT (OUTPATIENT)
Dept: FAMILY MEDICINE CLINIC | Age: 63
End: 2018-06-08

## 2018-06-08 VITALS
WEIGHT: 205 LBS | OXYGEN SATURATION: 98 % | SYSTOLIC BLOOD PRESSURE: 148 MMHG | DIASTOLIC BLOOD PRESSURE: 92 MMHG | HEART RATE: 79 BPM | BODY MASS INDEX: 32.18 KG/M2 | HEIGHT: 67 IN | RESPIRATION RATE: 15 BRPM

## 2018-06-08 DIAGNOSIS — E78.49 OTHER HYPERLIPIDEMIA: ICD-10-CM

## 2018-06-08 DIAGNOSIS — N63.20 MASS OF LEFT BREAST ON MAMMOGRAM: ICD-10-CM

## 2018-06-08 DIAGNOSIS — I10 ESSENTIAL HYPERTENSION: Primary | ICD-10-CM

## 2018-06-08 PROBLEM — Z79.4 CONTROLLED TYPE 2 DIABETES MELLITUS WITHOUT COMPLICATION, WITH LONG-TERM CURRENT USE OF INSULIN (HCC): Status: ACTIVE | Noted: 2018-06-08

## 2018-06-08 PROBLEM — E11.21 TYPE 2 DIABETES WITH NEPHROPATHY (HCC): Status: ACTIVE | Noted: 2018-06-08

## 2018-06-08 PROBLEM — E11.9 CONTROLLED TYPE 2 DIABETES MELLITUS WITHOUT COMPLICATION, WITH LONG-TERM CURRENT USE OF INSULIN (HCC): Status: ACTIVE | Noted: 2018-06-08

## 2018-06-08 RX ORDER — VALSARTAN 80 MG/1
80 TABLET ORAL DAILY
Qty: 30 TAB | Refills: 5 | Status: SHIPPED | OUTPATIENT
Start: 2018-06-08 | End: 2019-02-20 | Stop reason: SDUPTHER

## 2018-06-08 RX ORDER — ISOSORBIDE MONONITRATE 30 MG/1
TABLET, EXTENDED RELEASE ORAL
Refills: 1 | COMMUNITY
Start: 2018-04-04 | End: 2020-06-16 | Stop reason: SDUPTHER

## 2018-06-08 RX ORDER — CLONIDINE HYDROCHLORIDE 0.3 MG/1
0.3 TABLET ORAL 2 TIMES DAILY
Qty: 60 TAB | Refills: 2 | Status: SHIPPED | OUTPATIENT
Start: 2018-06-08 | End: 2018-09-14 | Stop reason: SDUPTHER

## 2018-06-08 RX ORDER — CLOPIDOGREL BISULFATE 75 MG/1
75 TABLET ORAL DAILY
Qty: 30 TAB | Refills: 2 | Status: SHIPPED | OUTPATIENT
Start: 2018-06-08 | End: 2018-09-14 | Stop reason: SDUPTHER

## 2018-06-08 RX ORDER — FUROSEMIDE 40 MG/1
20 TABLET ORAL 2 TIMES DAILY
Qty: 90 TAB | Refills: 0 | Status: SHIPPED | OUTPATIENT
Start: 2018-06-08 | End: 2018-09-05 | Stop reason: SDUPTHER

## 2018-06-08 RX ORDER — AMLODIPINE BESYLATE 10 MG/1
10 TABLET ORAL DAILY
Qty: 30 TAB | Refills: 0 | Status: SHIPPED | OUTPATIENT
Start: 2018-06-08 | End: 2020-06-16 | Stop reason: SDUPTHER

## 2018-06-08 RX ORDER — CLOPIDOGREL BISULFATE 75 MG/1
75 TABLET ORAL
COMMUNITY
End: 2018-06-08 | Stop reason: SDUPTHER

## 2018-06-08 NOTE — PROGRESS NOTES
Chief Complaint   Patient presents with    Hypertension     pt in office f/u    Cholesterol Problem    CHF     1. Have you been to the ER, urgent care clinic since your last visit? Hospitalized since your last visit? No    2. Have you seen or consulted any other health care providers outside of the [address] [place] [address] since your last visit? Include any pap smears or colon screening.  No

## 2018-06-08 NOTE — MR AVS SNAPSHOT
[facility] [address] [place] [address] 
[phone] Patient: José Miguel Bermudez MRN: [id number] Visit Information Date & Time Provider Department Dept. Phone Encounter #  
 6/8/2018  4:00 PM Ashwini Saunders MD [facility] [phone] [id number] Follow-up Instructions Return in about 3 months (around 9/8/2018). Upcoming Health Maintenance Date Due Hepatitis C Screening 1955 FOOT EXAM Q1 2/9/1963 ZOSTER VACCINE AGE 60> 12/9/2012 Bone Densitometry (Dexa) Screening 2/9/2018 Pneumococcal 65+ Low/Medium Risk (1 of 2 - PCV13) 2/9/2018 Influenza Age 5 to Adult 8/1/2018 HEMOGLOBIN A1C Q6M 8/15/2018 MICROALBUMIN Q1 10/27/2018 MEDICARE YEARLY EXAM 10/28/2018 EYE EXAM RETINAL OR DILATED Q1 12/14/2018 LIPID PANEL Q1 4/16/2019 BREAST CANCER SCRN MAMMOGRAM 10/31/2019 GLAUCOMA SCREENING Q2Y 12/14/2019 COLONOSCOPY 5/30/2021 DTaP/Tdap/Td series (2 - Td) 1/19/2028 Allergies as of 6/8/2018  Review Complete On: 6/8/2018 By: Ashwini Saunders MD  
 No Known Allergies Current Immunizations  Never Reviewed Name Date Influenza Vaccine 10/1/2017 12:00 AM  
 Influenza Vaccine (Quad) PF 10/27/2017 Not reviewed this visit You Were Diagnosed With   
  
 Codes Comments Essential hypertension    -  Primary ICD-10-CM: I10 
ICD-9-CM: 401.9 Other hyperlipidemia     ICD-10-CM: E78.4 ICD-9-CM: 272.4 Mass of left breast on mammogram     ICD-10-CM: N63.20 ICD-9-CM: 611.72 Vitals BP Pulse Resp Height(growth percentile) Weight(growth percentile) SpO2  
 (!) 148/92 79 15 5' 7\" (1.702 m) 205 lb (93 kg) 98% BMI OB Status Smoking Status 32.11 kg/m2 Menopause Never Smoker BMI and BSA Data Body Mass Index Body Surface Area  
 32.11 kg/m 2 2.1 m 2 Preferred Pharmacy Pharmacy Name Phone [id number] - [place], [address] [place] [address] [phone] Your Updated Medication List  
  
   
This list is accurate as of 6/8/18  4:09 PM.  Always use your most recent med list.  
  
  
  
  
 AMARYL 4 mg tablet Generic drug:  glimepiride Take  by mouth every morning. amLODIPine 10 mg tablet Commonly known as:  Crispin Raymond Take 1 Tab by mouth daily. atorvastatin 80 mg tablet Commonly known as:  LIPITOR 80 mg.  
  
 BD INSULIN SYRINGE ULTRA-FINE 1 mL 30 gauge x 1/2\" Syrg Generic drug:  Insulin Syringe-Needle U-100 BD Single Use Swabs Regular Padm Generic drug:  alcohol swabs  
  
 black cohosh 540 mg Cap Take 40 mg by mouth daily. cloNIDine HCl 0.3 mg tablet Commonly known as:  CATAPRES Take 1 Tab by mouth two (2) times a day. clopidogrel 75 mg Tab Commonly known as:  PLAVIX Take 1 Tab by mouth daily. FISH OIL 60- mg Cap Generic drug:  omega 3-dha-epa-fish oil Take 500 mg by mouth two (2) times a day. furosemide 40 mg tablet Commonly known as:  LASIX Take 0.5 Tabs by mouth two (2) times a day. hydrALAZINE 100 mg tablet Commonly known as:  APRESOLINE Take  by mouth two (2) times a day. isosorbide mononitrate ER 30 mg tablet Commonly known as:  IMDUR  
  
 metOLazone 10 mg tablet Commonly known as:  Ayde [place] Take 2.5 mg by mouth every other day. TAB-A-VINCENT tablet Generic drug:  multivitamin Take 1 Tab by mouth daily. TOPROL XL 50 mg XL tablet Generic drug:  metoprolol succinate Take  by mouth daily. * TOUJEO SOLOSTAR SC  
38 Units by SubCUTAneous route. * TOUJEO SOLOSTAR U-300 INSULIN 300 unit/mL (1.5 mL) Inpn Generic drug:  insulin glargine 36 Units. valsartan 80 mg tablet Commonly known as:  DIOVAN Take 1 Tab by mouth daily. VITAMIN A PO Take 1 Tab by mouth daily. VITAMIN D3 1,000 unit Cap Generic drug:  cholecalciferol Take  by mouth. ZINC ACETATE PO Take  by mouth. * Notice: This list has 2 medication(s) that are the same as other medications prescribed for you. Read the directions carefully, and ask your doctor or other care provider to review them with you. Prescriptions Sent to Pharmacy Refills  
 amLODIPine (NORVASC) 10 mg tablet 0 Sig: Take 1 Tab by mouth daily. Class: Normal  
 Pharmacy: [address] Ph #: [phone] Route: Oral  
 valsartan (DIOVAN) 80 mg tablet 5 Sig: Take 1 Tab by mouth daily. Class: Normal  
 Pharmacy: [address] Ph #: [phone] Route: Oral  
 furosemide (LASIX) 40 mg tablet 0 Sig: Take 0.5 Tabs by mouth two (2) times a day. Class: Normal  
 Pharmacy: [address] Ph #: [phone] Route: Oral  
 cloNIDine HCl (CATAPRES) 0.3 mg tablet 2 Sig: Take 1 Tab by mouth two (2) times a day. Class: Normal  
 Pharmacy: [address] Ph #: [phone] Route: Oral  
 clopidogrel (PLAVIX) 75 mg tab 2 Sig: Take 1 Tab by mouth daily. Class: Normal  
 Pharmacy: [address] Ph #: [phone] Route: Oral  
  
Follow-up Instructions Return in about 3 months (around 9/8/2018). To-Do List   
 06/08/2018 Imaging:  US BREAST LT LIMITED=<3 QUAD Patient Instructions High Blood Pressure: Care Instructions Your Care Instructions If your blood pressure is usually above 140/90, you have high blood pressure, or hypertension. That means the top number is 140 or higher or the bottom number is 90 or higher, or both. Despite what a lot of people think, high blood pressure usually doesn't cause headaches or make you feel dizzy or lightheaded.  It usually has no symptoms. But it does increase your risk for heart attack, stroke, and kidney or eye damage. The higher your blood pressure, the more your risk increases. Your doctor will give you a goal for your blood pressure. Your goal will be based on your health and your age. An example of a goal is to keep your blood pressure below 140/90. Lifestyle changes, such as eating healthy and being active, are always important to help lower blood pressure. You might also take medicine to reach your blood pressure goal. 
Follow-up care is a key part of your treatment and safety. Be sure to make and go to all appointments, and call your doctor if you are having problems. It's also a good idea to know your test results and keep a list of the medicines you take. How can you care for yourself at home? Medical treatment · If you stop taking your medicine, your blood pressure will go back up. You may take one or more types of medicine to lower your blood pressure. Be safe with medicines. Take your medicine exactly as prescribed. Call your doctor if you think you are having a problem with your medicine. · Talk to your doctor before you start taking aspirin every day. Aspirin can help certain people lower their risk of a heart attack or stroke. But taking aspirin isn't right for everyone, because it can cause serious bleeding. · See your doctor regularly. You may need to see the doctor more often at first or until your blood pressure comes down. · If you are taking blood pressure medicine, talk to your doctor before you take decongestants or anti-inflammatory medicine, such as ibuprofen. Some of these medicines can raise blood pressure. · Learn how to check your blood pressure at home. Lifestyle changes · Stay at a healthy weight. This is especially important if you put on weight around the waist. Losing even 10 pounds can help you lower your blood pressure. · If your doctor recommends it, get more exercise.  Walking is a good choice. Bit by bit, increase the amount you walk every day. Try for at least 30 minutes on most days of the week. You also may want to swim, bike, or do other activities. · Avoid or limit alcohol. Talk to your doctor about whether you can drink any alcohol. · Try to limit how much sodium you eat to less than 2,300 milligrams (mg) a day. Your doctor may ask you to try to eat less than 1,500 mg a day. · Eat plenty of fruits (such as bananas and oranges), vegetables, legumes, whole grains, and low-fat dairy products. · Lower the amount of saturated fat in your diet. Saturated fat is found in animal products such as milk, cheese, and meat. Limiting these foods may help you lose weight and also lower your risk for heart disease. · Do not smoke. Smoking increases your risk for heart attack and stroke. If you need help quitting, talk to your doctor about stop-smoking programs and medicines. These can increase your chances of quitting for good. When should you call for help? Call 911 anytime you think you may need emergency care. This may mean having symptoms that suggest that your blood pressure is causing a serious heart or blood vessel problem. Your blood pressure may be over 180/110. ? For example, call 911 if: 
? · You have symptoms of a heart attack. These may include: ¨ Chest pain or pressure, or a strange feeling in the chest. 
¨ Sweating. ¨ Shortness of breath. ¨ Nausea or vomiting. ¨ Pain, pressure, or a strange feeling in the back, neck, jaw, or upper belly or in one or both shoulders or arms. ¨ Lightheadedness or sudden weakness. ¨ A fast or irregular heartbeat. ? · You have symptoms of a stroke. These may include: 
¨ Sudden numbness, tingling, weakness, or loss of movement in your face, arm, or leg, especially on only one side of your body. ¨ Sudden vision changes. ¨ Sudden trouble speaking. ¨ Sudden confusion or trouble understanding simple statements. ¨ Sudden problems with walking or balance. ¨ A sudden, severe headache that is different from past headaches. ? · You have severe back or belly pain. ?Do not wait until your blood pressure comes down on its own. Get help right away. ?Call your doctor now or seek immediate care if: 
? · Your blood pressure is much higher than normal (such as 180/110 or higher), but you don't have symptoms. ? · You think high blood pressure is causing symptoms, such as: ¨ Severe headache. ¨ Blurry vision. ? Watch closely for changes in your health, and be sure to contact your doctor if: 
? · Your blood pressure measures 140/90 or higher at least 2 times. That means the top number is 140 or higher or the bottom number is 90 or higher, or both. ? · You think you may be having side effects from your blood pressure medicine. ? · Your blood pressure is usually normal, but it goes above normal at least 2 times. Where can you learn more? Go to http://lei-aleah.info/. Enter C610 in the search box to learn more about \"High Blood Pressure: Care Instructions. \" Current as of: September 21, 2016 Content Version: 11.4 © 1414-7649 [employer]. Care instructions adapted under license by [employer] (which disclaims liability or warranty for this information). If you have questions about a medical condition or this instruction, always ask your healthcare professional. [place] [address] any warranty or liability for your use of this information. Introducing [facility]! [employer] introduces Urlist patient portal. Now you can access parts of your medical record, email your doctor's office, and request medication refills online. 1. In your internet browser, go to [email or web address] 2. Click on the First Time User? Click Here link in the Sign In box. You will see the New Member Sign Up page. 3. Enter your [employer] Access Code exactly as it appears below. You will not need to use this code after youve completed the sign-up process. If you do not sign up before the expiration date, you must request a new code. · [employer] Access Code: [email or web address] Expires: 8/21/2018 12:51 PM 
 
4. Enter the last four digits of your Social Security Number (xxxx) and Date of Birth (mm/dd/yyyy) as indicated and click Submit. You will be taken to the next sign-up page. 5. Create a [employer] ID. This will be your [employer] login ID and cannot be changed, so think of one that is secure and easy to remember. 6. Create a [employer] password. You can change your password at any time. 7. Enter your Password Reset Question and Answer. This can be used at a later time if you forget your password. 8. Enter your e-mail address. You will receive e-mail notification when new information is available in [address] 9. Click Sign Up. You can now view and download portions of your medical record. 10. Click the Download Summary menu link to download a portable copy of your medical information. If you have questions, please visit the Frequently Asked Questions section of the [employer] website. Remember, [employer] is NOT to be used for urgent needs. For medical emergencies, dial 911. Now available from your iPhone and Android! Please provide this summary of care documentation to your next provider. Your primary care clinician is listed as [address]. If you have any questions after today's visit, please call [phone].

## 2018-06-08 NOTE — PROGRESS NOTES
Maria D Alejandra is a 70 y.o. female  presents for follow up. No new complaints. No Known Allergies  Outpatient Prescriptions Marked as Taking for the 6/8/18 encounter (Office Visit) with Dinorah Toney MD   Medication Sig Dispense Refill    amLODIPine (NORVASC) 10 mg tablet Take 1 Tab by mouth daily. 30 Tab 0    valsartan (DIOVAN) 80 mg tablet Take 1 Tab by mouth daily. 30 Tab 5    furosemide (LASIX) 40 mg tablet Take 0.5 Tabs by mouth two (2) times a day. 90 Tab 0    cloNIDine HCl (CATAPRES) 0.3 mg tablet Take 1 Tab by mouth two (2) times a day. 60 Tab 2    clopidogrel (PLAVIX) 75 mg tab Take 1 Tab by mouth daily. 30 Tab 2    metOLazone (ZAROXOLYN) 10 mg tablet Take 2.5 mg by mouth every other day.  hydrALAZINE (APRESOLINE) 100 mg tablet Take  by mouth two (2) times a day.  atorvastatin (LIPITOR) 80 mg tablet 80 mg.      insulin glargine (TOUJEO SOLOSTAR) 300 unit/mL (1.5 mL) inpn 36 Units.  omega 3-dha-epa-fish oil (FISH OIL) 60- mg cap Take 500 mg by mouth two (2) times a day.  multivitamin (TAB-A-VINCENT) tablet Take 1 Tab by mouth daily.  VITAMIN A PO Take 1 Tab by mouth daily.  black cohosh 540 mg cap Take 40 mg by mouth daily.  INSULIN GLARGINE,HUM. REC. ANLOG (TOUJEO SOLOSTAR SC) 38 Units by SubCUTAneous route.  metoprolol succinate (TOPROL XL) 50 mg XL tablet Take  by mouth daily.  glimepiride (AMARYL) 4 mg tablet Take  by mouth every morning.  Cholecalciferol, Vitamin D3, (VITAMIN D3) 1,000 unit cap Take  by mouth.  ZINC ACETATE PO Take  by mouth.       BD SINGLE USE SWABS REGULAR padm       BD INSULIN SYRINGE ULTRA-FINE 1 mL 30 x 1/2\" syrg        Patient Active Problem List   Diagnosis Code    Hypertension I10    Type 2 diabetes mellitus ([facility] E11.9    Hyperlipidemia E78.5    Diabetic retinopathy associated with type 2 diabetes mellitus ([facility] E11.319    Diabetic peripheral neuropathy associated with type 2 diabetes mellitus ([facility] E11.42    Charcot's joint arthropathy in type 2 diabetes mellitus ([facility] E11.610    Obesity E66.9    Mass of left breast on mammogram N63.20    ACP (advance care planning) Z71.89    Obesity, morbid ([facility] E66.01    Congestive heart failure ([facility] I50.9    Controlled type 2 diabetes mellitus without complication, with long-term current use of insulin ([facility]) E11.9, Z79.4     Past Medical History:   Diagnosis Date    Burning with urination     Charcot's joint disease due to secondary diabetes ([facility] 2009    Diabetes ([facility]     Diabetic peripheral neuropathy ([facility]     Diabetic retinopathy ([facility]     H/O transfusion of packed red blood cells 2009    Hypercholesterolemia     Hypertension      Social History     Social History    Marital status:      Spouse name: N/A    Number of children: N/A    Years of education: N/A     Social History Main Topics    Smoking status: Never Smoker    Smokeless tobacco: Never Used    Alcohol use Yes      Comment: rare    Drug use: No    Sexual activity: Yes     Partners: Male     Birth control/ protection: None     Other Topics Concern    Not on file     Social History Narrative     Family History   Problem Relation Age of Onset    Hypertension Mother     Stroke Mother      TIA    Arthritis-osteo Mother     Cancer Mother      cytoblastoma    Hypertension Father     Diabetes Father     Heart Disease Father     Cancer Sister      colon    Diabetes Sister         Review of Systems   Constitutional: Negative for chills and fever. Cardiovascular: Negative for chest pain and palpitations. Gastrointestinal: Negative for constipation, diarrhea, nausea and vomiting. Psychiatric/Behavioral: Negative.         Vitals:    06/08/18 1552   BP: (!) 148/92   Pulse: 79   Resp: 15   SpO2: 98%   Weight: 205 lb (93 kg)   Height: 5' 7\" (1.702 m)   PainSc:   0 - No pain       Physical Exam   Constitutional: She is oriented to person, place, and time and well-developed, well-nourished, and in no distress. Eyes: Conjunctivae are normal. Pupils are equal, round, and reactive to light. Cardiovascular: Normal rate and regular rhythm. Murmur heard. Musculoskeletal: Normal range of motion. Neurological: She is alert and oriented to person, place, and time. Skin: Skin is warm and dry. Psychiatric: Affect and judgment normal.   Nursing note and vitals reviewed. Assessment/Plan      ICD-10-CM ICD-9-CM    1. Essential hypertension I10 401.9 amLODIPine (NORVASC) 10 mg tablet      valsartan (DIOVAN) 80 mg tablet      furosemide (LASIX) 40 mg tablet      cloNIDine HCl (CATAPRES) 0.3 mg tablet      clopidogrel (PLAVIX) 75 mg tab   2. Other hyperlipidemia E78.4 272.4    3. Mass of left breast on mammogram N63.20 611.72 US BREAST LT LIMITED=<3 QUAD     I have discussed the diagnosis with the patient and the intended plan of care as seen in the above orders. The patient has received an after-visit summary and questions were answered concerning future plans. I have discussed medication, side effects, and warnings with the patient in detail. The patient verbalized understanding and is in agreement with the plan of care. The patient will contact the office with any additional concerns. Follow-up Disposition:  Return in about 3 months (around 9/8/2018).   lab results and schedule of future lab studies reviewed with patient    William Moody MD

## 2018-06-08 NOTE — PATIENT INSTRUCTIONS
High Blood Pressure: Care Instructions  Your Care Instructions    If your blood pressure is usually above 140/90, you have high blood pressure, or hypertension. That means the top number is 140 or higher or the bottom number is 90 or higher, or both. Despite what a lot of people think, high blood pressure usually doesn't cause headaches or make you feel dizzy or lightheaded. It usually has no symptoms. But it does increase your risk for heart attack, stroke, and kidney or eye damage. The higher your blood pressure, the more your risk increases. Your doctor will give you a goal for your blood pressure. Your goal will be based on your health and your age. An example of a goal is to keep your blood pressure below 140/90. Lifestyle changes, such as eating healthy and being active, are always important to help lower blood pressure. You might also take medicine to reach your blood pressure goal.  Follow-up care is a key part of your treatment and safety. Be sure to make and go to all appointments, and call your doctor if you are having problems. It's also a good idea to know your test results and keep a list of the medicines you take. How can you care for yourself at home? Medical treatment  · If you stop taking your medicine, your blood pressure will go back up. You may take one or more types of medicine to lower your blood pressure. Be safe with medicines. Take your medicine exactly as prescribed. Call your doctor if you think you are having a problem with your medicine. · Talk to your doctor before you start taking aspirin every day. Aspirin can help certain people lower their risk of a heart attack or stroke. But taking aspirin isn't right for everyone, because it can cause serious bleeding. · See your doctor regularly. You may need to see the doctor more often at first or until your blood pressure comes down.   · If you are taking blood pressure medicine, talk to your doctor before you take decongestants or anti-inflammatory medicine, such as ibuprofen. Some of these medicines can raise blood pressure. · Learn how to check your blood pressure at home. Lifestyle changes  · Stay at a healthy weight. This is especially important if you put on weight around the waist. Losing even 10 pounds can help you lower your blood pressure. · If your doctor recommends it, get more exercise. Walking is a good choice. Bit by bit, increase the amount you walk every day. Try for at least 30 minutes on most days of the week. You also may want to swim, bike, or do other activities. · Avoid or limit alcohol. Talk to your doctor about whether you can drink any alcohol. · Try to limit how much sodium you eat to less than 2,300 milligrams (mg) a day. Your doctor may ask you to try to eat less than 1,500 mg a day. · Eat plenty of fruits (such as bananas and oranges), vegetables, legumes, whole grains, and low-fat dairy products. · Lower the amount of saturated fat in your diet. Saturated fat is found in animal products such as milk, cheese, and meat. Limiting these foods may help you lose weight and also lower your risk for heart disease. · Do not smoke. Smoking increases your risk for heart attack and stroke. If you need help quitting, talk to your doctor about stop-smoking programs and medicines. These can increase your chances of quitting for good. When should you call for help? Call 911 anytime you think you may need emergency care. This may mean having symptoms that suggest that your blood pressure is causing a serious heart or blood vessel problem. Your blood pressure may be over 180/110. ? For example, call 911 if:  ? · You have symptoms of a heart attack. These may include:  ¨ Chest pain or pressure, or a strange feeling in the chest.  ¨ Sweating. ¨ Shortness of breath. ¨ Nausea or vomiting.   ¨ Pain, pressure, or a strange feeling in the back, neck, jaw, or upper belly or in one or both shoulders or arms.  ¨ Lightheadedness or sudden weakness. ¨ A fast or irregular heartbeat. ? · You have symptoms of a stroke. These may include:  ¨ Sudden numbness, tingling, weakness, or loss of movement in your face, arm, or leg, especially on only one side of your body. ¨ Sudden vision changes. ¨ Sudden trouble speaking. ¨ Sudden confusion or trouble understanding simple statements. ¨ Sudden problems with walking or balance. ¨ A sudden, severe headache that is different from past headaches. ? · You have severe back or belly pain. ?Do not wait until your blood pressure comes down on its own. Get help right away. ?Call your doctor now or seek immediate care if:  ? · Your blood pressure is much higher than normal (such as 180/110 or higher), but you don't have symptoms. ? · You think high blood pressure is causing symptoms, such as:  ¨ Severe headache. ¨ Blurry vision. ? Watch closely for changes in your health, and be sure to contact your doctor if:  ? · Your blood pressure measures 140/90 or higher at least 2 times. That means the top number is 140 or higher or the bottom number is 90 or higher, or both. ? · You think you may be having side effects from your blood pressure medicine. ? · Your blood pressure is usually normal, but it goes above normal at least 2 times. Where can you learn more? Go to http://lei-aleah.info/. Enter P474 in the search box to learn more about \"High Blood Pressure: Care Instructions. \"  Current as of: September 21, 2016  Content Version: 11.4  © 8202-7667 [email or web address]. Care instructions adapted under license by [employer] (which disclaims liability or warranty for this information). If you have questions about a medical condition or this instruction, always ask your healthcare professional. [place] [address] any warranty or liability for your use of this information.

## 2018-06-15 ENCOUNTER — TELEPHONE (OUTPATIENT)
Dept: SURGERY | Age: 63
End: 2018-06-15

## 2018-06-15 NOTE — TELEPHONE ENCOUNTER
----- Message from Joselo Moise MD sent at 6/1/2018  1:28 PM EDT -----  Benign polyp(s). Change callback to 5 years since hyperplastic. Attempted to call patient x 2. Left message.

## 2018-07-12 ENCOUNTER — PATIENT OUTREACH (OUTPATIENT)
Dept: FAMILY MEDICINE CLINIC | Age: 63
End: 2018-07-12

## 2018-07-12 NOTE — PROGRESS NOTES
NN health screening:    Noted colonoscopy surveillance recommendation: Benign polyp(s). Change callback to 5 years since hyperplastic.   I have updated the HM to reflect this.

## 2018-08-14 ENCOUNTER — PATIENT OUTREACH (OUTPATIENT)
Dept: FAMILY MEDICINE CLINIC | Age: 63
End: 2018-08-14

## 2018-08-14 NOTE — PROGRESS NOTES
NN health screening:    Ms Rusty Haddad states \"I was going to get it done next month. \" Instructed on the importance of obtaining US when recommended which should have been April. Explained the radiologist's reasoning for this 6 month f/u US and to please not delay any longer.

## 2018-09-05 DIAGNOSIS — I10 ESSENTIAL HYPERTENSION: ICD-10-CM

## 2018-09-05 RX ORDER — FUROSEMIDE 40 MG/1
TABLET ORAL
Qty: 90 TAB | Refills: 0 | Status: SHIPPED | OUTPATIENT
Start: 2018-09-05 | End: 2019-07-10 | Stop reason: SDUPTHER

## 2018-09-10 ENCOUNTER — OFFICE VISIT (OUTPATIENT)
Dept: FAMILY MEDICINE CLINIC | Age: 63
End: 2018-09-10

## 2018-09-10 VITALS
DIASTOLIC BLOOD PRESSURE: 68 MMHG | OXYGEN SATURATION: 98 % | SYSTOLIC BLOOD PRESSURE: 150 MMHG | WEIGHT: 202.2 LBS | TEMPERATURE: 98.1 F | BODY MASS INDEX: 31.74 KG/M2 | HEART RATE: 62 BPM | RESPIRATION RATE: 12 BRPM | HEIGHT: 67 IN

## 2018-09-10 DIAGNOSIS — Z00.00 MEDICARE ANNUAL WELLNESS VISIT, SUBSEQUENT: Primary | ICD-10-CM

## 2018-09-10 DIAGNOSIS — Z71.89 ACP (ADVANCE CARE PLANNING): ICD-10-CM

## 2018-09-10 DIAGNOSIS — Z11.59 NEED FOR HEPATITIS C SCREENING TEST: ICD-10-CM

## 2018-09-10 DIAGNOSIS — Z23 ENCOUNTER FOR IMMUNIZATION: ICD-10-CM

## 2018-09-10 DIAGNOSIS — I50.40 COMBINED SYSTOLIC AND DIASTOLIC CONGESTIVE HEART FAILURE, UNSPECIFIED HF CHRONICITY (HCC): ICD-10-CM

## 2018-09-10 DIAGNOSIS — E11.42 DIABETIC PERIPHERAL NEUROPATHY ASSOCIATED WITH TYPE 2 DIABETES MELLITUS (HCC): ICD-10-CM

## 2018-09-10 RX ORDER — DOXAZOSIN 8 MG/1
8 TABLET ORAL DAILY
COMMUNITY
End: 2020-06-16 | Stop reason: SDUPTHER

## 2018-09-10 NOTE — PROGRESS NOTES
Patient here for f/u on her HTN and her [address] today. 1. Have you been to the ER, urgent care clinic since your last visit? Hospitalized since your last visit? No 
2. Have you seen or consulted any other health care providers outside of the [address] [place] [address] since your last visit? Include any pap smears or colon screening. No 
 
Medication reconciliation has been completed with patient. Care team discussed/updated as well as pharmacy. Care everywhere has been ran. Health Maintenance reviewed - Will discuss need for Zoster, Pneumonia with provider, Hep C pended, will request foot exam from Dr. Janice Pagan, A1c from Dr. Brando Whittington. . 
 
 
This is the Subsequent Medicare Annual Wellness Exam, performed 12 months or more after the Initial AWV or the last Subsequent AWV I have reviewed the patient's medical history in detail and updated the computerized patient record. History Past Medical History:  
Diagnosis Date  Burning with urination  Charcot's joint disease due to secondary diabetes ([address] 2009  Diabetes ([address]  Diabetic peripheral neuropathy ([address]  Diabetic retinopathy ([address]  H/O transfusion of packed red blood cells 2009  Hypercholesterolemia  Hypertension Past Surgical History:  
Procedure Laterality Date  COLONOSCOPY N/A 5/30/2018 COLONOSCOPY with biopsies performed by Harsha Santoyo MD at [address] [place] [address] HX CATARACT REMOVAL Bilateral 2003  HX COLONOSCOPY  2008  
 hx of polyps [facility]  2011 LT ANKLE-CHARCOT JOINT, metal renard placed  HX ORTHOPAEDIC    
 right ankle, metal renard placed  HX RETINAL DETACHMENT REPAIR Bilateral 2003 Current Outpatient Prescriptions Medication Sig Dispense Refill  doxazosin (CARDURA) 8 mg tablet Take 8 mg by mouth daily.  furosemide (LASIX) 40 mg tablet TAKE 1/2 TABLET BY MOUTH TWO TIMES A DAY 90 Tab 0  
 amLODIPine (NORVASC) 10 mg tablet Take 1 Tab by mouth daily.  30 Tab 0  
  valsartan (DIOVAN) 80 mg tablet Take 1 Tab by mouth daily. 30 Tab 5  cloNIDine HCl (CATAPRES) 0.3 mg tablet Take 1 Tab by mouth two (2) times a day. 60 Tab 2  
 isosorbide mononitrate ER (IMDUR) 30 mg tablet   1  clopidogrel (PLAVIX) 75 mg tab Take 1 Tab by mouth daily. 30 Tab 2  
 metOLazone (ZAROXOLYN) 10 mg tablet Take 2.5 mg by mouth every other day.  hydrALAZINE (APRESOLINE) 100 mg tablet Take  by mouth two (2) times a day.  atorvastatin (LIPITOR) 80 mg tablet 80 mg.    
 insulin glargine (TOUJEO SOLOSTAR) 300 unit/mL (1.5 mL) inpn 36 Units.  omega 3-dha-epa-fish oil (FISH OIL) 60- mg cap Take 500 mg by mouth two (2) times a day.  multivitamin (TAB-A-VINCENT) tablet Take 1 Tab by mouth daily.  VITAMIN A PO Take 1 Tab by mouth daily.  black cohosh 540 mg cap Take 40 mg by mouth daily.  metoprolol succinate (TOPROL XL) 50 mg XL tablet Take  by mouth daily.  glimepiride (AMARYL) 4 mg tablet Take  by mouth every morning.  Cholecalciferol, Vitamin D3, (VITAMIN D3) 1,000 unit cap Take  by mouth.  ZINC ACETATE PO Take  by mouth.  BD SINGLE USE SWABS REGULAR padm  BD INSULIN SYRINGE ULTRA-FINE 1 mL 30 x 1/2\" syrg No Known Allergies Family History Problem Relation Age of Onset  Hypertension Mother  Stroke Mother TIA [facility] Arthritis-osteo Mother  Cancer Mother   
  cytoblastoma  Hypertension Father  Diabetes Father  Heart Disease Father  Cancer Sister   
  colon  Diabetes Sister Social History Substance Use Topics  Smoking status: Never Smoker  Smokeless tobacco: Never Used  Alcohol use Yes Comment: rare Patient Active Problem List  
Diagnosis Code  Hypertension I10  Type 2 diabetes mellitus (HCC) E11.9  Hyperlipidemia E78.5  Diabetic retinopathy associated with type 2 diabetes mellitus ([facility] E11.319  
  Diabetic peripheral neuropathy associated with type 2 diabetes mellitus (HCC) E11.42  
 Charcot's joint arthropathy in type 2 diabetes mellitus ([facility] E11.610  
 Obesity E66.9  Mass of left breast on mammogram N63.20  ACP (advance care planning) Z71.89  
 Obesity, morbid ([facility] E66.01  
 Congestive heart failure ([facility] I50.9  Controlled type 2 diabetes mellitus without complication, with long-term current use of insulin (HCC) E11.9, Z79.4 Depression Risk Factor Screening: PHQ over the last two weeks 4/10/2018 Little interest or pleasure in doing things Not at all Feeling down, depressed, irritable, or hopeless Not at all Total Score PHQ 2 0 Alcohol Risk Factor Screening: You do not drink alcohol or very rarely. Functional Ability and Level of Safety:  
Hearing Loss Hearing is good. Activities of Daily Living The home contains: no safety equipment. Patient does total self care Fall Risk Fall Risk Assessment, last 12 mths 9/10/2018 Able to walk? Yes Fall in past 12 months? No  
 
 
Abuse Screen Patient is not abused Cognitive Screening Evaluation of Cognitive Function: 
Has your family/caregiver stated any concerns about your memory: no 
Normal 
 
Patient Care Team  
Patient Care Team: 
Cahrlotte Puga MD as PCP - [facility]) David Renee DPM (Podiatry) Sariah Santana MD (Ophthalmology) Stanly Holter, MD (Ophthalmology) Deanna Barksdale MD (Endocrinology) Martha Keenan RN as Ambulatory Care Navigator Lucía Rdz MD as Physician (Vascular Surgery) Barrera Moyer MD as Surgeon (Colon and Rectal Surgery) Assessment/Plan Education and counseling provided: 
Pateint has ACP and has been asked to bring copy to the office Diagnoses and all orders for this visit: 
 
1. Medicare annual wellness visit, subsequent 2. Need for hepatitis C screening test 
-     HEPATITIS C AB; Future 3. ACP (advance care planning) 4. Combined systolic and diastolic congestive heart failure, unspecified HF chronicity ([facility] 5. Diabetic peripheral neuropathy associated with type 2 diabetes mellitus (HCC) 
-     HEMOGLOBIN A1C WITH EAG; Future 6. Encounter for immunization 
-     INFLUENZA VACCINE INACTIVATED (IIV), SUBUNIT, ADJUVANTED, IM Health Maintenance Due Topic Date Due  
 Hepatitis C Screening  1955  
 FOOT EXAM Q1  02/09/1963  ZOSTER VACCINE AGE 60>  12/09/2012  Bone Densitometry (Dexa) Screening  02/09/2018  Pneumococcal 65+ Low/Medium Risk (1 of 2 - PCV13) 02/09/2018  Influenza Age 5 to Adult  08/01/2018  HEMOGLOBIN A1C Q6M  08/15/2018 Xuan Boone MD

## 2018-09-10 NOTE — PATIENT INSTRUCTIONS
Medicare Wellness Visit, Female The best way to live healthy is to have a lifestyle where you eat a well-balanced diet, exercise regularly, limit alcohol use, and quit all forms of tobacco/nicotine, if applicable. Regular preventive services are another way to keep healthy. Preventive services (vaccines, screening tests, monitoring & exams) can help personalize your care plan, which helps you manage your own care. Screening tests can find health problems at the earliest stages, when they are easiest to treat. [facility] follows the current, evidence-based guidelines published by the [facility] Anival Alexandro ([facility]) when recommending preventive services for our patients. Because we follow these guidelines, sometimes recommendations change over time as research supports it. (For example, mammograms used to be recommended annually. Even though Medicare will still pay for an annual mammogram, the newer guidelines recommend a mammogram every two years for women of average risk.) Of course, you and your doctor may decide to screen more often for some diseases, based on your risk and your health status. Preventive services for you include: - Medicare offers their members a free annual wellness visit, which is time for you and your primary care provider to discuss and plan for your preventive service needs. Take advantage of this benefit every year! 
-All adults over the age of 72 should receive the recommended pneumonia vaccines. Current USPSTF guidelines recommend a series of two vaccines for the best pneumonia protection.  
-All adults should have a flu vaccine yearly and a tetanus vaccine every 10 years. All adults age 61 and older should receive a shingles vaccine once in their lifetime.   
-A bone mass density test is recommended when a woman turns 65 to screen for osteoporosis. This test is only recommended one time, as a screening. Some providers will use this same test as a disease monitoring tool if you already have osteoporosis. -All adults age 38-68 who are overweight should have a diabetes screening test once every three years.  
-Other screening tests and preventive services for persons with diabetes include: an eye exam to screen for diabetic retinopathy, a kidney function test, a foot exam, and stricter control over your cholesterol.  
-Cardiovascular screening for adults with routine risk involves an electrocardiogram (ECG) at intervals determined by your doctor.  
-Colorectal cancer screenings should be done for adults age 54-65 with no increased risk factors for colorectal cancer. There are a number of acceptable methods of screening for this type of cancer. Each test has its own benefits and drawbacks. Discuss with your doctor what is most appropriate for you during your annual wellness visit. The different tests include: colonoscopy (considered the best screening method), a fecal occult blood test, a fecal DNA test, and sigmoidoscopy. -Breast cancer screenings are recommended every other year for women of normal risk, age 54-69. 
-Cervical cancer screenings for women over age 72 are only recommended with certain risk factors.  
-All adults born between [facility] should be screened once for Hepatitis C. Here is a list of your current Health Maintenance items (your personalized list of preventive services) with a due date: 
Health Maintenance Due Topic Date Due  
 Hepatitis C Test  1955 [facility] Diabetic Foot Care  02/09/1963  Shingles Vaccine  12/09/2012  Bone Mineral Density   02/09/2018  Pneumococcal Vaccine (1 of 2 - PCV13) 02/09/2018  Flu Vaccine  08/01/2018  Hemoglobin A1C    08/15/2018

## 2018-09-10 NOTE — ACP (ADVANCE CARE PLANNING)
Advance Care Planning (ACP) Provider Conversation Snapshot    Date of ACP Conversation: 09/10/18  Persons included in Conversation:  patient  Length of ACP Conversation in minutes:  16 minutes    Authorized Decision Maker (if patient is incapable of making informed decisions):    This person is:   Healthcare Agent/Medical Power of  under Advance Directive          For Patients with Decision Making Capacity:   Values/Goals: Exploration of values, goals, and preferences if recovery is not expected, even with continued medical treatment in the event of:  Imminent death  Severe, permanent brain injury    Conversation Outcomes / Follow-Up Plan:   Recommended completion of Advance Directive form after review of ACP materials and conversation with prospective healthcare agent

## 2018-09-10 NOTE — MR AVS SNAPSHOT
[facility] [address] [place] [address] 
[phone] Patient: Val Martinez MRN: [id number] UYU:[id number] Visit Information Date & Time Provider Department Dept. Phone Encounter #  
 9/10/2018  8:00 AM Analisa Harley MD [facility] [phone] [id number] Follow-up Instructions Return in about 6 months (around 3/10/2019). Upcoming Health Maintenance Date Due Hepatitis C Screening 1955 FOOT EXAM Q1 2/9/1963 ZOSTER VACCINE AGE 60> 12/9/2012 Bone Densitometry (Dexa) Screening 2/9/2018 Pneumococcal 65+ Low/Medium Risk (1 of 2 - PCV13) 2/9/2018 Influenza Age 5 to Adult 8/1/2018 HEMOGLOBIN A1C Q6M 8/15/2018 MICROALBUMIN Q1 10/27/2018 MEDICARE YEARLY EXAM 10/28/2018 EYE EXAM RETINAL OR DILATED Q1 12/14/2018 LIPID PANEL Q1 4/16/2019 BREAST CANCER SCRN MAMMOGRAM 10/31/2019 GLAUCOMA SCREENING Q2Y 12/14/2019 COLONOSCOPY 5/30/2023 DTaP/Tdap/Td series (2 - Td) 1/19/2028 Allergies as of 9/10/2018  Review Complete On: 9/10/2018 By: Analisa Harley MD  
 No Known Allergies Current Immunizations  Never Reviewed Name Date Influenza Vaccine 10/1/2017 12:00 AM  
 Influenza Vaccine (Quad) PF 10/27/2017 Influenza Vaccine (Tri) Adjuvanted  Incomplete Not reviewed this visit You Were Diagnosed With   
  
 Codes Comments Medicare annual wellness visit, subsequent    -  Primary ICD-10-CM: Z00.00 ICD-9-CM: V70.0 Need for hepatitis C screening test     ICD-10-CM: Z11.59 
ICD-9-CM: V73.89   
 ACP (advance care planning)     ICD-10-CM: Z71.89 ICD-9-CM: V65.49 Combined systolic and diastolic congestive heart failure, unspecified HF chronicity ([facility] [address]     ICD-10-CM: I50.40 ICD-9-CM: 428.40 Diabetic peripheral neuropathy associated with type 2 diabetes mellitus (HCC)     ICD-10-CM: E11.42 
ICD-9-CM: 250.60, 357.2 Encounter for immunization     ICD-10-CM: K06 ICD-9-CM: V03.89 Vitals BP Pulse Temp Resp Height(growth percentile) Weight(growth percentile) 150/68 62 98.1 °F (36.7 °C) (Oral) 12 5' 7\" (1.702 m) 202 lb 3.2 oz (91.7 kg) SpO2 BMI OB Status Smoking Status 98% 31.67 kg/m2 Menopause Never Smoker BMI and BSA Data Body Mass Index Body Surface Area  
 31.67 kg/m 2 2.08 m 2 Preferred Pharmacy Pharmacy Name Phone [id number] - [place] [phone] Your Updated Medication List  
  
   
This list is accurate as of 9/10/18  8:36 AM.  Always use your most recent med list.  
  
  
  
  
 AMARYL 4 mg tablet Generic drug:  glimepiride Take  by mouth every morning. amLODIPine 10 mg tablet Commonly known as:  Frandy Pandeybury Take 1 Tab by mouth daily. atorvastatin 80 mg tablet Commonly known as:  LIPITOR 80 mg.  
  
 BD INSULIN SYRINGE ULTRA-FINE 1 mL 30 gauge x 1/2\" Syrg Generic drug:  Insulin Syringe-Needle U-100 BD Single Use Swabs Regular Padm Generic drug:  alcohol swabs  
  
 black cohosh 540 mg Cap Take 40 mg by mouth daily. CARDURA 8 mg tablet Generic drug:  doxazosin Take 8 mg by mouth daily. cloNIDine HCl 0.3 mg tablet Commonly known as:  CATAPRES Take 1 Tab by mouth two (2) times a day. clopidogrel 75 mg Tab Commonly known as:  PLAVIX Take 1 Tab by mouth daily. FISH OIL 60- mg Cap Generic drug:  omega 3-dha-epa-fish oil Take 500 mg by mouth two (2) times a day. furosemide 40 mg tablet Commonly known as:  LASIX TAKE 1/2 TABLET BY MOUTH TWO TIMES A DAY  
  
 hydrALAZINE 100 mg tablet Commonly known as:  APRESOLINE Take  by mouth two (2) times a day. isosorbide mononitrate ER 30 mg tablet Commonly known as:  IMDUR  
  
 metOLazone 10 mg tablet Commonly known as:  Fabiene Solid Take 2.5 mg by mouth every other day. TAB-A-VINCENT tablet Generic drug:  multivitamin Take 1 Tab by mouth daily. TOPROL XL 50 mg XL tablet Generic drug:  metoprolol succinate Take  by mouth daily. TOUJEO SOLOSTAR U-300 INSULIN 300 unit/mL (1.5 mL) Inpn Generic drug:  insulin glargine 36 Units. valsartan 80 mg tablet Commonly known as:  DIOVAN Take 1 Tab by mouth daily. VITAMIN A PO Take 1 Tab by mouth daily. VITAMIN D3 1,000 unit Cap Generic drug:  cholecalciferol Take  by mouth. ZINC ACETATE PO Take  by mouth. We Performed the Following INFLUENZA VACCINE INACTIVATED (IIV), SUBUNIT, ADJUVANTED, IM [id number] CPT(R)] Follow-up Instructions Return in about 6 months (around 3/10/2019). To-Do List   
 09/10/2018 Lab:  HEMOGLOBIN A1C WITH EAG   
  
 09/10/2018 Lab:  HEPATITIS C AB Patient Instructions Medicare Wellness Visit, Female The best way to live healthy is to have a lifestyle where you eat a well-balanced diet, exercise regularly, limit alcohol use, and quit all forms of tobacco/nicotine, if applicable. Regular preventive services are another way to keep healthy. Preventive services (vaccines, screening tests, monitoring & exams) can help personalize your care plan, which helps you manage your own care. Screening tests can find health problems at the earliest stages, when they are easiest to treat. [facility] follows the current, evidence-based guidelines published by the [facility] (USPSTF) when recommending preventive services for our patients. Because we follow these guidelines, sometimes recommendations change over time as research supports it. (For example, mammograms used to be recommended annually. Even though Medicare will still pay for an annual mammogram, the newer guidelines recommend a mammogram every two years for women of average risk.) Of course, you and your doctor may decide to screen more often for some diseases, based on your risk and your health status. Preventive services for you include: - Medicare offers their members a free annual wellness visit, which is time for you and your primary care provider to discuss and plan for your preventive service needs. Take advantage of this benefit every year! 
-All adults over the age of 72 should receive the recommended pneumonia vaccines. Current USPSTF guidelines recommend a series of two vaccines for the best pneumonia protection.  
-All adults should have a flu vaccine yearly and a tetanus vaccine every 10 years. All adults age 61 and older should receive a shingles vaccine once in their lifetime.   
-A bone mass density test is recommended when a woman turns 65 to screen for osteoporosis. This test is only recommended one time, as a screening. Some providers will use this same test as a disease monitoring tool if you already have osteoporosis. -All adults age 38-68 who are overweight should have a diabetes screening test once every three years.  
-Other screening tests and preventive services for persons with diabetes include: an eye exam to screen for diabetic retinopathy, a kidney function test, a foot exam, and stricter control over your cholesterol.  
-Cardiovascular screening for adults with routine risk involves an electrocardiogram (ECG) at intervals determined by your doctor.  
-Colorectal cancer screenings should be done for adults age 54-65 with no increased risk factors for colorectal cancer. There are a number of acceptable methods of screening for this type of cancer. Each test has its own benefits and drawbacks. Discuss with your doctor what is most appropriate for you during your annual wellness visit. The different tests include: colonoscopy (considered the best screening method), a fecal occult blood test, a fecal DNA test, and sigmoidoscopy. -Breast cancer screenings are recommended every other year for women of normal risk, age 54-69. 
-Cervical cancer screenings for women over age 72 are only recommended with certain risk factors.  
-All adults born between [facility] should be screened once for Hepatitis C. Here is a list of your current Health Maintenance items (your personalized list of preventive services) with a due date: 
Health Maintenance Due Topic Date Due  
 Hepatitis C Test  1955 Jones Gallagherr Diabetic Foot Care  02/09/1963  Shingles Vaccine  12/09/2012  Bone Mineral Density   02/09/2018  Pneumococcal Vaccine (1 of 2 - PCV13) 02/09/2018  Flu Vaccine  08/01/2018  Hemoglobin A1C    08/15/2018 Introducing hospitals & HEALTH SERVICES! [employer] introduces [employer] patient portal. Now you can access parts of your medical record, email your doctor's office, and request medication refills online. 1. In your internet browser, go to [email or web address] Your Cart 2. Click on the First Time User? Click Here link in the Sign In box. You will see the New Member Sign Up page. 3. Enter your [employer] Access Code exactly as it appears below. You will not need to use this code after youve completed the sign-up process. If you do not sign up before the expiration date, you must request a new code. · [employer] Access Code: [email or web address] Expires: 12/9/2018  8:36 AM 
 
4. Enter the last four digits of your Social Security Number (xxxx) and Date of Birth (mm/dd/yyyy) as indicated and click Submit. You will be taken to the next sign-up page. 5. Create a [employer] ID. This will be your [employer] login ID and cannot be changed, so think of one that is secure and easy to remember. 6. Create a [employer] password. You can change your password at any time. 7. Enter your Password Reset Question and Answer. This can be used at a later time if you forget your password. 8. Enter your e-mail address. You will receive e-mail notification when new information is available in [address] 9. Click Sign Up. You can now view and download portions of your medical record. 10. Click the Download Summary menu link to download a portable copy of your medical information. If you have questions, please visit the Frequently Asked Questions section of the [employer] website. Remember, [employer] is NOT to be used for urgent needs. For medical emergencies, dial 911. Now available from your iPhone and Android! Please provide this summary of care documentation to your next provider. Your primary care clinician is listed as [phone] [facility]. If you have any questions after today's visit, please call [phone].

## 2018-09-14 DIAGNOSIS — I10 ESSENTIAL HYPERTENSION: ICD-10-CM

## 2018-09-15 DIAGNOSIS — I10 ESSENTIAL HYPERTENSION: ICD-10-CM

## 2018-09-17 ENCOUNTER — TELEPHONE (OUTPATIENT)
Dept: FAMILY MEDICINE CLINIC | Age: 63
End: 2018-09-17

## 2018-09-17 RX ORDER — CLOPIDOGREL BISULFATE 75 MG/1
TABLET ORAL
Qty: 30 TAB | Refills: 1 | Status: SHIPPED | OUTPATIENT
Start: 2018-09-17 | End: 2019-07-10 | Stop reason: SDUPTHER

## 2018-09-17 RX ORDER — CLONIDINE HYDROCHLORIDE 0.3 MG/1
0.3 TABLET ORAL 2 TIMES DAILY
Qty: 60 TAB | Refills: 2 | Status: SHIPPED | OUTPATIENT
Start: 2018-09-17 | End: 2019-03-07 | Stop reason: SDUPTHER

## 2018-09-17 RX ORDER — CLONIDINE HYDROCHLORIDE 0.3 MG/1
TABLET ORAL
Qty: 60 TAB | Refills: 1 | Status: SHIPPED | OUTPATIENT
Start: 2018-09-17 | End: 2019-02-11 | Stop reason: SDUPTHER

## 2018-09-17 RX ORDER — CLOPIDOGREL BISULFATE 75 MG/1
75 TABLET ORAL DAILY
Qty: 30 TAB | Refills: 2 | Status: SHIPPED | OUTPATIENT
Start: 2018-09-17 | End: 2020-06-16 | Stop reason: SDUPTHER

## 2018-09-17 NOTE — TELEPHONE ENCOUNTER
Received a call from [facility] at [address] in [place] requesting a 90 day supply of Valsarten for Mrs. Seaman. She states insurance requires it. She would like this sent over as soon as possible. If you have any questions please contact her at [phone]. Last appointment was 9/10/18.

## 2018-09-18 DIAGNOSIS — I10 ESSENTIAL HYPERTENSION: ICD-10-CM

## 2018-09-18 RX ORDER — FUROSEMIDE 40 MG/1
TABLET ORAL
Qty: 90 TAB | Refills: 0 | Status: SHIPPED | OUTPATIENT
Start: 2018-09-18 | End: 2019-04-02 | Stop reason: SDUPTHER

## 2018-10-01 ENCOUNTER — TELEPHONE (OUTPATIENT)
Dept: FAMILY MEDICINE CLINIC | Age: 63
End: 2018-10-01

## 2018-10-01 NOTE — TELEPHONE ENCOUNTER
Looks like Metolazone 2.5 mg was last prescribed on 2/12/2018. Pt last seen for HTN 06/08/2018. Please advise.

## 2018-10-02 NOTE — TELEPHONE ENCOUNTER
Medication Refill (Metolazone 2.5mg. )            Maurice Escobedo MD   You 8 hours ago (9:04 AM)                Ok to refill once a day # 30 3 refills. (Routing comment)                        You routed conversation to Maurice Escobedo MD 23 hours ago (5:19 PM)                       You 23 hours ago (5:17 PM)                Looks like Metolazone 2.5 mg was last prescribed on 2/12/2018. Pt last seen for HTN 06/08/2018. Please advise. Refill encounter sent to dr Yoli Gonzalez.

## 2018-10-02 NOTE — TELEPHONE ENCOUNTER
Medication Refill (Metolazone 2.5mg. )            Indra Guzman MD   You 8 hours ago (9:04 AM)                Ok to refill once a day # 30 3 refills. (Routing comment)                        You routed conversation to Indra Guzman MD 23 hours ago (5:19 PM)                       You 23 hours ago (5:17 PM)                Looks like Metolazone 2.5 mg was last prescribed on 2/12/2018. Pt last seen for HTN 06/08/2018. Please advise.

## 2018-10-03 RX ORDER — METOLAZONE 2.5 MG/1
2.5 TABLET ORAL DAILY
Qty: 30 TAB | Refills: 3 | Status: SHIPPED | OUTPATIENT
Start: 2018-10-03 | End: 2018-11-02

## 2018-10-24 ENCOUNTER — PATIENT OUTREACH (OUTPATIENT)
Dept: FAMILY MEDICINE CLINIC | Age: 63
End: 2018-10-24

## 2018-10-24 NOTE — PROGRESS NOTES
NN health screening:    Have made several attempts over a lengthy period of time to assist Ms Susana Olmstead in completing her well woman and ultrasound of breast without success. Closed this episode of care.

## 2019-02-11 DIAGNOSIS — I10 ESSENTIAL HYPERTENSION: ICD-10-CM

## 2019-02-12 RX ORDER — CLONIDINE HYDROCHLORIDE 0.3 MG/1
TABLET ORAL
Qty: 48 TAB | Refills: 0 | Status: SHIPPED | OUTPATIENT
Start: 2019-02-12 | End: 2020-06-16 | Stop reason: SDUPTHER

## 2019-02-20 DIAGNOSIS — I10 ESSENTIAL HYPERTENSION: ICD-10-CM

## 2019-02-20 RX ORDER — VALSARTAN 80 MG/1
TABLET ORAL
Qty: 30 TAB | Refills: 4 | Status: SHIPPED | OUTPATIENT
Start: 2019-02-20 | End: 2019-04-08 | Stop reason: SDUPTHER

## 2019-03-07 DIAGNOSIS — I10 ESSENTIAL HYPERTENSION: ICD-10-CM

## 2019-03-07 RX ORDER — CLONIDINE HYDROCHLORIDE 0.3 MG/1
TABLET ORAL
Qty: 60 TAB | Refills: 1 | Status: SHIPPED | OUTPATIENT
Start: 2019-03-07 | End: 2019-07-10 | Stop reason: SDUPTHER

## 2019-04-02 DIAGNOSIS — I10 ESSENTIAL HYPERTENSION: ICD-10-CM

## 2019-04-03 RX ORDER — FUROSEMIDE 40 MG/1
TABLET ORAL
Qty: 90 TAB | Refills: 0 | Status: SHIPPED | OUTPATIENT
Start: 2019-04-03 | End: 2020-06-16

## 2019-04-08 DIAGNOSIS — I10 ESSENTIAL HYPERTENSION: ICD-10-CM

## 2019-04-09 RX ORDER — VALSARTAN 80 MG/1
TABLET ORAL
Qty: 90 TAB | Refills: 0 | Status: SHIPPED | OUTPATIENT
Start: 2019-04-09 | End: 2020-06-16

## 2019-04-09 RX ORDER — VALSARTAN 80 MG/1
TABLET ORAL
Qty: 30 TAB | Refills: 4 | Status: SHIPPED | OUTPATIENT
Start: 2019-04-09 | End: 2019-07-10 | Stop reason: SDUPTHER

## 2019-05-17 LAB — LDL-C, EXTERNAL: 35

## 2019-07-02 LAB — HBA1C MFR BLD HPLC: 7.7 %

## 2019-07-10 ENCOUNTER — PATIENT OUTREACH (OUTPATIENT)
Dept: FAMILY MEDICINE CLINIC | Age: 64
End: 2019-07-10

## 2019-07-10 ENCOUNTER — OFFICE VISIT (OUTPATIENT)
Dept: FAMILY MEDICINE CLINIC | Age: 64
End: 2019-07-10

## 2019-07-10 VITALS
BODY MASS INDEX: 30.07 KG/M2 | DIASTOLIC BLOOD PRESSURE: 60 MMHG | RESPIRATION RATE: 14 BRPM | SYSTOLIC BLOOD PRESSURE: 146 MMHG | WEIGHT: 191.6 LBS | OXYGEN SATURATION: 97 % | HEIGHT: 67 IN | HEART RATE: 60 BPM | TEMPERATURE: 98.2 F

## 2019-07-10 DIAGNOSIS — I50.22 CHRONIC SYSTOLIC CONGESTIVE HEART FAILURE (HCC): Primary | ICD-10-CM

## 2019-07-10 DIAGNOSIS — E78.49 OTHER HYPERLIPIDEMIA: ICD-10-CM

## 2019-07-10 DIAGNOSIS — I10 ESSENTIAL HYPERTENSION: ICD-10-CM

## 2019-07-10 DIAGNOSIS — E11.42 DIABETIC PERIPHERAL NEUROPATHY ASSOCIATED WITH TYPE 2 DIABETES MELLITUS (HCC): ICD-10-CM

## 2019-07-10 NOTE — PROGRESS NOTES
Patient here for follow up on her hospital admittance to [facility] for weakness. 1. Have you been to the ER, urgent care clinic since your last visit? Hospitalized since your last visit? Yes When: 6/30/19-7/6/19 Where: [facility] Reason for visit: weakness  2. Have you seen or consulted any other health care providers outside of the [address] [place] [address] since your last visit? Include any pap smears or colon screening. No    Medication reconciliation has been completed with patient. Care team discussed/updated as well as pharmacy. Health Maintenance reviewed - Declined MWV today. Charlee Amado

## 2019-07-10 NOTE — PROGRESS NOTES
Idalmis Betancur is a 75 y.o. female  presents for follow up for weakness. She has been started on dialysis. She has no other sxs. She has less weakness. No Known Allergies  Outpatient Medications Marked as Taking for the 7/10/19 encounter (Office Visit) with Nina Mistry MD   Medication Sig Dispense Refill    valsartan (DIOVAN) 80 mg tablet TAKE ONE TABLET BY MOUTH DAILY 90 Tab 0    cloNIDine HCl (CATAPRES) 0.3 mg tablet TAKE ONE TABLET BY MOUTH TWICE A DAY 48 Tab 0    clopidogrel (PLAVIX) 75 mg tab Take 1 Tab by mouth daily. 30 Tab 2    doxazosin (CARDURA) 8 mg tablet Take 8 mg by mouth daily.  amLODIPine (NORVASC) 10 mg tablet Take 1 Tab by mouth daily. 30 Tab 0    isosorbide mononitrate ER (IMDUR) 30 mg tablet   1    hydrALAZINE (APRESOLINE) 100 mg tablet Take  by mouth two (2) times a day.  atorvastatin (LIPITOR) 80 mg tablet 80 mg.      insulin glargine (TOUJEO SOLOSTAR) 300 unit/mL (1.5 mL) inpn 36 Units.  omega 3-dha-epa-fish oil (FISH OIL) 60- mg cap Take 500 mg by mouth two (2) times a day.  multivitamin (TAB-A-VINCENT) tablet Take 1 Tab by mouth daily.  VITAMIN A PO Take 1 Tab by mouth daily.  black cohosh 540 mg cap Take 40 mg by mouth daily.  Cholecalciferol, Vitamin D3, (VITAMIN D3) 1,000 unit cap Take  by mouth.  ZINC ACETATE PO Take  by mouth.       BD SINGLE USE SWABS REGULAR padm       BD INSULIN SYRINGE ULTRA-FINE 1 mL 30 x 1/2\" syrg        Patient Active Problem List   Diagnosis Code    Hypertension I10    Type 2 diabetes mellitus (HCC) E11.9    Hyperlipidemia E78.5    Diabetic retinopathy associated with type 2 diabetes mellitus ([address] E11.319    Diabetic peripheral neuropathy associated with type 2 diabetes mellitus ([address] E11.42    Charcot's joint arthropathy in type 2 diabetes mellitus ([facility] [address] E11.610    Obesity E66.9    Mass of left breast on mammogram N63.20    ACP (advance care planning) Z71.89    Obesity, morbid ([address] E66.01    Congestive heart failure ([facility]) I50.9    Controlled type 2 diabetes mellitus without complication, with long-term current use of insulin ([facility]) E11.9, Z79.4     Past Medical History:   Diagnosis Date    Burning with urination     Charcot's joint disease due to secondary diabetes ([facility] [address] 2009    Diabetes ([facility] [address]     Diabetic peripheral neuropathy ([facility] [address]     Diabetic retinopathy ([facility] [address]     H/O transfusion of packed red blood cells 2009    Hypercholesterolemia     Hypertension      Social History     Socioeconomic History    Marital status:      Spouse name: Not on file    Number of children: Not on file    Years of education: Not on file    Highest education level: Not on file   Tobacco Use    Smoking status: Never Smoker    Smokeless tobacco: Never Used   Substance and Sexual Activity    Alcohol use: Yes     Comment: rare    Drug use: No    Sexual activity: Yes     Partners: Male     Birth control/protection: None     Family History   Problem Relation Age of Onset    Hypertension Mother     Stroke Mother         TIA    Arthritis-osteo Mother     Cancer Mother         cytoblastoma    Hypertension Father     Diabetes Father     Heart Disease Father     Cancer Sister         colon    Diabetes Sister         Review of Systems   Constitutional: Negative for chills, fever, malaise/fatigue and weight loss. Eyes: Negative for blurred vision. Respiratory: Negative for shortness of breath and wheezing. Cardiovascular: Negative for chest pain. Gastrointestinal: Negative for nausea and vomiting. Musculoskeletal: Negative for myalgias. Skin: Negative for rash. Neurological: Positive for weakness.        Vitals:    07/10/19 1515   BP: 146/60   Pulse: 60   Resp: 14   Temp: 98.2 °F (36.8 °C)   TempSrc: Oral   SpO2: 97%   Weight: 191 lb 9.6 oz (86.9 kg)   Height: 5' 7\" (1.702 m)   PainSc:   0 - No pain       Physical Exam   Constitutional: She is oriented to person, place, and time and well-developed, well-nourished, and in no distress. Neck: Normal range of motion. Neck supple. No thyromegaly present. Cardiovascular: Normal rate, regular rhythm and normal heart sounds. Pulmonary/Chest: Effort normal and breath sounds normal.   Musculoskeletal: Normal range of motion. Neurological: She is alert and oriented to person, place, and time. Skin: Skin is warm and dry. Psychiatric: Mood, memory, affect and judgment normal.   Nursing note and vitals reviewed. Assessment/Plan      ICD-10-CM ICD-9-CM    1. Chronic systolic congestive heart failure (HCC) I50.22 428.22 Stable      428.0    2. Diabetic peripheral neuropathy associated with type 2 diabetes mellitus (HCC) E11.42 250.60 Stable      357.2    3. Other hyperlipidemia E78.49 272.4 Stable    4. Essential hypertension I10 401.9 Improved. I have discussed the diagnosis with the patient and the intended plan of care as seen in the above orders. The patient has received an after-visit summary and questions were answered concerning future plans. I have discussed medication, side effects, and warnings with the patient in detail. The patient verbalized understanding and is in agreement with the plan of care. The patient will contact the office with any additional concerns.         lab results and schedule of future lab studies reviewed with patient    Devin Mcfadden MD

## 2019-07-10 NOTE — PATIENT INSTRUCTIONS
Type 2 Diabetes: Care Instructions Your Care Instructions Type 2 diabetes is a disease that develops when the body's tissues cannot use insulin properly. Over time, the pancreas cannot make enough insulin. Insulin is a hormone that helps the body's cells use sugar (glucose) for energy. It also helps the body store extra sugar in muscle, fat, and liver cells. Without insulin, the sugar cannot get into the cells to do its work. It stays in the blood instead. This can cause high blood sugar levels. A person has diabetes when the blood sugar stays too high too much of the time. Over time, diabetes can lead to diseases of the heart, blood vessels, nerves, kidneys, and eyes. You may be able to control your blood sugar by losing weight, eating a healthy diet, and getting daily exercise. You may also have to take insulin or other diabetes medicine. Follow-up care is a key part of your treatment and safety. Be sure to make and go to all appointments. Call your doctor if you are having problems. It's also a good idea to know your test results and keep a list of the medicines you take. How can you care for yourself at home? · Keep your blood sugar at a target level (which you set with your doctor). ? Eat a good diet that spreads carbohydrate throughout the day. Carbohydratethe body's main source of fuelaffects blood sugar more than any other nutrient. Carbohydrate is in fruits, vegetables, milk, and yogurt. It also is in breads, cereals, vegetables such as potatoes and corn, and sugary foods such as candy and cakes. ? Aim for 30 minutes of exercise on most, preferably all, days of the week. Walking is a good choice. You also may want to do other activities, such as running, swimming, cycling, or playing tennis or team sports. If your doctor says it's okay, do muscle-strengthening exercises at least 2 times a week. ? Take your medicines exactly as prescribed.  Call your doctor if you think you are having a problem with your medicine. You will get more details on the specific medicines your doctor prescribes. · Check your blood sugar as often as your doctor recommends. It is important to keep track of any symptoms you have, such as low blood sugar. Also tell your doctor if you have any changes in your activities, diet, or insulin use. · Talk to your doctor before you start taking aspirin every day. Aspirin can help certain people lower their risk of a heart attack or stroke. But taking aspirin isn't right for everyone, because it can cause serious bleeding. · Do not smoke. If you need help quitting, talk to your doctor about stop-smoking programs and medicines. These can increase your chances of quitting for good. · Keep your cholesterol and blood pressure at normal levels. You may need to take one or more medicines to reach your goals. Take them exactly as directed. Do not stop or change a medicine without talking to your doctor first. 
When should you call for help? Call 911 anytime you think you may need emergency care. For example, call if: 
  · You passed out (lost consciousness), or you suddenly become very sleepy or confused. (You may have very low blood sugar.)  
 Call your doctor now or seek immediate medical care if: 
  · Your blood sugar is 300 mg/dL or is higher than the level your doctor has set for you.  
  · You have symptoms of low blood sugar, such as: ? Sweating. ? Feeling nervous, shaky, and weak. ? Extreme hunger and slight nausea. ? Dizziness and headache. 
? Blurred vision. ? Confusion.  
 Watch closely for changes in your health, and be sure to contact your doctor if: 
  · You often have problems controlling your blood sugar.  
  · You have symptoms of long-term diabetes problems, such as: ? New vision changes. ? New pain, numbness, or tingling in your hands or feet. ? Skin problems. Where can you learn more? Go to http://lei-aleah.info/. Enter C553 in the search box to learn more about \"Type 2 Diabetes: Care Instructions. \" Current as of: July 25, 2018 Content Version: 11.9 © 2392-3174 [employer]. Care instructions adapted under license by [employer] (which disclaims liability or warranty for this information). If you have questions about a medical condition or this instruction, always ask your healthcare professional. [place] [address] any warranty or liability for your use of this information.

## 2019-07-10 NOTE — PROGRESS NOTES
Hospital Discharge Follow-Up      Date/Time:  7/10/2019 3:39 PM    Patient was admitted to [facility] on 6/30/19 and discharged on 7/6/19 for acute on chronic anemia due to CKD. Discharge Diagnosis:    Acute on chronic anemia due to CKD  CKD5 - RUBEN r/o; Elevated Bun/Creat on admission 2/2 advancement of CKD to ESRD  HTN  DM2 with long term insulin use  Chronic thrombocytopenia  Diarrhea - resolved  Chronic diastolic CHF  H/o IGOR  Severe pulm htn  Elevated troponins due to CKD  HLD    The physician discharge summary was available at the time of outreach. NN attempted to contact patient within 3 business days of discharge. Inpatient RRAT score: Medium Risk            18       Total Score        18 Charlson Comorbidity Score (Age + Comorbid Conditions)        Criteria that do not apply:    Has Seen PCP in Last 6 Months (Yes=3, No=0)    . Living with Significant Other. Assisted Living. LTAC. SNF. or   Rehab    Patient Length of Stay (>5 days = 3)    IP Visits Last 12 Months (1-3=4, 4=9, >4=11)    Pt. Coverage (Medicare=5 , Medicaid, or Self-Pay=4)      Was this a readmission? no       NN attempted to contact patient at listed number. Unable to leave  as mailbox is full.

## 2019-08-21 ENCOUNTER — PATIENT OUTREACH (OUTPATIENT)
Dept: FAMILY MEDICINE CLINIC | Age: 64
End: 2019-08-21

## 2019-08-21 RX ORDER — CARVEDILOL 12.5 MG/1
25 TABLET ORAL 2 TIMES DAILY
COMMUNITY
End: 2020-06-16 | Stop reason: DRUGHIGH

## 2019-08-21 RX ORDER — CARVEDILOL 12.5 MG/1
6.25 TABLET ORAL
COMMUNITY
End: 2020-06-16 | Stop reason: DRUGHIGH

## 2019-08-21 NOTE — PROGRESS NOTES
Hospital Discharge Follow-Up      Date/Time:  2019 12:14 PM    Patient was admitted to [facility] on 19 and discharged on 19 for   Discharge Diagnosis:  Mesenteric ischemia with narcotic distal ileum     Operative Procedures:  Exploratory laparotomy, small-bowel resection with primary anastomosis, intraoperative duplex of the mesenteric vessels.      The physician discharge summary was available at the time of outreach. Top Challenges reviewed with the provider   Patient states incision is healing well, staples are to be reomved next week at surgeon appointment         Method of communication with provider :chart routing    Inpatient RRAT score: Medium Risk            18       Total Score        18 Charlson Comorbidity Score (Age + Comorbid Conditions)        Criteria that do not apply:    Has Seen PCP in Last 6 Months (Yes=3, No=0)    . Living with Significant Other. Assisted Living. LTAC. SNF. or   Rehab    Patient Length of Stay (>5 days = 3)    IP Visits Last 12 Months (1-3=4, 4=9, >4=11)    Pt. Coverage (Medicare=5 , Medicaid, or Self-Pay=4)      Was this a readmission? yes   Nevaeh Galvanmegan 33 -17 acute on chronic anemia due to CKD    Nurse Navigator (NN) contacted the patient by telephone to perform post hospital discharge assessment. Verified name and  with patient as identifiers. Provided introduction to self, and explanation of the Nurse Navigator role. Reviewed red flags with patient who verbalized understanding. Patient given an opportunity to ask questions and does not have any further questions or concerns at this time. The patient agrees to contact the PCP office for questions related to their healthcare. NN provided contact information for future reference. Disease Specific:   N/A    Summary of patient's top problems:  1. Exp lap - states incision is healing well. Denies any excessive warmth, drainage or pain. Barriers to care?  None noted at present time    Advance Care Planning:   Does patient have an Advance Directive:  not on file     Medication(s):   Medication reconciliation was performed with patient, who verbalizes understanding of administration of home medications. There were no barriers to obtaining medications identified at this time. [facility] follow up appointment(s):   Future Appointments   Date Time Provider Jenise Zita   8/29/2019  8:15 AM Renea Delgadillo MD [facility]      Non-[facility] follow up appointment(s): Dr. Kalee Chou on 8/26/19  [facility]:  out of service area       Goals      Prevent complications post hospitalization. Patient will attend all physician appointments as scheduled      NN will follow patient for at least 4 weeks post hospital discharge       Understands red flags post discharge.       Patient will be able to verbalize s/s to report to physician         Excessive pain, swelling, redness, fluid or odor of or around the surgical area  Temperature over 100.4            Abdominal pain that becomes worse            Severe diarrhea, bloating or constipation            Nausea or vomiting

## 2019-08-29 ENCOUNTER — OFFICE VISIT (OUTPATIENT)
Dept: FAMILY MEDICINE CLINIC | Age: 64
End: 2019-08-29

## 2019-08-29 VITALS
DIASTOLIC BLOOD PRESSURE: 68 MMHG | HEART RATE: 83 BPM | SYSTOLIC BLOOD PRESSURE: 134 MMHG | TEMPERATURE: 100.4 F | OXYGEN SATURATION: 98 % | RESPIRATION RATE: 16 BRPM | WEIGHT: 184 LBS | BODY MASS INDEX: 28.88 KG/M2 | HEIGHT: 67 IN

## 2019-08-29 DIAGNOSIS — K55.9 ISCHEMIC BOWEL DISEASE (HCC): Primary | ICD-10-CM

## 2019-08-29 RX ORDER — CIPROFLOXACIN 500 MG/1
500 TABLET ORAL 2 TIMES DAILY
Qty: 20 TAB | Refills: 0 | Status: SHIPPED | OUTPATIENT
Start: 2019-08-29 | End: 2019-09-08

## 2019-08-29 NOTE — PROGRESS NOTES
Magnus Whitney is a 75 y.o. female presents in office for    Chief Complaint   Patient presents with   [facility] Follow Up     CKD        Visit Vitals  /68 (BP 1 Location: Right arm, BP Patient Position: Sitting)   Pulse 83   Temp 100.4 °F (38 °C) (Oral)   Resp 16   Ht 5' 7\" (1.702 m)   Wt 184 lb (83.5 kg)   SpO2 98%   BMI 28.82 kg/m²         Health Maintenance Due   Topic Date Due    FOOT EXAM Q1  02/09/1965    Shingrix Vaccine Age 50> (1 of 2) 02/09/2003    Bone Densitometry (Dexa) Screening  02/09/2018    MICROALBUMIN Q1  10/27/2018    Influenza Age 9 to Adult  08/01/2019    MEDICARE YEARLY EXAM  09/11/2019    BREAST CANCER SCRN MAMMOGRAM  10/31/2019         1. Have you been to the ER, urgent care clinic since your last visit? Hospitalized since your last visit? Nevaeh Francisco 8/2/19-8/9/19 r/t acute mesenteric ischemia    2. Have you seen or consulted any other health care providers outside of the [address] [place] [address] since your last visit? Include any pap smears or colon screening.  Nevaeh Francisco     Learning Assessment 3/16/2018   PRIMARY LEARNER Patient   HIGHEST LEVEL OF EDUCATION - PRIMARY LEARNER  -   BARRIERS PRIMARY LEARNER NONE     -   CO-LEARNER CAREGIVER -   PRIMARY LANGUAGE ENGLISH   LEARNER PREFERENCE PRIMARY LISTENING     -     -   ANSWERED BY patient   RELATIONSHIP SELF

## 2019-08-29 NOTE — PROGRESS NOTES
Neda Watkins is a 75 y.o. female  presents for follow up after discharge from hospital.  No nausea vomiting abdo pain or SOB. No Known Allergies  Outpatient Medications Marked as Taking for the 8/29/19 encounter (Office Visit) with Vikki Bhakta MD   Medication Sig Dispense Refill    carvedilol (COREG) 12.5 mg tablet Take 12.5 mg by mouth nightly.  carvedilol (COREG) 12.5 mg tablet Take 6.25 mg by mouth daily (with breakfast).  clopidogrel (PLAVIX) 75 mg tab Take 1 Tab by mouth daily. 30 Tab 2    doxazosin (CARDURA) 8 mg tablet Take 8 mg by mouth daily.  amLODIPine (NORVASC) 10 mg tablet Take 1 Tab by mouth daily. 30 Tab 0    isosorbide mononitrate ER (IMDUR) 30 mg tablet   1    atorvastatin (LIPITOR) 80 mg tablet Take 40 mg by mouth daily.  insulin glargine (TOUJEO SOLOSTAR) 300 unit/mL (1.5 mL) inpn 30 Units by SubCUTAneous route daily.  multivitamin (TAB-A-VINCENT) tablet Take 1 Tab by mouth daily.  VITAMIN A PO Take 1 Tab by mouth daily.  black cohosh 540 mg cap Take 40 mg by mouth daily.  Cholecalciferol, Vitamin D3, (VITAMIN D3) 1,000 unit cap Take  by mouth.  ZINC ACETATE PO Take  by mouth.       BD SINGLE USE SWABS REGULAR padm       BD INSULIN SYRINGE ULTRA-FINE 1 mL 30 x 1/2\" syrg        Patient Active Problem List   Diagnosis Code    Hypertension I10    Hyperlipidemia E78.5    Diabetic retinopathy associated with type 2 diabetes mellitus ([address] E11.319    Diabetic peripheral neuropathy associated with type 2 diabetes mellitus ([address] E11.42    Charcot's joint arthropathy in type 2 diabetes mellitus ([address] E11.610    Mass of left breast on mammogram N63.20    ACP (advance care planning) Z71.89    Obesity, morbid ([address] E66.01    Congestive heart failure ([address] I50.9     Past Medical History:   Diagnosis Date    Burning with urination     Charcot's joint disease due to secondary diabetes ([address] 2009    Diabetes ([address]     [facility]abetic peripheral neuropathy [facility]     Diabetic retinopathy ([facility]     H/O transfusion of packed red blood cells 2009    Hypercholesterolemia     Hypertension      Social History     Socioeconomic History    Marital status:      Spouse name: Not on file    Number of children: Not on file    Years of education: Not on file    Highest education level: Not on file   Tobacco Use    Smoking status: Never Smoker    Smokeless tobacco: Never Used   Substance and Sexual Activity    Alcohol use: Yes     Comment: rare    Drug use: No    Sexual activity: Yes     Partners: Male     Birth control/protection: None     Family History   Problem Relation Age of Onset    Hypertension Mother     Stroke Mother         TIA    Arthritis-osteo Mother     Cancer Mother         cytoblastoma    Hypertension Father     Diabetes Father     Heart Disease Father     Cancer Sister         colon    Diabetes Sister         Review of Systems   Constitutional: Negative for chills, fever, malaise/fatigue and weight loss. Eyes: Negative for blurred vision. Respiratory: Negative for shortness of breath and wheezing. Cardiovascular: Negative for chest pain. Gastrointestinal: Negative for abdominal pain, constipation, diarrhea, nausea and vomiting. Genitourinary: Negative for dysuria, frequency, hematuria and urgency. Musculoskeletal: Negative for myalgias. Skin: Negative for rash. Neurological: Negative for weakness. Vitals:    08/29/19 0826   BP: 134/68   Pulse: 83   Resp: 16   Temp: 100.4 °F (38 °C)   TempSrc: Oral   SpO2: 98%   Weight: 184 lb (83.5 kg)   Height: 5' 7\" (1.702 m)   PainSc:   0 - No pain       Physical Exam   Constitutional: She is oriented to person, place, and time and well-developed, well-nourished, and in no distress. Neck: Normal range of motion. Neck supple. No thyromegaly present. Cardiovascular: Normal rate, regular rhythm and normal heart sounds.    Pulmonary/Chest: Effort normal and breath sounds normal.   Abdominal: Soft. Bowel sounds are normal. She exhibits no distension. There is no tenderness. There is no rebound and no guarding. Musculoskeletal: Normal range of motion. Neurological: She is alert and oriented to person, place, and time. Skin: Skin is warm and dry. Nursing note and vitals reviewed. Assessment/Plan      ICD-10-CM ICD-9-CM    1. Ischemic bowel disease (HCC) K55.9 557.9 ciprofloxacin HCl (CIPRO) 500 mg tablet     Will go to ER if sxs increase or continue    I have discussed the diagnosis with the patient and the intended plan of care as seen in the above orders. The patient has received an after-visit summary and questions were answered concerning future plans. I have discussed medication, side effects, and warnings with the patient in detail. The patient verbalized understanding and is in agreement with the plan of care. The patient will contact the office with any additional concerns.     lab results and schedule of future lab studies reviewed with patient    Noel Otero MD

## 2019-08-29 NOTE — PATIENT INSTRUCTIONS
Colitis: Care Instructions  Your Care Instructions  Colitis is the medical term for swelling (inflammation) of the intestine. It can be caused by different things, such as an infection or loss of blood flow in the intestine. Other causes are problems like Crohn's disease or ulcerative colitis. Symptoms may include fever, diarrhea that may be bloody, or belly pain. Sometimes symptoms go away without treatment. But you may need treatment or more tests, such as blood tests or a stool test. Or you may need imaging tests like a CT scan or a colonoscopy. In some cases, the doctor may want to test a sample of tissue from the intestine. This test is called a biopsy. The doctor has checked you carefully, but problems can develop later. If you notice any problems or new symptoms, get medical treatment right away. Follow-up care is a key part of your treatment and safety. Be sure to make and go to all appointments, and call your doctor if you are having problems. It's also a good idea to know your test results and keep a list of the medicines you take. How can you care for yourself at home? · Rest until you feel better. · Your doctor may recommend that you eat bland foods. These include rice, dry toast or crackers, bananas, and applesauce. · To prevent dehydration, drink plenty of fluids. Choose water and other caffeine-free clear liquids until you feel better. If you have kidney, heart, or liver disease and have to limit fluids, talk with your doctor before you increase the amount of fluids you drink. · Be safe with medicines. Take your medicines exactly as prescribed. Call your doctor if you think you are having a problem with your medicine. You will get more details on the specific medicines your doctor prescribes. When should you call for help? Call 911 anytime you think you may need emergency care. For example, call if:    · You passed out (lost consciousness).     · Your stools are maroon or very bloody.  Call your doctor now or seek immediate medical care if:    · You have new or worse belly pain.     · You have a fever.     · You are vomiting.     · You cannot pass stools or gas.     · You have new or more blood in your stools.    Watch closely for changes in your health, and be sure to contact your doctor if:    · You have new or worse symptoms.     · You are losing weight.     · You do not get better as expected. Where can you learn more? Go to http://lei-aleah.info/. Severo Mandes in the search box to learn more about \"Colitis: Care Instructions. \"  Current as of: November 7, 2018  Content Version: 12.1  © 6191-6642 Healthwise, [employer]. Care instructions adapted under license by [employer] (which disclaims liability or warranty for this information). If you have questions about a medical condition or this instruction, always ask your healthcare professional. [address] any warranty or liability for your use of this information.

## 2019-09-04 ENCOUNTER — OFFICE VISIT (OUTPATIENT)
Dept: FAMILY MEDICINE CLINIC | Age: 64
End: 2019-09-04

## 2019-09-04 VITALS
SYSTOLIC BLOOD PRESSURE: 142 MMHG | OXYGEN SATURATION: 97 % | HEART RATE: 73 BPM | WEIGHT: 182 LBS | TEMPERATURE: 98.8 F | BODY MASS INDEX: 28.56 KG/M2 | DIASTOLIC BLOOD PRESSURE: 68 MMHG | HEIGHT: 67 IN | RESPIRATION RATE: 12 BRPM

## 2019-09-04 DIAGNOSIS — Z12.39 BREAST CANCER SCREENING: ICD-10-CM

## 2019-09-04 DIAGNOSIS — Z00.00 MEDICARE ANNUAL WELLNESS VISIT, SUBSEQUENT: Primary | ICD-10-CM

## 2019-09-04 DIAGNOSIS — Z71.89 ACP (ADVANCE CARE PLANNING): ICD-10-CM

## 2019-09-04 NOTE — PATIENT INSTRUCTIONS
Medicare Wellness Visit, Female     The best way to live healthy is to have a lifestyle where you eat a well-balanced diet, exercise regularly, limit alcohol use, and quit all forms of tobacco/nicotine, if applicable. Regular preventive services are another way to keep healthy. Preventive services (vaccines, screening tests, monitoring & exams) can help personalize your care plan, which helps you manage your own care. Screening tests can find health problems at the earliest stages, when they are easiest to treat. [facility] follows the current, evidence-based guidelines published by the [facility] ([facility]) when recommending preventive services for our patients. Because we follow these guidelines, sometimes recommendations change over time as research supports it. (For example, mammograms used to be recommended annually. Even though Medicare will still pay for an annual mammogram, the newer guidelines recommend a mammogram every two years for women of average risk.)  Of course, you and your doctor may decide to screen more often for some diseases, based on your risk and your health status. Preventive services for you include:  - Medicare offers their members a free annual wellness visit, which is time for you and your primary care provider to discuss and plan for your preventive service needs. Take advantage of this benefit every year!  -All adults over the age of 72 should receive the recommended pneumonia vaccines. Current USPSTF guidelines recommend a series of two vaccines for the best pneumonia protection.   -All adults should have a flu vaccine yearly and a tetanus vaccine every 10 years. All adults age 61 and older should receive a shingles vaccine once in their lifetime.    -A bone mass density test is recommended when a woman turns 65 to screen for osteoporosis. This test is only recommended one time, as a screening.  Some providers will use this same test as a disease monitoring tool if you already have osteoporosis. -All adults age 38-68 who are overweight should have a diabetes screening test once every three years.   -Other screening tests and preventive services for persons with diabetes include: an eye exam to screen for diabetic retinopathy, a kidney function test, a foot exam, and stricter control over your cholesterol.   -Cardiovascular screening for adults with routine risk involves an electrocardiogram (ECG) at intervals determined by your doctor.   -Colorectal cancer screenings should be done for adults age 54-65 with no increased risk factors for colorectal cancer. There are a number of acceptable methods of screening for this type of cancer. Each test has its own benefits and drawbacks. Discuss with your doctor what is most appropriate for you during your annual wellness visit. The different tests include: colonoscopy (considered the best screening method), a fecal occult blood test, a fecal DNA test, and sigmoidoscopy. -Breast cancer screenings are recommended every other year for women of normal risk, age 54-69.  -Cervical cancer screenings for women over age 72 are only recommended with certain risk factors.   -All adults born between [facility] should be screened once for Hepatitis C. Here is a list of your current Health Maintenance items (your personalized list of preventive services) with a due date:  Health Maintenance Due   Topic Date Due    Diabetic Foot Care  02/09/1963    Shingles Vaccine (1 of 2) 02/09/2003    Bone Mineral Density   02/09/2018    Albumin Urine Test  10/27/2018    Flu Vaccine  08/01/2019    Annual Well Visit  09/11/2019    Mammogram  10/31/2019            Advance Directives: Care Instructions  Your Care Instructions  An advance directive is a legal way to state your wishes at the end of your life. It tells your family and your doctor what to do if you can no longer say what you want.   There are two main types of advance directives. You can change them any time that your wishes change. · A living will tells your family and your doctor your wishes about life support and other treatment. · A durable power of  for health care lets you name a person to make treatment decisions for you when you can't speak for yourself. This person is called a health care agent. If you do not have an advance directive, decisions about your medical care may be made by a doctor or a  who doesn't know you. It may help to think of an advance directive as a gift to the people who care for you. If you have one, they won't have to make tough decisions by themselves. Follow-up care is a key part of your treatment and safety. Be sure to make and go to all appointments, and call your doctor if you are having problems. It's also a good idea to know your test results and keep a list of the medicines you take. How can you care for yourself at home? · Discuss your wishes with your loved ones and your doctor. This way, there are no surprises. · Many states have a unique form. Or you might use a universal form that has been approved by many states. This kind of form can sometimes be completed and stored online. Your electronic copy will then be available wherever you have a connection to the Internet. In most cases, doctors will respect your wishes even if you have a form from a different state. · You don't need a  to do an advance directive. But you may want to get legal advice. · Think about these questions when you prepare an advance directive:  ? Who do you want to make decisions about your medical care if you are not able to? Many people choose a family member or close friend. ? Do you know enough about life support methods that might be used? If not, talk to your doctor so you understand. ? What are you most afraid of that might happen?  You might be afraid of having pain, losing your independence, or being kept alive by machines. ? Where would you prefer to die? Choices include your home, a hospital, or a nursing home. ? Would you like to have information about hospice care to support you and your family? ? Do you want to donate organs when you die? ? Do you want certain [employer] practices performed before you die? If so, put your wishes in the advance directive. · Read your advance directive every year, and make changes as needed. When should you call for help? Be sure to contact your doctor if you have any questions. Where can you learn more? Go to http://lei-aleah.info/. Enter R264 in the search box to learn more about \"Advance Directives: Care Instructions. \"  Current as of: April 1, 2019  Content Version: 12.1  © 0414-2731 Healthwise, [employer]. Care instructions adapted under license by [employer] (which disclaims liability or warranty for this information). If you have questions about a medical condition or this instruction, always ask your healthcare professional. [place] [address] any warranty or liability for your use of this information.

## 2019-09-04 NOTE — PROGRESS NOTES
Chief Complaint   Patient presents with    Annual Wellness Visit    Inflammatory Bowel Disease         1. Have you been to the ER, urgent care clinic since your last visit? Hospitalized since your last visit no    2. Have you seen or consulted any other health care providers outside of the [address] [place] [address] since your last visit? Include any pap smears or colon screening. No     This is the Subsequent Medicare Annual Wellness Exam, performed 12 months or more after the Initial AWV or the last Subsequent AWV    I have reviewed the patient's medical history in detail and updated the computerized patient record. History     Past Medical History:   Diagnosis Date    Burning with urination     Charcot's joint disease due to secondary diabetes ([address] 2009    Diabetes ([address]     [facility]     Diabetic retinopathy ([facility]     H/O transfusion of packed red blood cells 2009    Hypercholesterolemia     Hypertension       Past Surgical History:   Procedure Laterality Date    COLONOSCOPY N/A 5/30/2018    COLONOSCOPY with biopsies [facility] by [facility]rick [facility]z [facility] HX CATARACT REMOVAL Bilateral 2003    HX COLONOSCOPY  2008    hx of polyps    HX ORTHOPAEDIC  2011    LT ANKLE-CHARCOT JOINT, metal renard placed    HX ORTHOPAEDIC      right ankle, metal renard placed    HX RETINAL DETACHMENT REPAIR Bilateral 2003     Current Outpatient Medications   Medication Sig Dispense Refill    ciprofloxacin HCl (CIPRO) 500 mg tablet Take 1 Tab by mouth two (2) times a day for 10 days. 20 Tab 0    carvedilol (COREG) 12.5 mg tablet Take 12.5 mg by mouth nightly.  carvedilol (COREG) 12.5 mg tablet Take 6.25 mg by mouth daily (with breakfast).  valsartan (DIOVAN) 80 mg tablet TAKE ONE TABLET BY MOUTH DAILY 90 Tab 0    furosemide (LASIX) 40 mg tablet TAKE ONE-HALF TABLET BY MOUTH TWICE A DAY 90 Tab 0    clopidogrel (PLAVIX) 75 mg tab Take 1 Tab by mouth daily.  30 Tab 2  doxazosin (CARDURA) 8 mg tablet Take 8 mg by mouth daily.  amLODIPine (NORVASC) 10 mg tablet Take 1 Tab by mouth daily. 30 Tab 0    isosorbide mononitrate ER (IMDUR) 30 mg tablet   1    atorvastatin (LIPITOR) 80 mg tablet Take 40 mg by mouth daily.  insulin glargine (TOUJEO SOLOSTAR) 300 unit/mL (1.5 mL) inpn 30 Units by SubCUTAneous route daily.  VITAMIN A PO Take 1 Tab by mouth daily.  black cohosh 540 mg cap Take 40 mg by mouth daily.  Cholecalciferol, Vitamin D3, (VITAMIN D3) 1,000 unit cap Take  by mouth.  ZINC ACETATE PO Take  by mouth.  BD SINGLE USE SWABS REGULAR padm       BD INSULIN SYRINGE ULTRA-FINE 1 mL 30 x 1/2\" syrg       cloNIDine HCl (CATAPRES) 0.3 mg tablet TAKE ONE TABLET BY MOUTH TWICE A DAY 48 Tab 0    metOLazone (ZAROXOLYN) 10 mg tablet Take 2.5 mg by mouth every other day.  hydrALAZINE (APRESOLINE) 100 mg tablet Take  by mouth two (2) times a day.  omega 3-dha-epa-fish oil (FISH OIL) 60- mg cap Take 500 mg by mouth two (2) times a day.  multivitamin (TAB-A-VINCENT) tablet Take 1 Tab by mouth daily.  metoprolol succinate (TOPROL XL) 50 mg XL tablet Take  by mouth daily.  glimepiride (AMARYL) 4 mg tablet Take  by mouth every morning.        No Known Allergies  Family History   Problem Relation Age of Onset    Hypertension Mother     Stroke Mother         TIA    Arthritis-osteo Mother     Cancer Mother         cytoblastoma    Hypertension Father     Diabetes Father     Heart Disease Father     Cancer Sister         colon    Diabetes Sister      Social History     Tobacco Use    Smoking status: Never Smoker    Smokeless tobacco: Never Used   Substance Use Topics    Alcohol use: Yes     Comment: rare     Patient Active Problem List   Diagnosis Code    Hypertension I10    Hyperlipidemia E78.5    Diabetic retinopathy associated with type 2 diabetes mellitus ([facility] E11.319    Diabetic peripheral neuropathy associated with type 2 diabetes mellitus (HCC) E11.42    Charcot's joint arthropathy in type 2 diabetes mellitus ([facility] E11.610    Mass of left breast on mammogram N63.20    ACP (advance care planning) Z71.89    Obesity, morbid ([facility] E66.01    Congest[facility] failure ([facility] I50.9       Depression Risk Factor Screening:     3 most recent PHQ Screens 7/10/2019   Little interest or pleasure in doing things Not at all   Feeling down, depressed, irritable, or hopeless Not at all   Total Score PHQ 2 0     Alcohol Risk Factor Screening: You do not drink alcohol or very rarely. Functional Ability and Level of Safety:   Hearing Loss  Pt does have reduced hearing in her left ear     Activities of Daily Living  The home contains: handrails, grab bars and uses walker  Patient does total self care    Fall Risk  Fall Risk Assessment, last 12 mths 7/10/2019   Able to walk? Yes   Fall in past 12 months? No       Abuse Screen  Patient is not abused    Cognitive Screening   Evaluation of Cognitive Function:  Has your family/caregiver stated any concerns about your memory: no  Normal    Patient Care Team   Patient Care Team:  Lance Almazan MD as PCP - General (Family Practice)  Onelia Vasquez DPM (Podiatry)  Deandre Lovett MD (Ophthalmology)  Artis Cole MD (Ophthalmology)  Antonio Vera MD (Endocrinology)  Beverly Owens RN as Ambulatory Care Navigator  Baldomero Manning MD as Physician (Vascular Surgery)  Lily Dumont MD as Surgeon (Colon and Rectal Surgery)    Assessment/Plan   Education and counseling provided:  Are appropriate based on today's review and evaluation    Diagnoses and all orders for this visit:    1. Medicare annual wellness visit, subsequent  -     DEXA BONE DENSITY STUDY AXIAL; Future    2. Breast cancer screening  -     WHITNEY MAMMO BI SCREENING INCL CAD; Future    3.  ACP (advance care planning)        Health Maintenance Due   Topic Date Due    FOOT EXAM Q1  02/09/1965    Shingrix Vaccine Age 50> (1 of 2) 02/09/2003    Bone Densitometry (Dexa) Screening  02/09/2018    MICROALBUMIN Q1  10/27/2018    Influenza Age 5 to Adult  08/01/2019    MEDICARE YEARLY EXAM  09/11/2019    BREAST CANCER SCRN MAMMOGRAM  10/31/2019     Bren Quevedo MD

## 2019-09-04 NOTE — ACP (ADVANCE CARE PLANNING)
Advance Care Planning (ACP) Provider Conversation Snapshot    Date of ACP Conversation: 09/04/19  Persons included in Conversation:  patient  Length of ACP Conversation in minutes:  16 minutes    Authorized Decision Maker (if patient is incapable of making informed decisions):    This person is:   Healthcare Agent/Medical Power of  under Advance Directive            For Patients with Decision Making Capacity:   Values/Goals: Exploration of values, goals, and preferences if recovery is not expected, even with continued medical treatment in the event of:  Imminent death  Severe, permanent brain injury    Conversation Outcomes / Follow-Up Plan:   Recommended completion of Advance Directive form after review of ACP materials and conversation with prospective healthcare agent

## 2020-05-19 ENCOUNTER — TELEPHONE (OUTPATIENT)
Dept: FAMILY MEDICINE CLINIC | Age: 65
End: 2020-05-19

## 2020-05-19 DIAGNOSIS — Z01.818 PREOP TESTING: Primary | ICD-10-CM

## 2020-05-19 NOTE — TELEPHONE ENCOUNTER
Returned call to patient and spoke with her , Elena Harris (HIPAA verified-all patient identifiers verified) to advise order has been placed for patient's TRTRQ-80 testing. He verbalized understanding; stated he and his wife are still at the lab and will let lab know order is available.

## 2020-05-19 NOTE — TELEPHONE ENCOUNTER
Patient registrar stated patient needs an order for COVID-19 testing prior to her dialysis graft procedure. The procedure is scheduled for 5/22/2020 with Dr. Bella Reed. Registrar will need order prior to 11:15AM, so patient can be tested before close today (testing closes at 12PM). Patient and registrar are aware I will route a message to Dr. Rogerio Corona and will contact patient with his response. Patient would like return call to [phone].

## 2020-06-08 LAB — HBA1C MFR BLD HPLC: 6.5 %

## 2020-06-16 ENCOUNTER — TELEPHONE (OUTPATIENT)
Dept: FAMILY MEDICINE CLINIC | Age: 65
End: 2020-06-16

## 2020-06-16 ENCOUNTER — VIRTUAL VISIT (OUTPATIENT)
Dept: FAMILY MEDICINE CLINIC | Age: 65
End: 2020-06-16

## 2020-06-16 DIAGNOSIS — Z86.73 HISTORY OF CVA (CEREBROVASCULAR ACCIDENT): ICD-10-CM

## 2020-06-16 DIAGNOSIS — I50.40 COMBINED SYSTOLIC AND DIASTOLIC CONGESTIVE HEART FAILURE, UNSPECIFIED HF CHRONICITY (HCC): ICD-10-CM

## 2020-06-16 DIAGNOSIS — I10 ESSENTIAL HYPERTENSION: Primary | ICD-10-CM

## 2020-06-16 DIAGNOSIS — Z86.73 HISTORY OF CVA (CEREBROVASCULAR ACCIDENT): Primary | ICD-10-CM

## 2020-06-16 DIAGNOSIS — E11.42 DIABETIC PERIPHERAL NEUROPATHY ASSOCIATED WITH TYPE 2 DIABETES MELLITUS (HCC): ICD-10-CM

## 2020-06-16 DIAGNOSIS — E78.49 OTHER HYPERLIPIDEMIA: ICD-10-CM

## 2020-06-16 DIAGNOSIS — R13.0 APHAGIA: ICD-10-CM

## 2020-06-16 RX ORDER — ISOSORBIDE MONONITRATE 30 MG/1
30 TABLET, EXTENDED RELEASE ORAL
Qty: 90 TAB | Refills: 3 | Status: SHIPPED | OUTPATIENT
Start: 2020-06-16 | End: 2021-03-16

## 2020-06-16 RX ORDER — CLOPIDOGREL BISULFATE 75 MG/1
75 TABLET ORAL DAILY
Qty: 90 TAB | Refills: 3 | Status: SHIPPED | OUTPATIENT
Start: 2020-06-16 | End: 2021-06-10 | Stop reason: SDUPTHER

## 2020-06-16 RX ORDER — CLONIDINE HYDROCHLORIDE 0.3 MG/1
0.3 TABLET ORAL 2 TIMES DAILY
Qty: 180 TAB | Refills: 3 | Status: SHIPPED | OUTPATIENT
Start: 2020-06-16 | End: 2021-03-16

## 2020-06-16 RX ORDER — AMLODIPINE BESYLATE 10 MG/1
10 TABLET ORAL DAILY
Qty: 30 TAB | Refills: 0 | Status: SHIPPED | OUTPATIENT
Start: 2020-06-16 | End: 2020-12-16

## 2020-06-16 RX ORDER — CARVEDILOL 12.5 MG/1
25 TABLET ORAL 2 TIMES DAILY
Qty: 180 TAB | Refills: 3 | Status: SHIPPED | OUTPATIENT
Start: 2020-06-16 | End: 2021-03-16

## 2020-06-16 RX ORDER — DOXAZOSIN 8 MG/1
4 TABLET ORAL
Qty: 90 TAB | Refills: 3 | Status: SHIPPED | OUTPATIENT
Start: 2020-06-16 | End: 2021-03-16

## 2020-06-16 RX ORDER — PEN NEEDLE, DIABETIC 30 GX3/16"
NEEDLE, DISPOSABLE MISCELLANEOUS
Qty: 100 PACKAGE | Refills: 3 | Status: SHIPPED | OUTPATIENT
Start: 2020-06-16 | End: 2021-06-10 | Stop reason: SDUPTHER

## 2020-06-16 RX ORDER — ATORVASTATIN CALCIUM 80 MG/1
40 TABLET, FILM COATED ORAL DAILY
Qty: 90 TAB | Refills: 3 | Status: SHIPPED | OUTPATIENT
Start: 2020-06-16 | End: 2021-06-10

## 2020-06-16 NOTE — PROGRESS NOTES
Cipriano Beasley is a 77 y.o. female who was seen by synchronous (real-time) audio-video technology on 6/16/2020. Consent: Cipriano Beasley, who was seen by synchronous (real-time) audio-video technology, and/or her healthcare decision maker, is aware that this patient-initiated, Telehealth encounter on 6/16/2020 is a billable service, with coverage as determined by her insurance carrier. She is aware that she may receive a bill and has provided verbal consent to proceed: Yes. Assessment & Plan:   Diagnoses and all orders for this visit:    1. Essential hypertension  -     carvediloL (Coreg) 12.5 mg tablet; Take 2 Tabs by mouth two (2) times a day. -     amLODIPine (Norvasc) 10 mg tablet; Take 1 Tab by mouth daily. -     cloNIDine HCL (CATAPRES) 0.3 mg tablet; Take 1 Tab by mouth two (2) times a day. -     clopidogreL (PLAVIX) 75 mg tab; Take 1 Tab by mouth daily. 2. History of CVA (cerebrovascular accident)  -     doxazosin (Cardura) 8 mg tablet; Take 0.5 Tabs by mouth nightly. 3. Other hyperlipidemia  -     atorvastatin (LIPITOR) 80 mg tablet; Take 0.5 Tabs by mouth daily. 4. Combined systolic and diastolic congestive heart failure, unspecified HF chronicity (HCC)  -     isosorbide mononitrate ER (IMDUR) 30 mg tablet; Take 1 Tab by mouth every morning. 5. Diabetic peripheral neuropathy associated with type 2 diabetes mellitus (HCC)  -     Insulin Needles, Disposable, 31 gauge x 5/16\" ndle; Use with pen daily        I spent at least 30 minutes on this visit with this established patient. 712  Subjective:   Cipriano Beasley is a 77 y.o. female who was seen for No chief complaint on file. Prior to Admission medications    Medication Sig Start Date End Date Taking? Authorizing Provider   carvedilol (COREG) 12.5 mg tablet Take 12.5 mg by mouth nightly. Provider, Historical   carvedilol (COREG) 12.5 mg tablet Take 6.25 mg by mouth daily (with breakfast).     Provider, Historical   valsartan (DIOVAN) 80 mg tablet TAKE ONE TABLET BY MOUTH DAILY 4/9/19   Evelia Saldana MD   furosemide (LASIX) 40 mg tablet TAKE ONE-HALF TABLET BY MOUTH TWICE A DAY 4/3/19   Evelia Saldana MD   cloNIDine HCl (CATAPRES) 0.3 mg tablet TAKE ONE TABLET BY MOUTH TWICE A DAY 2/12/19   Evelia Saldana MD   clopidogrel (PLAVIX) 75 mg tab Take 1 Tab by mouth daily. 9/17/18   Evelia Saldana MD   doxazosin (CARDURA) 8 mg tablet Take 8 mg by mouth daily. Provider, Historical   amLODIPine (NORVASC) 10 mg tablet Take 1 Tab by mouth daily. 6/8/18   Evelia Saldana MD   isosorbide mononitrate ER (IMDUR) 30 mg tablet  4/4/18   Provider, Historical   metOLazone (ZAROXOLYN) 10 mg tablet Take 2.5 mg by mouth every other day. Provider, Historical   hydrALAZINE (APRESOLINE) 100 mg tablet Take  by mouth two (2) times a day. Provider, Historical   atorvastatin (LIPITOR) 80 mg tablet Take 40 mg by mouth daily. Provider, Historical   insulin glargine (TOUJEO SOLOSTAR) 300 unit/mL (1.5 mL) inpn 30 Units by SubCUTAneous route daily. 12/13/17   Provider, Historical   omega 3-dha-epa-fish oil (FISH OIL) 60- mg cap Take 500 mg by mouth two (2) times a day. Provider, Historical   multivitamin (TAB-A-VINCENT) tablet Take 1 Tab by mouth daily. Provider, Historical   VITAMIN A PO Take 1 Tab by mouth daily. Provider, Historical   black cohosh 540 mg cap Take 40 mg by mouth daily. Provider, Historical   metoprolol succinate (TOPROL XL) 50 mg XL tablet Take  by mouth daily. Provider, Historical   glimepiride (AMARYL) 4 mg tablet Take  by mouth every morning. Provider, Historical   Cholecalciferol, Vitamin D3, (VITAMIN D3) 1,000 unit cap Take  by mouth. Provider, Historical   ZINC ACETATE PO Take  by mouth.     Provider, Historical   BD SINGLE USE SWABS REGULAR padm  6/27/14   Provider, Historical   BD INSULIN SYRINGE ULTRA-FINE 1 mL 30 x 1/2\" syrg  5/20/14   Provider, Historical     No Known Allergies    Patient Active Problem List   Diagnosis Code    Hypertension I10    Hyperlipidemia E78.5    Diabetic retinopathy associated with type 2 diabetes mellitus ([facility] E11.319    Diabetic peripheral neuropathy [facility] with type [facility] mellitus [facility] E11.42    Charcot's joint arthropathy in type 2 diabetes mellitus ([facility] E11.610    Mass of left breast on mammogram N63.20    ACP (advance care planning) Z71.89    Obesity, morbid ([facility] E66.01    Congestive heart failure ([facility] I50.9     Past Medical History:   Diagnosis Date    Burning with urination     Charcot's joint disease due to secondary diabetes ([facility] 2009    Diabetes ([facility]     Di[facility]     Diabetic retinopathy ([facility]     H/O transfusion of packed red blood cells 2009    Hypercholesterolemia     Hypertension        Review of Systems   Constitutional: Negative for chills and fever. Respiratory: Negative for cough and shortness of breath. Cardiovascular: Negative for chest pain and palpitations. Gastrointestinal: Negative for nausea and vomiting. Genitourinary: Negative. Neurological: Positive for speech change (slowed speech). Negative for dizziness, tingling and headaches. Psychiatric/Behavioral: Negative for depression, hallucinations, memory loss, substance abuse and suicidal ideas. The patient is not nervous/anxious and does not have insomnia. Objective: There were no vitals taken for this visit. General: alert, cooperative, no distress   Mental  status: normal mood, behavior, speech, dress, motor activity, and thought processes, able to follow commands   HENT: NCAT   Neck: no visualized mass   Resp: no respiratory distress   Neuro: no gross deficits   Skin: no discoloration or lesions of concern on visible areas   Psychiatric: normal affect, consistent with stated mood, no evidence of hallucinations     Additional exam findings:        We discussed the expected course, resolution and complications of the diagnosis(es) in detail. Medication risks, benefits, costs, interactions, and alternatives were discussed as indicated. I advised her to contact the office if her condition worsens, changes or fails to improve as anticipated. She expressed understanding with the diagnosis(es) and plan. Everardo Camarena is a 77 y.o. female who was evaluated by a video visit encounter for concerns as above. Patient identification was verified prior to start of the visit. A caregiver was present when appropriate. Due to this being a TeleHealth encounter (During [facility]- public health emergency), evaluation of the following organ systems was limited: Vitals/Constitutional/EENT/Resp/CV/GI//MS/Neuro/Skin/Heme-Lymph-Imm. Pursuant to the emergency declaration under the [facility] waiver authority and the [employer] and [employer] General Act, this Virtual  Visit was conducted, with patient's (and/or legal guardian's) consent, to reduce the patient's risk of exposure to COVID-19 and provide necessary medical care. Services were provided through a video synchronous discussion virtually via doxy. me to substitute for in-person clinic visit. Patient and provider were located at their individual homes.       Alex Nava MD

## 2020-07-21 ENCOUNTER — APPOINTMENT (OUTPATIENT)
Dept: PHYSICAL THERAPY | Age: 65
End: 2020-07-21

## 2020-07-22 ENCOUNTER — APPOINTMENT (OUTPATIENT)
Dept: PHYSICAL THERAPY | Age: 65
End: 2020-07-22

## 2020-07-24 ENCOUNTER — HOSPITAL ENCOUNTER (OUTPATIENT)
Dept: PHYSICAL THERAPY | Age: 65
Discharge: HOME | End: 2020-07-24
Payer: MEDICARE

## 2020-07-24 PROCEDURE — 92523 SPEECH SOUND LANG COMPREHEN: CPT

## 2020-07-24 PROCEDURE — 92507 TX SP LANG VOICE COMM INDIV: CPT

## 2020-07-24 NOTE — PROGRESS NOTES
ST DAILY TREATMENT NOTE    Patient Name: Olivia Mcleod  Date:2020  : 1955  [x]  Patient  Verified  Payor: Jaylin Sousa / Plan: [place] MEDICARE PART A & B / Product Type: Medicare /   In time: 2:05  Out time: 2:45  Total Treatment Time (min): 40  Visit #: 1 of 8    SUBJECTIVE  Pain Level (0-10 scale): 0  Any medication changes, allergies to medications, adverse drug reactions, diagnosis change, or new procedure performed?: [x] No    [] Yes (see summary sheet for update)  Subjective functional status/changes:   [] No changes reported  CVA in 2020. Pt complains of delayed speech and taking a longer think and form words. Patient reports when she young she had a stutter but not as an adult. Pt denies any changes in swallowing and denies coughing during meals. Date of Onset: 2020  Social History: retired; lives with    Prior Functional level: [facility] for speech, cognition, and swallowing    OBJECTIVE    OUTPATIENT SPEECH-LANGUAGE EVALUATION    Receptive Language:  Receptive vocabulary    [x] WNL    [] Impaired [] Mild [] Mod [] Severe  Reliability of yes/no responses to questions [x] WNL    [] Impaired [] Mild [] Mod [] Severe Reliability of responses to complex ideation [x] WNL    [] Impaired [] Mild [] Mod [] Severe  Auditory retention/processing   [x] WNL    [] Impaired [] Mild [] Mod [] Severe  Follow commands [x] 1 Step [x] 2 Step [x] 3 Step [] complex  Objective Information:  Pt denies changes in receptive language. Patient follows sequential commands without error. She answers yes/no questions without error. Patient answers questions appropriately. Receptive language is [facility].      Expressive Language  Automatic speech   [x] WNL    [] Impaired [] Mild [] Mod [] Severe  Able to identify objects/pictures  [x] WNL    [] Impaired [] Mild [] Mod [] Severe  Preservations    [x] WNL    [] Impaired [] Mild [] Mod [] Severe  Word retrieval    [] WNL    [x] Impaired [x] Mild [] Mod [] Severe  Paraphasias   []None[] Literal    [x] Phenomic   [] Jargon   [x] Semantic   Able to make needs known at level [] Word   [] Phrase   [x] Sentence   [] Gesture        [] Unable   Objective Information:   Western Aphasia Battery- Revised administered with patient scoring 96/100 in naming and word finding subtest, 19/20 in spontaneous speech subtest. Additionally informal measures completed for higher level expression tasks. Patient demonstrating mild hesitations and halting speech with occasional semantic paraphasias noted. Patient frequently independently recovers from word finding difficulty. Writing: [x] L or [] R [] WNL    [] Impaired @ [] Word [] Sentence [] Paragraph    Reading:  [x] WNL    [] Impaired @ [] Word [] Sentence [] Paragraph     Cognition:  Attention: [x] Alert    [] Drowsy  Orientation: [x] Person   [x] Place    [x] Time  Memory: [x] WNL    [] Impaired ST   [] Impaired LT  Comments:   Patient denies memory changes since recent CVA. Executive Functions:  Problem Solving: [x] WNL     [] Impaired [] Mild [] Mod [] Severe  Neglect:  [x] None    [] Left   [] Right  Self-regulation  [x] Appropriate   [] Impulsive   [] Requires verbal cues  Awareness:  [] Poor initiation   [] Disinhibition   [] Constant supervision        Speech:  Oral Verbal Apraxia: [x] None    [] Mild    [] Moderate    [] Severe   Dysarthria:  [x] None    [] Mild    [] Moderate    [] Severe   Intelligibility:  [x] WNL    [] Words   [] Phrases   [] Sentences   [] Conversation      % Intelligible: 100%   Voice:   [x] WNL    [] Hoarse   [] Breathy   Comments:  Fluency:  [x] WNL    [] Dysfluent:        Patient/Caregiver instruction/education: [] Review HEP    [] Progressed/Changed    HEP/Handouts given:   Pain Level (0-10 scale) post treatment: 0      ASSESSMENT  [x]  See Plan of Care    Short Term Goals: To be accomplished in 3 weeks  [x]  See Plan of Care    Long Term Goals:  To be accomplished in 4 weeks   [x]  See Plan of Care     PLAN  [x]  Upgrade activities as tolerated     []  Continue plan of care  []  Discharge due to:__  [x] Other: tx initiated     Salvatroe Beasley SLP 7/24/2020  2:12 PM

## 2020-07-24 NOTE — PROGRESS NOTES
[facility]  [place] [employer]  [address] [facility]  [phone] fax    Plan of Care/ Statement of Necessity for Speech Therapy Services    Patient name: Roby Pendleton Start of Care: 2020   Referral source: Chely Pollard MD : 1955    Medical Diagnosis: Aphagia [R13.0]  Payor: Nahum Andres / Plan: [place] MEDICARE PART A & B / Product Type: Medicare /  Onset Date: 2020    Treatment Diagnosis:  Aphasia [R47.01]   Prior Hospitalization: see medical history Provider#: [id number]   Medications: Verified on Patient summary List    Comorbidities: HTN, diabetes, hx CVA     Prior Level of Function: per patient WNL for speech, cognition, and swallowing    The Plan of Care and following information is based on the information from the initial evaluation. Assessment/ key information:   Patient is a 76 y/o female referred to outpatient speech therapy evaluation s/p CVA . She reports changes in speech following CVA, however has noticed recent improvement. Pt complains of delayed speech/word finding difficulties. Patient denies difficulty swallowing and denies coughing/throat clearing during meals. Pt participated in formal assessment: Western Battery of Aphasia- Revised yielding the following results:     Western Aphasia Battery-R (WAB-R) -   Pt's aphasia quotient AQ score: 96.3/100   Spontaneous Speech Total = 19/20  A.) Informational Content = 10/10  B.) Fluency, Grammatical Competence, and Paraphasias = 9/10   Auditory Verbal Comprehension Total = 199/200  A.) Yes/No Questions = 60/60  B.) Auditory Word Recognition = 59/60  C.) Sequential Commands = 80/80   Repetition = 96/100   Naming and Word Finding Total = 96/100  A.) Object Naming = 58/60  B.) Word Fluency = 18/20  C.) Sentence Completion = 10/10  D.) Responsive Speech = 10/10      Additionally informal measures completed for higher level verbal expression.  Patient demonstrating mild hesitations and halting speech with occasional semantic paraphasias noted. Patient frequently independently recovers from word finding difficulty. Pt presents with mild expressive aphasia c/b halting speech during higher level language tasks, slowed speech rate, and semantic and phonemic paraphasias. Skilled speech-language therapy services are medically necessary to improve language deficits described above to increase her ability to effectively communicate with others, participate in ADL tasks, and improve overall QOL. Problem List:    [x]aphasic  []dysarthric  []dysphagic       []alexic  []agraphic  []dysphonia       []dysfluency   []Cognitive-Linguistic Disorder       []other   Treatment Plan may include any combination of the following: Aphasia Treatment and Patient Education    Patient / Family readiness to learn indicated by: asking question  Persons(s) to be included in education: patient   Barriers to Learning/Limitations: none   Patient Goal (s): \"Speak better\"  Patient Self Reported Health Status: fair  Rehabilitation Potential: good  Short Term Goals: To be accomplished in 3weeks  1) Patient will describe common objects with 4-6 attributes with >90% accuracy and and provide 2+similarities and 2+ differences among 2 items without a visual when provided with min cues in order to improve verbal expression/ word retrieval skills. 2) Patient will perform higher level verbal expression tasks such as description, answering problem solving, giving opinions  definitions, sequencing, reasoning/ why questions  with >90% accuracy with min cues in order to improve his word finding and verbal expression skills. 3) Patient will name 12-15 words in abstract categories >90% accuracy when provided with min cues to improve word retrieval and verbal fluency. Long Term Goals:  To be accomplished in 4 weeks   1) Patient will improve her verbal expression skills at the conversational level with >95% accuracy with S in order to express her thoughts to enhance functional communication with communication partners more effectively with increased accuracy and decrease in hesitations. Frequency / Duration: Patient to be seen 1-2 times per week for 4 weeks:    Patient/ Caregiver education and instruction: Diagnosis, prognosis, Compensatory Techniques and Exercises    Certification Period: 7/24/2020 - 8/22/2020     JOE Puckett 7/24/2020 2:42 PM  MS CCC-SLP  Speech-Language Pathologist     ________________________________________________________________________    I certify that the above Therapy Services are being furnished while the patient is under my care. I agree with the treatment plan and certify that this therapy is necessary.     Physician's Signature:____________Date:_________TIME:________    ** Signature, Date and Time must be completed for valid certification **    Please sign and return to [facility]  [facility] [employer] [facility]  [address] [place] [address]  [phone] fax     Thank you

## 2020-07-31 ENCOUNTER — APPOINTMENT (OUTPATIENT)
Dept: PHYSICAL THERAPY | Age: 65
End: 2020-07-31
Payer: MEDICARE

## 2020-08-21 ENCOUNTER — TELEPHONE (OUTPATIENT)
Dept: FAMILY MEDICINE CLINIC | Age: 65
End: 2020-08-21

## 2020-08-21 DIAGNOSIS — E11.42 DIABETIC PERIPHERAL NEUROPATHY ASSOCIATED WITH TYPE 2 DIABETES MELLITUS (HCC): Primary | ICD-10-CM

## 2020-08-21 NOTE — TELEPHONE ENCOUNTER
Patient is requesting a Rx for Accu-chek Mariel test strips sent to [address] [place] [address] in [place]. She states her endocrinologist, Dr. Cooper Kellogg, has retired and specialist office will forward records and transfer care to Dr. Margie Hong.

## 2020-08-24 RX ORDER — BLOOD SUGAR DIAGNOSTIC
STRIP MISCELLANEOUS
Qty: 100 STRIP | Refills: 2 | Status: SHIPPED | OUTPATIENT
Start: 2020-08-24

## 2020-09-02 NOTE — PROGRESS NOTES
[facility]  [address] [facility]  [phone] fax    Speech Therapy Discharge Summary    Patient name: Madyson Oakley Start of Care: 2020   Referral source: Patricia Power MD : 1955               Medical Diagnosis: Aphagia [R13.0]  Payor: Cabrera Bolivar / Plan: [place] MEDICARE PART A & B / Product Type: Medicare /  Onset Date: 2020    Treatment Diagnosis:  Aphasia [R47.01]   Prior Hospitalization: see medical history Provider#: [id number]   Medications: Verified on Patient summary List    Comorbidities: HTN, diabetes, hx CVA     Prior Level of Function: per patient WNL for speech, cognition, and swallowing  Visits from Start of Care: 1  Missed Visits: 1    Reporting Period : 2020 to 2020     Summary of Care:  Goal: 1) Patient will describe common objects with 4-6 attributes with >90% accuracy and and provide 2+similarities and 2+ differences among 2 items without a visual when provided with min cues in order to improve verbal expression/ word retrieval skills. Status at last note/certification: established at initial evaluation yielding mild expressive aphasia   Status at discharge: not met, no change in status - evaluation only     Goal: 2) Patient will perform higher level verbal expression tasks such as description, answering problem solving, giving opinions  definitions, sequencing, reasoning/ why questions  with >90% accuracy with min cues in order to improve his word finding and verbal expression skills. Status at last note/certification:established at initial evaluation yielding mild expressive aphasia   Status at discharge: not met, no change in status - evaluation only     Goal: 3) Patient will name 12-15 words in abstract categories >90% accuracy when provided with min cues to improve word retrieval and verbal fluency.   Status at last note/certification: established at initial evaluation yielding mild expressive aphasia   Status at discharge: not met, no change in status - evaluation only     LTG Goal: 1) Patient will improve her verbal expression skills at the conversational level with >95% accuracy with S in order to express her thoughts to enhance functional communication with communication partners more effectively with increased accuracy and decrease in hesitations. Status at last note/certification: established at initial evaluation yielding mild expressive aphasia   Status at discharge: not met, no change in status - evaluation only       ASSESSMENT:   Patient did not return following initial evaluation. No change in status. Please re-consult if needed per MD discretion.      RECOMMENDATIONS:  [x]Discontinue therapy: []Patient has reached or is progressing toward set goals      [x]Patient is non-compliant or has abdicated      []Due to lack of appreciable progress towards set JOE Sparks 9/2/2020 1:05 PM  MS CCC-SLP  Speech-Language Pathologist

## 2020-12-02 ENCOUNTER — TELEPHONE (OUTPATIENT)
Dept: FAMILY MEDICINE CLINIC | Age: 65
End: 2020-12-02

## 2020-12-02 NOTE — TELEPHONE ENCOUNTER
Called patient again. Informed her we do, I haven't been told otherwise. However, it would be wise to contact that insurance to make sure plan is in network with [employer] and/or our provider.

## 2020-12-02 NOTE — TELEPHONE ENCOUNTER
Patient called the office stating she is doing her enrollment for medicare and has the option this year to sign up for a [address] with Medicare and wants to know which ones Dr. Subha Meyer accepts. Please call patient back at [phone] or [phone].

## 2020-12-15 DIAGNOSIS — I10 ESSENTIAL HYPERTENSION: ICD-10-CM

## 2020-12-16 ENCOUNTER — TELEPHONE (OUTPATIENT)
Dept: FAMILY MEDICINE CLINIC | Age: 65
End: 2020-12-16

## 2020-12-16 RX ORDER — AMLODIPINE BESYLATE 10 MG/1
TABLET ORAL
Qty: 30 TAB | Refills: 0 | Status: SHIPPED | OUTPATIENT
Start: 2020-12-16 | End: 2021-06-10 | Stop reason: SDUPTHER

## 2020-12-16 NOTE — TELEPHONE ENCOUNTER
[facility] pharmacy  faxed over request for the following  New prescriptions to be filled:    Medication requested: Rosuvastatin , was not on Patient Med list.Please review and advise. Patient can be contacted at [phone] for any questions.

## 2020-12-18 NOTE — TELEPHONE ENCOUNTER
I didn't see this on pt's med list. However, I am wondering if her insurance company recommended her for statin treatment? Do you remember signing one of those forms for her? Doesn't look like she's had a lipid done recently, and the last one she had was done with Dr. Marcelle Hashimoto.

## 2021-01-19 ENCOUNTER — VIRTUAL VISIT (OUTPATIENT)
Dept: FAMILY MEDICINE CLINIC | Age: 66
End: 2021-01-19
Payer: MEDICARE

## 2021-01-19 DIAGNOSIS — I50.22 CHRONIC SYSTOLIC CONGESTIVE HEART FAILURE (HCC): ICD-10-CM

## 2021-01-19 DIAGNOSIS — Z00.00 MEDICARE ANNUAL WELLNESS VISIT, SUBSEQUENT: ICD-10-CM

## 2021-01-19 DIAGNOSIS — F41.9 ANXIETY: Primary | ICD-10-CM

## 2021-01-19 DIAGNOSIS — E11.42 DIABETIC PERIPHERAL NEUROPATHY ASSOCIATED WITH TYPE 2 DIABETES MELLITUS (HCC): ICD-10-CM

## 2021-01-19 PROCEDURE — G8536 NO DOC ELDER MAL SCRN: HCPCS | Performed by: FAMILY MEDICINE

## 2021-01-19 PROCEDURE — 3046F HEMOGLOBIN A1C LEVEL >9.0%: CPT | Performed by: FAMILY MEDICINE

## 2021-01-19 PROCEDURE — G0439 PPPS, SUBSEQ VISIT: HCPCS | Performed by: FAMILY MEDICINE

## 2021-01-19 PROCEDURE — 99213 OFFICE O/P EST LOW 20 MIN: CPT | Performed by: FAMILY MEDICINE

## 2021-01-19 PROCEDURE — G8421 BMI NOT CALCULATED: HCPCS | Performed by: FAMILY MEDICINE

## 2021-01-19 PROCEDURE — G8427 DOCREV CUR MEDS BY ELIG CLIN: HCPCS | Performed by: FAMILY MEDICINE

## 2021-01-19 PROCEDURE — 1101F PT FALLS ASSESS-DOCD LE1/YR: CPT | Performed by: FAMILY MEDICINE

## 2021-01-19 PROCEDURE — G8432 DEP SCR NOT DOC, RNG: HCPCS | Performed by: FAMILY MEDICINE

## 2021-01-19 PROCEDURE — 1090F PRES/ABSN URINE INCON ASSESS: CPT | Performed by: FAMILY MEDICINE

## 2021-01-19 PROCEDURE — G8400 PT W/DXA NO RESULTS DOC: HCPCS | Performed by: FAMILY MEDICINE

## 2021-01-19 PROCEDURE — 2022F DILAT RTA XM EVC RTNOPTHY: CPT | Performed by: FAMILY MEDICINE

## 2021-01-19 PROCEDURE — G8756 NO BP MEASURE DOC: HCPCS | Performed by: FAMILY MEDICINE

## 2021-01-19 PROCEDURE — 3017F COLORECTAL CA SCREEN DOC REV: CPT | Performed by: FAMILY MEDICINE

## 2021-01-19 RX ORDER — FLASH GLUCOSE SENSOR
KIT MISCELLANEOUS
Qty: 1 KIT | Refills: 0 | Status: SHIPPED | OUTPATIENT
Start: 2021-01-19 | End: 2021-01-29

## 2021-01-19 RX ORDER — CITALOPRAM 20 MG/1
20 TABLET, FILM COATED ORAL DAILY
Qty: 30 TAB | Refills: 1 | Status: SHIPPED | OUTPATIENT
Start: 2021-01-19 | End: 2021-03-16

## 2021-01-19 RX ORDER — FLASH GLUCOSE SCANNING READER
EACH MISCELLANEOUS
Qty: 1 EACH | Refills: 0 | Status: SHIPPED | OUTPATIENT
Start: 2021-01-19 | End: 2021-01-29

## 2021-01-19 NOTE — PROGRESS NOTES
Cheryle Petit is a 77 y.o. female who was seen by synchronous (real-time) audio-video technology on 1/19/2021 for Follow-up (no new chest pains or SOB. she feels fatigued), Diabetes (wants diabetic reader), and Annual Wellness Visit        Assessment & Plan:   Diagnoses and all orders for this visit:    1. Anxiety  -     citalopram (CELEXA) 20 mg tablet; Take 1 Tab by mouth daily. 2. Medicare annual wellness visit, subsequent    3. Chronic systolic congestive heart failure ([address]    4. Diabetic peripheral neuropathy associated with type 2 diabetes mellitus (HCC)  -     flash glucose scanning reader (FreeStyle Griselda 14 Day [place]) misc; Use qid as directed  -     flash glucose sensor (FreeStyle Griselda 14 Day Sensor) kit; Use as directed          712  Subjective:       Prior to Admission medications    Medication Sig Start Date End Date Taking? Authorizing Provider   amLODIPine (NORVASC) 10 mg tablet TAKE 1 TABLET EVERY DAY 12/16/20   Roberto Khalil MD   glucose blood VI test strips (Accu-Chek Mariel Plus test strp) strip Use bid 8/24/20   Roberto Khalil MD   carvediloL (Coreg) 12.5 mg tablet Take 2 Tabs by mouth two (2) times a day. 6/16/20   Roberto Khalil MD   atorvastatin (LIPITOR) 80 mg tablet Take 0.5 Tabs by mouth daily. 6/16/20   Roberto Khalil MD   cloNIDine HCL (CATAPRES) 0.3 mg tablet Take 1 Tab by mouth two (2) times a day. 6/16/20   Roberto Khalil MD   clopidogreL (PLAVIX) 75 mg tab Take 1 Tab by mouth daily. 6/16/20   Roberto Khalil MD   doxazosin (Cardura) 8 mg tablet Take 0.5 Tabs by mouth nightly. 6/16/20   Roberto Khalil MD   isosorbide mononitrate ER (IMDUR) 30 mg tablet Take 1 Tab by mouth every morning. 6/16/20   Roberto Khalil MD   Insulin Needles, Disposable, 31 gauge x 5/16\" ndle Use with pen daily 6/16/20   Roberto Khalil MD   insulin glargine (TOUJEO SOLOSTAR) 300 unit/mL (1.5 mL) inpn 30 Units by SubCUTAneous route daily.  12/13/17   Provider, Les   omega 3-dha-epa-fish oil (FISH OIL) 60- mg cap Take 500 mg by mouth two (2) times a day. Provider, Historical   multivitamin (TAB-A-VINCENT) tablet Take 1 Tab by mouth daily. Provider, Historical   VITAMIN A PO Take 1 Tab by mouth daily. Provider, Historical   black cohosh 540 mg cap Take 40 mg by mouth daily. Provider, Historical   Cholecalciferol, Vitamin D3, (VITAMIN D3) 1,000 unit cap Take  by mouth. Provider, Historical   ZINC ACETATE PO Take  by mouth. Provider, Historical   BD SINGLE USE SWABS REGULAR padm  6/27/14   Provider, Historical   BD INSULIN SYRINGE ULTRA-FINE 1 mL 30 x 1/2\" syrg  5/20/14   Provider, Historical     Patient Active Problem List   Diagnosis Code    Hypertension I10    Hyperlipidemia E78.5    Diabetic retinopathy associated with type 2 diabetes mellitus ([address] E11.319    Diabetic peripheral neuropathy associated with type 2 diabetes mellitus ([address] E11.42    Charcot's joint arthropathy in type 2 diabetes mellitus ([address] E11.610    Mass of left breast on mammogram N63.20    ACP (advance care planning) Z71.89    Obesity, morbid ([address] E66.01    Congestive heart failure ([address] I50.9     Past Medical History:   Diagnosis Date    Burning with urination     Charcot's joint disease due to secondary diabetes ([address] 2009    Diabetes ([address]     Diabetic peripheral neuropathy ([address]     Diabetic retinopathy ([address]     H/O transfusion of packed red blood cells 2009    Hypercholesterolemia     Hypertension        Review of Systems   Constitutional: Positive for malaise/fatigue. Negative for chills and fever. Respiratory: Positive for shortness of breath. Negative for cough. Cardiovascular: Negative for chest pain and palpitations. Genitourinary: Negative. Neurological: Negative. Psychiatric/Behavioral: The patient has insomnia. Objective:   No flowsheet data found.    General: alert, cooperative, no distress   Mental  status: normal mood, behavior, speech, dress, motor activity, and thought processes, able to follow commands   HENT: NCAT   Neck: no visualized mass   Resp: no respiratory distress   Neuro: no gross deficits   Skin: no discoloration or lesions of concern on visible areas   Psychiatric: normal affect, consistent with stated mood, no evidence of hallucinations     Additional exam findings: We discussed the expected course, resolution and complications of the diagnosis(es) in detail. Medication risks, benefits, costs, interactions, and alternatives were discussed as indicated. I advised her to contact the office if her condition worsens, changes or fails to improve as anticipated. She expressed understanding with the diagnosis(es) and plan. Johann Gastelum, who was evaluated through a patient-initiated, synchronous (real-time) audio-video encounter, and/or her healthcare decision maker, is aware that it is a billable service, with coverage as determined by her insurance carrier. She provided verbal consent to proceed: Yes, and patient identification was verified. It was conducted pursuant to the emergency declaration under the [address] [place] [address] authority and the [employer] General Act. A caregiver was present when appropriate. Ability to conduct physical exam was limited. I was at home. The patient was at home.       Parish Guerrero MD

## 2021-01-19 NOTE — PROGRESS NOTES
Pt was in the hospital for cardiac bypass. Is in a little pain. This is the Subsequent Medicare Annual Wellness Exam, performed 12 months or more after the Initial AWV or the last Subsequent AWV    I have reviewed the patient's medical history in detail and updated the computerized patient record. Depression Risk Factor Screening:     3 most recent PHQ Screens 7/10/2019   Little interest or pleasure in doing things Not at all   Feeling down, depressed, irritable, or hopeless Not at all   Total Score PHQ 2 0       Alcohol Risk Screen    Do you average more than 1 drink per night or more than 7 drinks a week:  No    On any one occasion in the past three months have you have had more than 3 drinks containing alcohol:  No        Functional Ability and Level of Safety:    Hearing: Hearing is good. Activities of Daily Living: The home contains: pt uses cane/walker      Ambulation: uses a cane/walker. Needs help with walking some adl's post surgery. Fall Risk:  Fall Risk Assessment, last 12 mths 7/10/2019   Able to walk? Yes   Fall in past 12 months? No      Abuse Screen:  Patient is not abused       Cognitive Screening    Has your family/caregiver stated any concerns about your memory: no    Cognitive Screening: not done     Assessment/Plan   Education and counseling provided:  Are appropriate based on today's review and evaluation    Diagnoses and all orders for this visit:    1. Anxiety  -     citalopram (CELEXA) 20 mg tablet; Take 1 Tab by mouth daily. 2. Medicare annual wellness visit, subsequent    3. Chronic systolic congestive heart failure ([facility]    4.  Diabetic peripheral neuropathy associated with type 2 diabetes mellitus (HCC)  -     flash glucose scanning reader (FreeStyle Griselda 14 Day [facility]) misc; Use qid as directed  -     flash glucose sensor (FreeStyle Griselda 14 Day Sensor) kit; Use as directed        Health Maintenance Due     Health Maintenance Due   Topic Date Due    Foot Exam Q1 02/09/1963    Shingrix Vaccine Age 50> (1 of 2) 02/09/2003    Bone Densitometry (Dexa) Screening  02/09/2018    MICROALBUMIN Q1  10/27/2018    Breast Cancer Screen Mammogram  10/31/2019       Patient Care Team   Patient Care Team:  Val Canas MD as PCP - General (Family Medicine)  Bettina Partida DPM (Podiatry)  Trisha Gtz MD (Ophthalmology)  Alek Giron MD (Ophthalmology)  Mandy Lux MD (Endocrinology)  Ricardo Coleman RN as Ambulatory Care Manager  Naomi Velasquez MD as Physician (Vascular Surgery)  Jesus Ambrosio MD as Surgeon (Colon and Rectal Surgery)    History     Patient Active Problem List   Diagnosis Code    Hypertension I10    Hyperlipidemia E78.5    Diabetic retinopathy associated with type 2 diabetes mellitus ([address] E11.319    Diabetic peripheral neuropathy associated with type 2 diabetes mellitus ([address] E11.42    Charcot's joint arthropathy in type 2 diabetes mellitus ([address] E11.610    Mass of left breast on mammogram N63.20    ACP (advance care planning) Z71.89    Obesity, morbid ([address] E66.01    Congestive heart failure ([address] I50.9     Past Medical History:   Diagnosis Date    Burning with urination     Charcot's joint disease due to secondary diabetes ([address] 2009    Diabetes ([address]     Diabetic peripheral neuropathy ([address]     Diabetic retinopathy ([address]     H/O transfusion of packed red blood cells 2009    Hypercholesterolemia     Hypertension       Past Surgical History:   Procedure Laterality Date    COLONOSCOPY N/A 5/30/2018    COLONOSCOPY with biopsies performed by Marine Govea MD at [facility] HX CATARACT REMOVAL Bilateral 2003    HX COLONOSCOPY  2008    hx of polyps    HX ORTHOPAEDIC  2011    LT ANKLE-CHARCOT JOINT, metal renard placed    HX ORTHOPAEDIC      right ankle, metal renard placed    HX RETINAL DETACHMENT REPAIR Bilateral 2003     Current Outpatient Medications   Medication Sig Dispense Refill    citalopram (CELEXA) 20 mg tablet Take 1 Tab by mouth daily. 30 Tab 1   • flash glucose scanning reader (FreeStyle Griselda 14 Day [place]) misc Use qid as directed 1 Each 0   • flash glucose sensor (FreeStyle Griselda 14 Day Sensor) kit Use as directed 1 Kit 0   • amLODIPine (NORVASC) 10 mg tablet TAKE 1 TABLET EVERY DAY 30 Tab 0   • glucose blood VI test strips (Accu-Chek Mariel Plus test strp) strip Use bid 100 Strip 2   • carvediloL (Coreg) 12.5 mg tablet Take 2 Tabs by mouth two (2) times a day. 180 Tab 3   • atorvastatin (LIPITOR) 80 mg tablet Take 0.5 Tabs by mouth daily. 90 Tab 3   • cloNIDine HCL (CATAPRES) 0.3 mg tablet Take 1 Tab by mouth two (2) times a day. 180 Tab 3   • clopidogreL (PLAVIX) 75 mg tab Take 1 Tab by mouth daily. 90 Tab 3   • doxazosin (Cardura) 8 mg tablet Take 0.5 Tabs by mouth nightly. 90 Tab 3   • isosorbide mononitrate ER (IMDUR) 30 mg tablet Take 1 Tab by mouth every morning. 90 Tab 3   • Insulin Needles, Disposable, 31 gauge x 5/16\" ndle Use with pen daily 100 Package 3   • insulin glargine (TOUJEO SOLOSTAR) 300 unit/mL (1.5 mL) inpn 30 Units by SubCUTAneous route daily.     • omega 3-dha-epa-fish oil (FISH OIL) 60- mg cap Take 500 mg by mouth two (2) times a day.     • multivitamin (TAB-A-VINCENT) tablet Take 1 Tab by mouth daily.     • VITAMIN A PO Take 1 Tab by mouth daily.     • black cohosh 540 mg cap Take 40 mg by mouth daily.     • Cholecalciferol, Vitamin D3, (VITAMIN D3) 1,000 unit cap Take  by mouth.     • ZINC ACETATE PO Take  by mouth.     • BD SINGLE USE SWABS REGULAR padm      • BD INSULIN SYRINGE ULTRA-FINE 1 mL 30 x 1/2\" syrg        No Known Allergies    Family History   Problem Relation Age of Onset   • Hypertension Mother    • Stroke Mother         TIA   • Arthritis-osteo Mother    • Cancer Mother         cytoblastoma   • Hypertension Father    • Diabetes Father    • Heart Disease Father    • Cancer Sister         colon   • Diabetes Sister      Social History     Tobacco Use   • Smoking status:  Never Smoker    Smokeless tobacco: Never Used   Substance Use Topics    Alcohol use: Yes     Comment: ramya Tony, who was evaluated through a synchronous (real-time) audio-video encounter, and/or her healthcare decision maker, is aware that it is a billable service, with coverage as determined by her insurance carrier. She provided verbal consent to proceed: Yes, and patient identification was verified. It was conducted pursuant to the emergency declaration under the [address] [place] [address] and the [employer] General Act. A caregiver was present when appropriate. Ability to conduct physical exam was limited. I was at home. The patient was at home.     Araseli Phillips MD

## 2021-01-19 NOTE — PATIENT INSTRUCTIONS
Medicare Wellness Visit, Female The best way to live healthy is to have a lifestyle where you eat a well-balanced diet, exercise regularly, limit alcohol use, and quit all forms of tobacco/nicotine, if applicable. Regular preventive services are another way to keep healthy. Preventive services (vaccines, screening tests, monitoring & exams) can help personalize your care plan, which helps you manage your own care. Screening tests can find health problems at the earliest stages, when they are easiest to treat. [facility] follows the current, evidence-based guidelines published by the [facility] Anival Mc ([facility]) when recommending preventive services for our patients. Because we follow these guidelines, sometimes recommendations change over time as research supports it. (For example, mammograms used to be recommended annually. Even though Medicare will still pay for an annual mammogram, the newer guidelines recommend a mammogram every two years for women of average risk). Of course, you and your doctor may decide to screen more often for some diseases, based on your risk and your co-morbidities (chronic disease you are already diagnosed with). Preventive services for you include: - Medicare offers their members a free annual wellness visit, which is time for you and your primary care provider to discuss and plan for your preventive service needs. Take advantage of this benefit every year! 
-All adults over the age of 72 should receive the recommended pneumonia vaccines. Current USPSTF guidelines recommend a series of two vaccines for the best pneumonia protection.  
-All adults should have a flu vaccine yearly and a tetanus vaccine every 10 years.  
-All adults age 48 and older should receive the shingles vaccines (series of two vaccines). -All adults age 38-68 who are overweight should have a diabetes screening test once every three years. -All adults born between [birth year] and 1965 should be screened once for Hepatitis C. 
-Other screening tests and preventive services for persons with diabetes include: an eye exam to screen for diabetic retinopathy, a kidney function test, a foot exam, and stricter control over your cholesterol.  
-Cardiovascular screening for adults with routine risk involves an electrocardiogram (ECG) at intervals determined by your doctor.  
-Colorectal cancer screenings should be done for adults age 54-65 with no increased risk factors for colorectal cancer. There are a number of acceptable methods of screening for this type of cancer. Each test has its own benefits and drawbacks. Discuss with your doctor what is most appropriate for you during your annual wellness visit. The different tests include: colonoscopy (considered the best screening method), a fecal occult blood test, a fecal DNA test, and sigmoidoscopy. 
 
-A bone mass density test is recommended when a woman turns 65 to screen for osteoporosis. This test is only recommended one time, as a screening. Some providers will use this same test as a disease monitoring tool if you already have osteoporosis. -Breast cancer screenings are recommended every other year for women of normal risk, age 54-69. 
-Cervical cancer screenings for women over age 72 are only recommended with certain risk factors. Here is a list of your current Health Maintenance items (your personalized list of preventive services) with a due date: 
Health Maintenance Due Topic Date Due  
 Diabetic Foot Care  02/09/1965  Shingles Vaccine (1 of 2) 02/09/2003  Bone Mineral Density   02/09/2018  Albumin Urine Test  10/27/2018  Mammogram  10/31/2019

## 2021-01-29 DIAGNOSIS — E11.42 DIABETIC PERIPHERAL NEUROPATHY ASSOCIATED WITH TYPE 2 DIABETES MELLITUS (HCC): ICD-10-CM

## 2021-01-29 RX ORDER — FLASH GLUCOSE SENSOR
KIT MISCELLANEOUS
Qty: 1 KIT | Refills: 0 | Status: SHIPPED | OUTPATIENT
Start: 2021-01-29 | End: 2021-02-15

## 2021-01-29 RX ORDER — FLASH GLUCOSE SCANNING READER
EACH MISCELLANEOUS
Qty: 1 EACH | Refills: 0 | Status: SHIPPED | OUTPATIENT
Start: 2021-01-29 | End: 2021-03-03 | Stop reason: SDUPTHER

## 2021-02-04 LAB — CREATININE, EXTERNAL: 4.4

## 2021-02-12 ENCOUNTER — TELEPHONE (OUTPATIENT)
Dept: FAMILY MEDICINE CLINIC | Age: 66
End: 2021-02-12

## 2021-02-12 NOTE — TELEPHONE ENCOUNTER
Marielena Jackson shows that pt has both Rosuvastatin 10 mg date 1/12 and Atorvastatin 40 mg dated 1/8. After reading dr waddell's note.  Pt is currently taking lipitor 40 mg daily

## 2021-02-13 DIAGNOSIS — E11.42 DIABETIC PERIPHERAL NEUROPATHY ASSOCIATED WITH TYPE 2 DIABETES MELLITUS (HCC): ICD-10-CM

## 2021-02-15 ENCOUNTER — TELEPHONE (OUTPATIENT)
Dept: FAMILY MEDICINE CLINIC | Age: 66
End: 2021-02-15

## 2021-02-15 DIAGNOSIS — E11.42 DIABETIC PERIPHERAL NEUROPATHY ASSOCIATED WITH TYPE 2 DIABETES MELLITUS (HCC): ICD-10-CM

## 2021-02-15 RX ORDER — FLASH GLUCOSE SENSOR
KIT MISCELLANEOUS
Qty: 1 KIT | Refills: 0 | Status: SHIPPED | OUTPATIENT
Start: 2021-02-15 | End: 2021-03-03 | Stop reason: SDUPTHER

## 2021-02-15 NOTE — TELEPHONE ENCOUNTER
Patient contacted the office and states she would like the \"CMG\", I asked what this was and she told me continuous glucose monitoring sent through her mail order [facility] [email or web address] and it will need authorization. I saw that sensor kit was sent to [facility] today and she said yes that needs to go to [facility] [email or web address] and they said it needs auth for a glucose monitoring program so it can be set up. She gave me a # that they gave her [phone], she pedrito there is no reference # or anything like that. I was slightly confused with exactly what she was needing as I am not familiar with this so if further question she can be contacted at [phone]. She also asked if Renvela can be sent through [facility] [email or web address] mail order b/c right now it is sent to [facility], her dialysis and it $190. I did not see this on her med list or that Dr. Avani Laughlin has ever prescribed so I instructed her to contact the doctor who is prescribing this for her to see if it can be sent to an alternate pharmacy due to cost b/c we have never prescribed.

## 2021-02-16 ENCOUNTER — TELEPHONE (OUTPATIENT)
Dept: FAMILY MEDICINE CLINIC | Age: 66
End: 2021-02-16

## 2021-02-16 NOTE — TELEPHONE ENCOUNTER
Patient called stating she was [place] [facility] [place] from having  open heart surgery ,  she states she developed a thrombosis and  was prescribed eliquis 2.5 mg  from a historical provider, and was told to call the office to request this medication. Please review and advise and F/U with her ASAP [phone]. Patient states she has already had a virtual with Dr. Steven Potts and forgot to mention it in her visit.

## 2021-02-16 NOTE — TELEPHONE ENCOUNTER
Called patient she says she is asking if she needs to continue taking this medication. She says she has spoken with her cardiologists who told her she does not need for her heart and told her to ask her PCM if he wants her to continue on this medication. Please advise.

## 2021-03-03 RX ORDER — FLASH GLUCOSE SCANNING READER
EACH MISCELLANEOUS
Qty: 1 EACH | Refills: 0 | Status: SHIPPED | OUTPATIENT
Start: 2021-03-03

## 2021-03-03 RX ORDER — FLASH GLUCOSE SENSOR
KIT MISCELLANEOUS
Qty: 1 KIT | Refills: 0 | Status: SHIPPED | OUTPATIENT
Start: 2021-03-03

## 2021-03-03 NOTE — TELEPHONE ENCOUNTER
Called to let pt know she only needs to be on one, but which one should pt be on? Eliquis or Plavix?

## 2021-03-15 ENCOUNTER — PATIENT OUTREACH (OUTPATIENT)
Dept: CASE MANAGEMENT | Age: 66
End: 2021-03-15

## 2021-03-15 NOTE — PROGRESS NOTES
Call placed to patient for follow up. Son answered. CTN identified self, position, and purpose of call. Patients son stated that today was the patient's dialysis day and she was sleeping.   CTN made arrangements to call patient Tomorrow around 10AM

## 2021-03-16 ENCOUNTER — PATIENT OUTREACH (OUTPATIENT)
Dept: CASE MANAGEMENT | Age: 66
End: 2021-03-16

## 2021-03-16 RX ORDER — AMOXICILLIN AND CLAVULANATE POTASSIUM 500; 125 MG/1; MG/1
1 TABLET, FILM COATED ORAL EVERY 12 HOURS
COMMUNITY
Start: 2021-03-13 | End: 2021-03-27

## 2021-03-16 RX ORDER — HYDRALAZINE HYDROCHLORIDE 50 MG/1
50 TABLET, FILM COATED ORAL
COMMUNITY
Start: 2021-03-13 | End: 2021-12-06 | Stop reason: SDUPTHER

## 2021-03-16 RX ORDER — SPIRONOLACTONE 25 MG/1
25 TABLET ORAL DAILY
COMMUNITY
Start: 2021-03-14 | End: 2021-06-10

## 2021-03-16 NOTE — PROGRESS NOTES
Care Transitions Initial Follow Up Call    Call within 2 business days of discharge: Yes     Patient: Belia Quick Patient : 1955 MRN: [id number]  Chief Complaint   Patient presents with    CARDIAC ARREST           Challenges to be reviewed by the provider   Additional needs identified to be addressed with provider no  none  Patient does report that she has no appetite since discharge and reports rib and sternal pain related to resussitation     Method of communication with provider : chart routing, staff message    Discussed COVID-19 related testing which was available at this time. Test results were negative. Patient informed of results, if available? yes     Advance Care Planning:   Does patient have an Advance Directive: not on file     Inpatient Readmission Risk score: No data recorded  Was this a readmission? no   Patient stated reason for the admission: cardiac arrest    Patients top risk factors for readmission: medical condition and medication management heart failure, diabetes and chronic kidney disease  Interventions to address risk factors: Scheduled appointment with Ignacio Gillette, appointment request sent 3/15/21 and patient instructed to call 3/17 if she has not heard from office, Obtained and reviewed discharge summary and/or continuity of care documents, Education of patient/family/caregiver/guardian to support self-management-pain management and Assessment and support for treatment adherence and medication management-to include medication, purpous, dose and frequency    Care Transition Nurse contacted the patient by telephone to perform post hospital discharge assessment. Verified name and  with patient as identifiers. Provided introduction to self, and explanation of the Care Transition Nurse role. Care Transition Nurse reviewed discharge instructions, medical action plan and red flags with patient who verbalized understanding.  Were discharge instructions available to patient? yes. Reviewed appropriate site of care based on symptoms and resources available to patient including: PCP, Specialist and CTN. Patient given an opportunity to ask questions and does not have any further questions or concerns at this time. The patient agrees to contact the PCP office for questions related to their healthcare. Medication reconciliation was performed with patient, who verbalizes understanding of administration of home medications. Advised obtaining a 90-day supply of all daily and as-needed medications. Referral to Pharm D needed: no     Home Health/Outpatient orders at discharge: home health care, PT, OT and [facility]: [facility]  Date of initial visit: 3/16/2021     Durable Medical Equipment ordered at discharge: Cane/Walker/Crutches  [address]: [address] [facility] received: at discharge    [facility] Education    Patient has following risk factors of: heart failure, diabetes and chronic kidney disease. Education provided regarding infection prevention, and signs and symptoms of COVID-19 and when to seek medical attention with patient who verbalized understanding. Discussed exposure protocols and quarantine From CDC: Are you at higher risk for severe illness?  and given an opportunity for questions and concerns. The patient agrees to contact the COVID-19 hotline [phone] or PCP office for questions related to COVID-19. For more information on steps you can take to protect yourself, see CDC's How to Protect Yourself     Was patient discharged with a pulse oximeter? no Discussed and confirmed pulse oximeter discharge instructions and when to notify provider or seek emergency care. Patient/family/caregiver given information for Critical access hospital and agrees to enroll no  Patient's preferred e-mail: declines  Patient's preferred phone number: declines    Discussed follow-up appointments.  If no appointment was previously scheduled, appointment scheduling offered: yes Is follow up appointment scheduled within 7 days of discharge? no request sent to schedular on 3/15/21  [facility] follow up appointment(s): No future appointments. Non-[facility] follow up appointment(s): TBD    Plan for follow-up call in 7-10 days based on severity of symptoms and risk factors. Plan for next call: symptom management-ask about rib and sternal pain, follow up appointment-when is her next cardiology appointment document provider's name and discuss ACP with patient  Care Transition Nurse provided contact information for future needs. Goals Addressed                 This Visit's Progress     Prevent complications post hospitalization. 1. CTN will monitor X 4 weeks    2. Ensure provider appt is scheduled within 7 days post-discharge  3/16/2021 requested [facility]MOND appointment  3. Confirm patient attended post-discharge provider apt    4. Complete post-visit call to confirm attendance and update care needs      5. Review/educate common or potential \"red flags\" of condition worsening   weight gain of 2 pounds in 24 hours 3 pounds in 3 days or 4- 5 pounds in one week. Severe shortness of breath  Swelling of lower extremeties  Swelling in abdomen  Loss of appetite  Lightheaded or dizziness   6. Evaluate adherence to medications and priority barriers to resolve        7. Instruct on adherence to medications as ordered and assess for therapeutic response and side-effects         8. Discuss and evaluate ADL performance. Provide recommendations on energy conservation, particularly related to transition home from an inpatient admission.

## 2021-03-19 ENCOUNTER — PATIENT OUTREACH (OUTPATIENT)
Dept: CASE MANAGEMENT | Age: 66
End: 2021-03-19

## 2021-03-19 NOTE — PROGRESS NOTES
CTN called PCP office to schedule a follow up appointment with PCP. Spoke with Ronnie Shepard who said they only had an 21 [phone] appointment available on Monday. KATHRYN called patient unsuccessfully to see if that appointment would suit. CTN then called Ronnie Shepard back to see if there were any other appointments on Tuesday or Thursday but the PCP is not in the office those days. CTN then called ange\"s  and talked to him. Patient has dialysis MWF 6 -1030 AM.  CTN asked him to call PCP office and talk to Ronnie Shepard to schedule hospital follow up.  agreed to call.

## 2021-03-25 ENCOUNTER — PATIENT OUTREACH (OUTPATIENT)
Dept: CASE MANAGEMENT | Age: 66
End: 2021-03-25

## 2021-03-25 ENCOUNTER — TELEPHONE (OUTPATIENT)
Dept: FAMILY MEDICINE CLINIC | Age: 66
End: 2021-03-25

## 2021-03-25 ENCOUNTER — OFFICE VISIT (OUTPATIENT)
Dept: FAMILY MEDICINE CLINIC | Age: 66
End: 2021-03-25
Payer: MEDICARE

## 2021-03-25 VITALS
TEMPERATURE: 98.1 F | SYSTOLIC BLOOD PRESSURE: 130 MMHG | HEART RATE: 72 BPM | DIASTOLIC BLOOD PRESSURE: 74 MMHG | OXYGEN SATURATION: 100 % | RESPIRATION RATE: 12 BRPM

## 2021-03-25 DIAGNOSIS — Z78.0 MENOPAUSE: ICD-10-CM

## 2021-03-25 DIAGNOSIS — E11.621 TYPE 2 DIABETES MELLITUS WITH FOOT ULCER (CODE) (HCC): Primary | ICD-10-CM

## 2021-03-25 DIAGNOSIS — E44.0 MODERATE PROTEIN-CALORIE MALNUTRITION (HCC): ICD-10-CM

## 2021-03-25 DIAGNOSIS — E11.3593 PROLIFERATIVE DIABETIC RETINOPATHY OF BOTH EYES WITHOUT MACULAR EDEMA ASSOCIATED WITH TYPE 2 DIABETES MELLITUS (HCC): ICD-10-CM

## 2021-03-25 DIAGNOSIS — I50.22 CHRONIC SYSTOLIC CONGESTIVE HEART FAILURE (HCC): ICD-10-CM

## 2021-03-25 DIAGNOSIS — E66.01 OBESITY, MORBID (HCC): ICD-10-CM

## 2021-03-25 DIAGNOSIS — N25.81 SECONDARY HYPERPARATHYROIDISM OF RENAL ORIGIN (HCC): ICD-10-CM

## 2021-03-25 DIAGNOSIS — K55.9 ISCHEMIC BOWEL DISEASE (HCC): ICD-10-CM

## 2021-03-25 DIAGNOSIS — D69.6 THROMBOCYTOPENIA, UNSPECIFIED (HCC): ICD-10-CM

## 2021-03-25 PROCEDURE — 99495 TRANSJ CARE MGMT MOD F2F 14D: CPT | Performed by: FAMILY MEDICINE

## 2021-03-25 PROCEDURE — G8427 DOCREV CUR MEDS BY ELIG CLIN: HCPCS | Performed by: FAMILY MEDICINE

## 2021-03-25 RX ORDER — CINACALCET 30 MG/1
30 TABLET, FILM COATED ORAL DAILY
COMMUNITY
End: 2021-06-10 | Stop reason: SDUPTHER

## 2021-03-25 RX ORDER — FOLIC ACID/VIT B COMPLEX AND C 0.8 MG
1 TABLET ORAL DAILY
COMMUNITY
Start: 2020-08-24 | End: 2021-06-10 | Stop reason: SDUPTHER

## 2021-03-25 RX ORDER — METOPROLOL TARTRATE 25 MG/1
50 TABLET, FILM COATED ORAL 2 TIMES DAILY
COMMUNITY
End: 2021-06-10 | Stop reason: SDUPTHER

## 2021-03-25 RX ORDER — ASPIRIN 81 MG/1
81 TABLET ORAL DAILY
Qty: 100 TAB | Refills: 1 | Status: SHIPPED | OUTPATIENT
Start: 2021-03-25

## 2021-03-25 RX ORDER — CLONIDINE HYDROCHLORIDE 0.1 MG/1
0.1 TABLET ORAL DAILY
Qty: 30 TAB | Refills: 0 | Status: SHIPPED | OUTPATIENT
Start: 2021-03-25 | End: 2021-06-10

## 2021-03-25 NOTE — PROGRESS NOTES
Patient attended virtual appointment today with PCP. No new orders noted at this time CTN will follow up with patient in 1 week.

## 2021-03-25 NOTE — PATIENT INSTRUCTIONS
Hot Flashes During Menopause: Care Instructions Your Care Instructions A hot flash is a sudden feeling of intense body heat. Your head, neck, and chest may get red. Your heartbeat may speed up, and you may feel anxious. You may find that hot flashes occur more often in warm rooms or during stressful times. Hot flashes and other symptoms are a normal response to the hormone changes that occur before your menstrual cycle goes away completely (menopause). Hot flashes often get better and go away with time. Some women take hormone pills or other medicine to treat bothersome symptoms. Follow-up care is a key part of your treatment and safety. Be sure to make and go to all appointments, and call your doctor if you are having problems. It's also a good idea to know your test results and keep a list of the medicines you take. How can you care for yourself at home? · If you decide to take medicine to treat hot flashes, take it exactly as prescribed. Call your doctor if you think you are having a problem with your medicine. You will get more details on the specific medicine your doctor prescribes. · Learn to meditate. Sit quietly and focus on your breathing. Try to practice each day. Books, classes, and tapes can help you start a program. 
· Wear natural fabrics, such as cotton and silk. Dress in layers so you can take off clothes as needed. · Keep the room temperature cool, or use a fan. You are more likely to have a hot flash when you are too warm than when you are cool. · Use fewer blankets when you sleep at night. · Drink cold fluids rather than hot ones. Limit your intake of caffeine and alcohol. · Eat smaller meals more often during the day so your body makes less heat than when digesting large amounts of food. Eat low-fat and high-fiber foods. · Do not smoke. Smoking can make hot flashes worse. If you need help quitting, talk to your doctor about stop-smoking programs and medicines.  These can increase your chances of quitting for good. · Get at least 30 minutes of exercise on most days of the week. Walking is a good choice. You also may want to do other activities, such as running, swimming, cycling, or playing tennis or team sports. Where can you learn more? Go to http://www.gray.com/ Enter F700 in the search box to learn more about \"Hot Flashes During Menopause: Care Instructions. \" Current as of: November 8, 2019               Content Version: 12.6 © 0736-4682 [employer]. Care instructions adapted under license by [employer] (which disclaims liability or warranty for this information). If you have questions about a medical condition or this instruction, always ask your healthcare professional. [address] any warranty or liability for your use of this information.

## 2021-03-25 NOTE — PROGRESS NOTES
Luis Pisano is a 74 y.o. female  presents for follow from Hospital.  She was hosp on March 8 2021 at [facility]. She was discharged on 3/13/21. She was contacted by nurse on 3/15/21. Her diagnosis was cardiac arrest.  She has some fatigue and chest discomfort and some SOB. No Known Allergies  Outpatient Medications Marked as Taking for the 3/25/21 encounter (Office Visit) with Christian Wood MD   Medication Sig Dispense Refill    b complex-vitamin c-folic acid 0.8 mg (Dialyvite 800) 0.8 mg tab tablet Take 1 Tab by mouth daily.  hydrALAZINE (APRESOLINE) 50 mg tablet Take 50 mg by mouth.  spironolactone (ALDACTONE) 25 mg tablet Take 25 mg by mouth daily.  amLODIPine (NORVASC) 10 mg tablet TAKE 1 TABLET EVERY DAY 30 Tab 0    clopidogreL (PLAVIX) 75 mg tab Take 1 Tab by mouth daily.  90 Tab 3     Patient Active Problem List   Diagnosis Code    Hypertension I10    Hyperlipidemia E78.5    Diabetic retinopathy associated with type 2 diabetes mellitus ([address] E11.319    Diabetic peripheral neuropathy associated with type 2 diabetes mellitus ([address] E11.42    Charcot's joint arthropathy in type 2 diabetes mellitus ([address] E11.610    Mass of left breast on mammogram N63.20    ACP (advance care planning) Z71.89    Obesity, morbid ([address] E66.01    Congestive heart failure ([address] I50.9     Past Medical History:   Diagnosis Date    Burning with urination     Charcot's joint disease due to secondary diabetes ([address] 2009    Diabetes ([address]     Diabetic peripheral neuropathy ([address]     Diabetic retinopathy ([address]     H/O transfusion of packed red blood cells 2009    Hypercholesterolemia     Hypertension      Social History     Socioeconomic History    Marital status:      Spouse name: Not on file    Number of children: Not on file    Years of education: Not on file    Highest education level: Not on file   Tobacco Use    Smoking status: Never Smoker    Smokeless tobacco: Never Used   Substance and Sexual Activity    Alcohol use: Yes     Comment: rare    Drug use: No    Sexual activity: Yes     Partners: Male     Birth control/protection: None     Family History   Problem Relation Age of Onset    Hypertension Mother     Stroke Mother         TIA    Arthritis-osteo Mother     Cancer Mother         cytoblastoma    Hypertension Father     Diabetes Father     Heart Disease Father     Cancer Sister         colon    Diabetes Sister         Review of Systems   Constitutional: Positive for malaise/fatigue. Negative for chills, fever and weight loss. Eyes: Negative for blurred vision. Respiratory: Negative for shortness of breath and wheezing. Cardiovascular: Positive for leg swelling. Negative for chest pain. Gastrointestinal: Negative for nausea and vomiting. Musculoskeletal: Negative for myalgias. Skin: Negative for rash. Neurological: Negative for weakness. Vitals:    03/25/21 1416   BP: 130/74   Pulse: 72   Resp: 12   Temp: 98.1 °F (36.7 °C)   SpO2: 100%   PainSc:   0 - No pain       Physical Exam  Vitals signs and nursing note reviewed. Neck:      Musculoskeletal: Normal range of motion and neck supple. Thyroid: No thyromegaly. Cardiovascular:      Rate and Rhythm: Normal rate and regular rhythm. Pulses: Normal pulses. Heart sounds: Murmur present. Pulmonary:      Effort: Pulmonary effort is normal.      Breath sounds: Normal breath sounds. No rales. Musculoskeletal: Normal range of motion. Skin:     General: Skin is warm and dry. Capillary Refill: Capillary refill takes less than 2 seconds. Neurological:      General: No focal deficit present. Mental Status: She is alert and oriented to person, place, and time. Psychiatric:         Mood and Affect: Mood normal.         Behavior: Behavior normal.         Thought Content:  Thought content normal.         Judgment: Judgment normal.         Assessment/Plan ICD-10-CM ICD-9-CM    1. Type 2 diabetes mellitus with foot ulcer (CODE) ([place] [address]  E11.621 250.80 stable     707.15    2. Moderate protein-calorie malnutrition (HCC)  E44.0 263.0 improved   3. Obesity, morbid ([place] [address]  E66.01 278.01    4. Proliferative diabetic retinopathy of both eyes without macular edema associated with type 2 diabetes mellitus ([place] [address]  E11.3593 250.50 stable     362.02    5. Secondary hyperparathyroidism of renal origin ([place] [address]  N25.81 588.81 stable   6. Thrombocytopenia, unspecified ([facility])  D69.6 287.5 stable   7. Chronic systolic congestive heart failure ([facility])  I50.22 428.22 aspirin delayed-release 81 mg tablet     428.0    8. Menopause  Z78.0 627.2 cloNIDine HCL (CATAPRES) 0.1 mg tablet   9. Ischemic bowel disease ([place] [address]  K55.9 557.9 Resolved. I have discussed the diagnosis with the patient and the intended plan of care as seen in the above orders. The patient has received an after-visit summary and questions were answered concerning future plans. I have discussed medication, side effects, and warnings with the patient in detail. The patient verbalized understanding and is in agreement with the plan of care. The patient will contact the office with any additional concerns. lab results and schedule of future lab studies reviewed with patient    Theo Bradford MD I have discussed the diagnosis with the patient and the intended plan of care as seen in the above orders. The patient has received an after-visit summary and questions were answered concerning future plans. I have discussed medication, side effects, and warnings with the patient in detail. The patient verbalized understanding and is in agreement with the plan of care. The patient will contact the office with any additional concerns.

## 2021-03-25 NOTE — TELEPHONE ENCOUNTER
Have you been diagnosed with, tested for, or told that you are suspected of having COVID-19 (coronovirus)? no  Have you had a fever or taken medication to treat a fever in the past 72 hours? no  Have you had a cough, SOB, or flu-like symptoms within the past 3 days? no  Have you had direct contact with someone who tested positive for COVID-19 within the past 14 days? no  Do you have a household member with flu-like symptoms, including fever, cough, or SOB? no  Do you currently have flu-like symptoms including fever, cough, or SOB?  no  Are you experiencing new loss of taste or smell? no

## 2021-04-01 ENCOUNTER — PATIENT OUTREACH (OUTPATIENT)
Dept: CASE MANAGEMENT | Age: 66
End: 2021-04-01

## 2021-04-01 NOTE — PROGRESS NOTES
Care Transitions Subsequent Call    Additional needs identified to be addressed with provider no  none         Method of communication with provider : chart routing    Discussed COVID-19 related testing which was available at this time. Test results were negative. Patient informed of results, if available? yes     Care Transition Nursecontacted the patient by telephone to follow up after admission on 3/8/21. Verified name and  with patient as identifiers. Addressed changes since last contact: symptom management-chest pain has improved  Discharge needs reviewed: home health care, PT and SN None  Follow up appointment completed? yes yes Was follow up appointment scheduled within 7 days of discharge? no had to work around dialysis schedule    Advance Care Planning:   Does patient have an Advance Directive:  not on file     Care Transition Nurse reviewed discharge instructions, medical action plan and red flags with patient and discussed any barriers to care and/or understanding of plan of care after discharge. Discussed appropriate site of care based on symptoms and resources available to patient including: PCP and CTN. The patient agrees to contact the PCP office for questions related to their healthcare. Patients top risk factors for readmission: medical condition heart failure, diabetes and chronic kidney disease  Interventions to address risk factors: Obtained and reviewed discharge summary and/or continuity of care documents    [facility] follow up appointment(s): No future appointments. Non-[facility] follow up appointment(s): dialysis MWF    Plan for follow-up call in 10-14 days based on severity of symptoms and risk factors. Plan for next call: symptom management-chest pain   Care Transition Nurse provided contact information for future needs. Goals Addressed                 This Visit's Progress     Prevent complications post hospitalization. 1. CTN will monitor X 4 weeks    2.  Ensure provider appt is scheduled within 7 days post-discharge  3/16/2021 requested [facility] appointment  3. Confirm patient attended post-discharge provider apt  3/25/21 patient attended PCP appointment   4. Complete post-visit call to confirm attendance and update care needs      5. Review/educate common or potential \"red flags\" of condition worsening   weight gain of 2 pounds in 24 hours 3 pounds in 3 days or 4- 5 pounds in one week. Severe shortness of breath  Swelling of lower extremeties  Swelling in abdomen  Loss of appetite  Lightheaded or dizziness   6. Evaluate adherence to medications and priority barriers to resolve        7. Instruct on adherence to medications as ordered and assess for therapeutic response and side-effects         8. Discuss and evaluate ADL performance. Provide recommendations on energy conservation, particularly related to transition home from an inpatient admission.

## 2021-04-13 ENCOUNTER — PATIENT OUTREACH (OUTPATIENT)
Dept: CASE MANAGEMENT | Age: 66
End: 2021-04-13

## 2021-04-13 NOTE — PROGRESS NOTES
4/13/2021 11:47 AM     CTN attempted to contact patient for routine follow up. Message left introducing myself, the purpose of the call and giving my contact information. Requested that patient call back at her earliest convenience.

## 2021-04-14 ENCOUNTER — PATIENT OUTREACH (OUTPATIENT)
Dept: CASE MANAGEMENT | Age: 66
End: 2021-04-14

## 2021-04-14 NOTE — PROGRESS NOTES
4/14/2021 11:47 AM     CTN attempted to contact patient for routine follow up. Message left introducing myself, the purpose of the call and giving my contact information. Requested that patient call back at her earliest convenience. Patient has graduated from the Transitions of Care Coordination  program on 4/14/2021. Patient's symptoms are stable at this time. Patient/family has the ability to self-manage. Care management goals have been completed at this time. No further care transitions nurse follow up scheduled. Goals Addressed                 This Visit's Progress     Prevent complications post hospitalization. On track     1. CTN will monitor X 4 weeks    2. Ensure provider appt is scheduled within 7 days post-discharge  3/16/2021 requested [facility] appointment  3. Confirm patient attended post-discharge provider apt  3/25/21 patient attended PCP appointment   4. Complete post-visit call to confirm attendance and update care needs      5. Review/educate common or potential \"red flags\" of condition worsening   weight gain of 2 pounds in 24 hours 3 pounds in 3 days or 4- 5 pounds in one week. Severe shortness of breath  Swelling of lower extremeties  Swelling in abdomen  Loss of appetite  Lightheaded or dizziness   6. Evaluate adherence to medications and priority barriers to resolve        7. Instruct on adherence to medications as ordered and assess for therapeutic response and side-effects         8. Discuss and evaluate ADL performance. Provide recommendations on energy conservation, particularly related to transition home from an inpatient admission. Patient has care transitions nurse contact information for any further questions, concerns, or needs.   Patient's upcoming visits:    Future Appointments   Date Time Provider Jenise Ahumada   4/22/2021  8:15 AM Damon Cardoso MD [facility] BS AMB

## 2021-05-03 DIAGNOSIS — E11.621 TYPE 2 DIABETES MELLITUS WITH FOOT ULCER (CODE) (HCC): Primary | ICD-10-CM

## 2021-05-03 RX ORDER — BLOOD-GLUCOSE,RECEIVER,CONT
EACH MISCELLANEOUS
Qty: 1 EACH | Refills: 2 | Status: SHIPPED | OUTPATIENT
Start: 2021-05-03

## 2021-05-03 RX ORDER — BLOOD-GLUCOSE SENSOR
EACH MISCELLANEOUS
Qty: 1 DEVICE | Refills: 2 | Status: SHIPPED | OUTPATIENT
Start: 2021-05-03

## 2021-05-10 ENCOUNTER — TELEPHONE (OUTPATIENT)
Dept: FAMILY MEDICINE CLINIC | Age: 66
End: 2021-05-10

## 2021-05-10 NOTE — TELEPHONE ENCOUNTER
Mrs. Vahid Salomon is calling to check the status of her form for her glucose meter & supplies. She states the supply company keeps saying they're calling here to [facility], & we are telling them she isn't a patient. I explained to her that we have sent the form, however it came back because more information is required. She would like a call back today once completed & faxed.

## 2021-06-08 ENCOUNTER — PATIENT OUTREACH (OUTPATIENT)
Dept: CASE MANAGEMENT | Age: 66
End: 2021-06-08

## 2021-06-08 NOTE — PROGRESS NOTES
Care Transitions Initial Call    Call within 2 business days of discharge: Yes     Patient: Lea  Patient : 1955 MRN: [id number]    Discharge Diagnosis:   Dizziness secondary to recurrent atrial tachycardia;   CAD s/p CABG x 2 2021   PAD s/p stent   Elevated troponin-type II demand ischemia   No chest pain   Chronic diastolic heart failure   Pulmonary hypertension   History of IJ thrombus-Eliquis DC on previous admission   ESRD on hemodialysis    Essential hypertension   Hyperlipidemia   Type 2 diabetes mellitus   Anemia of chronic kidney disease   Left upper lobe pulmonary nodule 3 mm-low risk patient optional to CT chest in 12 months for follow up with PCP      Was this an external facility discharge? Yes, 21 Discharge Facility: [facility] follow up appointment(s): No future appointments. [facility] appointment(s): Dialysis M,W,F    Call placed to patient at preferred number. Attempted to reach patient for [facility] and hospital follow up. No answer and there was no way to leave a message because voice mail full. Goals      Prevent complications post hospitalization. 1. CTN will monitor X 4 weeks    2. Ensure provider appt is scheduled within 7 days post-discharge  2021 CTN sent request to PCP for [facility] appointment  3. Confirm patient attended post-discharge provider apt    4. Complete post-visit call to confirm attendance and update care needs      5. Review/educate common or potential \"red flags\" of condition worsening    6. Evaluate adherence to medications and priority barriers to resolve        7. Instruct on adherence to medications as ordered and assess for therapeutic response and side-effects         8. Discuss and evaluate ADL performance. Provide recommendations on energy conservation, particularly related to transition home from an inpatient admission.

## 2021-06-09 ENCOUNTER — PATIENT OUTREACH (OUTPATIENT)
Dept: CASE MANAGEMENT | Age: 66
End: 2021-06-09

## 2021-06-09 NOTE — PROGRESS NOTES
Care Transitions Initial Call    Call within 2 business days of discharge: Yes     Patient: Eric Granado Patient : 1955 MRN: [id number]    Discharge Diagnosis:   Dizziness secondary to recurrent atrial tachycardia;   CAD s/p CABG x 2 2021   PAD s/p stent   Elevated troponin-type II demand ischemia   No chest pain   Chronic diastolic heart failure   Pulmonary hypertension   History of IJ thrombus-Eliquis DC on previous admission   ESRD on hemodialysis    Essential hypertension   Hyperlipidemia   Type 2 diabetes mellitus   Anemia of chronic kidney disease   Left upper lobe pulmonary nodule 3 mm-low risk patient optional to CT chest in 12 months for follow up with PCP      Was this an external facility discharge? Yes, 21 Discharge Facility: [facility] follow up appointment(s): No future appointments. Non-[facility] follow up appointment(s): Dialysis M,W,F    Call placed to patient at preferred number. Attempted to reach patient for [facility] and hospital follow up. No answer and there was no way to leave a message because voice mail full. Goals      Prevent complications post hospitalization. 1. CTN will monitor X 4 weeks    2. Ensure provider appt is scheduled within 7 days post-discharge  2021 CTN sent request to PCP for [facility] appointment  3. Confirm patient attended post-discharge provider apt    4. Complete post-visit call to confirm attendance and update care needs      5. Review/educate common or potential \"red flags\" of condition worsening    6. Evaluate adherence to medications and priority barriers to resolve        7. Instruct on adherence to medications as ordered and assess for therapeutic response and side-effects         8. Discuss and evaluate ADL performance. Provide recommendations on energy conservation, particularly related to transition home from an inpatient admission.

## 2021-06-10 ENCOUNTER — OFFICE VISIT (OUTPATIENT)
Dept: FAMILY MEDICINE CLINIC | Age: 66
End: 2021-06-10
Payer: MEDICARE

## 2021-06-10 DIAGNOSIS — E78.49 OTHER HYPERLIPIDEMIA: ICD-10-CM

## 2021-06-10 DIAGNOSIS — I10 ESSENTIAL HYPERTENSION: ICD-10-CM

## 2021-06-10 DIAGNOSIS — E11.42 DIABETIC PERIPHERAL NEUROPATHY ASSOCIATED WITH TYPE 2 DIABETES MELLITUS (HCC): ICD-10-CM

## 2021-06-10 PROCEDURE — G8427 DOCREV CUR MEDS BY ELIG CLIN: HCPCS | Performed by: FAMILY MEDICINE

## 2021-06-10 PROCEDURE — G8756 NO BP MEASURE DOC: HCPCS | Performed by: FAMILY MEDICINE

## 2021-06-10 PROCEDURE — 1101F PT FALLS ASSESS-DOCD LE1/YR: CPT | Performed by: FAMILY MEDICINE

## 2021-06-10 PROCEDURE — G8400 PT W/DXA NO RESULTS DOC: HCPCS | Performed by: FAMILY MEDICINE

## 2021-06-10 PROCEDURE — 3017F COLORECTAL CA SCREEN DOC REV: CPT | Performed by: FAMILY MEDICINE

## 2021-06-10 PROCEDURE — 2022F DILAT RTA XM EVC RTNOPTHY: CPT | Performed by: FAMILY MEDICINE

## 2021-06-10 PROCEDURE — G8536 NO DOC ELDER MAL SCRN: HCPCS | Performed by: FAMILY MEDICINE

## 2021-06-10 PROCEDURE — G8510 SCR DEP NEG, NO PLAN REQD: HCPCS | Performed by: FAMILY MEDICINE

## 2021-06-10 PROCEDURE — 3046F HEMOGLOBIN A1C LEVEL >9.0%: CPT | Performed by: FAMILY MEDICINE

## 2021-06-10 PROCEDURE — G8421 BMI NOT CALCULATED: HCPCS | Performed by: FAMILY MEDICINE

## 2021-06-10 PROCEDURE — 99214 OFFICE O/P EST MOD 30 MIN: CPT | Performed by: FAMILY MEDICINE

## 2021-06-10 PROCEDURE — 1090F PRES/ABSN URINE INCON ASSESS: CPT | Performed by: FAMILY MEDICINE

## 2021-06-10 RX ORDER — CINACALCET 30 MG/1
30 TABLET, FILM COATED ORAL DAILY
Qty: 90 TABLET | Refills: 3 | Status: SHIPPED | OUTPATIENT
Start: 2021-06-10 | End: 2022-05-31 | Stop reason: SDUPTHER

## 2021-06-10 RX ORDER — METOPROLOL TARTRATE 25 MG/1
50 TABLET, FILM COATED ORAL 2 TIMES DAILY
Qty: 90 TABLET | Refills: 3 | Status: SHIPPED | OUTPATIENT
Start: 2021-06-10 | End: 2021-09-16

## 2021-06-10 RX ORDER — AMIODARONE HYDROCHLORIDE 200 MG/1
200 TABLET ORAL 2 TIMES DAILY WITH MEALS
COMMUNITY
Start: 2021-06-05

## 2021-06-10 RX ORDER — SEVELAMER HYDROCHLORIDE 800 MG/1
800 TABLET, FILM COATED ORAL
Qty: 90 TABLET | Refills: 3 | Status: SHIPPED | OUTPATIENT
Start: 2021-06-10 | End: 2021-09-21 | Stop reason: SDUPTHER

## 2021-06-10 RX ORDER — FOLIC ACID/VIT B COMPLEX AND C 0.8 MG
1 TABLET ORAL DAILY
Qty: 90 TABLET | Refills: 3 | Status: SHIPPED | OUTPATIENT
Start: 2021-06-10

## 2021-06-10 RX ORDER — AMLODIPINE BESYLATE 10 MG/1
TABLET ORAL
Qty: 90 TABLET | Refills: 3 | Status: SHIPPED | OUTPATIENT
Start: 2021-06-10 | End: 2022-03-29 | Stop reason: SDUPTHER

## 2021-06-10 RX ORDER — CLONIDINE 0.1 MG/24H
1 PATCH, EXTENDED RELEASE TRANSDERMAL
Qty: 12 PATCH | Refills: 3 | Status: SHIPPED | OUTPATIENT
Start: 2021-06-10 | End: 2022-07-07 | Stop reason: SDUPTHER

## 2021-06-10 RX ORDER — PEN NEEDLE, DIABETIC 30 GX3/16"
NEEDLE, DISPOSABLE MISCELLANEOUS
Qty: 100 PACKAGE | Refills: 3 | Status: SHIPPED | OUTPATIENT
Start: 2021-06-10

## 2021-06-10 RX ORDER — CLOPIDOGREL BISULFATE 75 MG/1
75 TABLET ORAL DAILY
Qty: 90 TABLET | Refills: 3 | Status: SHIPPED | OUTPATIENT
Start: 2021-06-10 | End: 2022-01-05

## 2021-06-10 RX ORDER — ATORVASTATIN CALCIUM 40 MG/1
40 TABLET, FILM COATED ORAL DAILY
Qty: 90 TABLET | Refills: 3 | Status: SHIPPED | OUTPATIENT
Start: 2021-06-10 | End: 2022-07-07 | Stop reason: SDUPTHER

## 2021-06-10 NOTE — PROGRESS NOTES
Yari Mcdonough is a 74 y.o. female  presents for follow up from hospital for tachycardia. No chest pains or SOB. No Known Allergies  Outpatient Medications Marked as Taking for the 6/10/21 encounter (Office Visit) with Damon Cardoso MD   Medication Sig Dispense Refill    amiodarone (CORDARONE) 200 mg tablet Take 200 mg by mouth two (2) times daily (with meals).  aspirin delayed-release 81 mg tablet Take 1 Tab by mouth daily.  100 Tab 1    amLODIPine (NORVASC) 10 mg tablet TAKE 1 TABLET EVERY DAY 30 Tab 0     Patient Active Problem List   Diagnosis Code    Hypertension I10    Hyperlipidemia E78.5    Diabetic retinopathy associated with type 2 diabetes mellitus ([facility] [address] E11.319    Diabetic peripheral neuropathy associated with type 2 diabetes mellitus (HCC) E11.42    Charcot's joint arthropathy in type 2 diabetes mellitus ([facility] [place] [address] E11.610    Mass of left breast on mammogram N63.20    ACP (advance care planning) Z71.89    Obesity, morbid ([facility]) E66.01    Congestive heart failure ([facility]) I50.9     Past Medical History:   Diagnosis Date    Burning with urination     Charcot's joint disease due to secondary diabetes ([facility] [place] [address] 2009    Diabetes ([facility] [place] [address]     Diabetic peripheral neuropathy ([facility] [place] [address]     Diabetic retinopathy ([facility] [place] [address]     H/O transfusion of packed red blood cells 2009    Hypercholesterolemia     Hypertension      Social History     Socioeconomic History    Marital status:      Spouse name: Not on file    Number of children: Not on file    Years of education: Not on file    Highest education level: Not on file   Tobacco Use    Smoking status: Never Smoker    Smokeless tobacco: Never Used   Substance and Sexual Activity    Alcohol use: Yes     Comment: rare    Drug use: No    Sexual activity: Yes     Partners: Male     Birth control/protection: None     Social Determinants of Health     Financial Resource Strain:     Difficulty of Paying Living Expenses:    Food Insecurity:     Worried About [facility] in the Last Year:    [address] in the Last Year:    Transportation Needs:     Lack of Transportation (Medical):  Lack of Transportation (Non-Medical):    Physical Activity:     Days of Exercise per Week:     Minutes of Exercise per Session:    Stress:     Feeling of Stress :    Social Connections:     Frequency of Communication with Friends and Family:     Frequency of Social Gatherings with Friends and Family:     Attends Religion Services:     Active Member of Clubs or Organizations:     Attends Club or Organization Meetings:     Marital Status:      Family History   Problem Relation Age of Onset    Hypertension Mother     Stroke Mother         TIA    Arthritis-osteo Mother     Cancer Mother         cytoblastoma    Hypertension Father     Diabetes Father     Heart Disease Father     Cancer Sister         colon    Diabetes Sister         Review of Systems   Constitutional: Negative for chills, fever, malaise/fatigue and weight loss. Eyes: Negative for blurred vision. Respiratory: Negative for cough, shortness of breath and wheezing. Cardiovascular: Negative for chest pain. Gastrointestinal: Negative for nausea and vomiting. Musculoskeletal: Negative for myalgias. Skin: Negative for rash. Neurological: Negative for weakness. There were no vitals filed for this visit. Physical Exam  Vitals and nursing note reviewed. Constitutional:       Appearance: Normal appearance. Neck:      Thyroid: No thyromegaly. Cardiovascular:      Rate and Rhythm: Normal rate and regular rhythm. Pulses: Normal pulses. Heart sounds: Normal heart sounds. Pulmonary:      Effort: Pulmonary effort is normal.      Breath sounds: Normal breath sounds. Musculoskeletal:         General: Normal range of motion. Cervical back: Normal range of motion and neck supple. Skin:     General: Skin is warm and dry.       Capillary Refill: Capillary refill takes less than 2 seconds. Neurological:      General: No focal deficit present. Mental Status: She is alert and oriented to person, place, and time. Psychiatric:         Mood and Affect: Mood normal.         Behavior: Behavior normal.         Thought Content: Thought content normal.         Judgment: Judgment normal.         Assessment/Plan      ICD-10-CM ICD-9-CM    1. Essential hypertension  I10 401.9 amLODIPine (NORVASC) 10 mg tablet      cloNIDine (Catapres-TTS-1) 0.1 mg/24 hr ptwk      clopidogreL (PLAVIX) 75 mg tab      metoprolol tartrate (LOPRESSOR) 25 mg tablet   2. Other hyperlipidemia  E78.49 272.4 atorvastatin (LIPITOR) 40 mg tablet   3. Diabetic peripheral neuropathy associated with type 2 diabetes mellitus (HCC)  E11.42 250.60 cinacalcet (SENSIPAR) 30 mg tablet     357.2 b complex-vitamin c-folic acid 0.8 mg (Dialyvite 800) 0.8 mg tab tablet      sevelamer (RENAGEL) 800 mg tablet      Insulin Needles, Disposable, 31 gauge x 5/16\" ndle     I have discussed the diagnosis with the patient and the intended plan of care as seen in the above orders. The patient has received an after-visit summary and questions were answered concerning future plans. I have discussed medication, side effects, and warnings with the patient in detail. The patient verbalized understanding and is in agreement with the plan of care. The patient will contact the office with any additional concerns.     lab results and schedule of future lab studies reviewed with patient    Bertha Christensen MD

## 2021-06-10 NOTE — PATIENT INSTRUCTIONS
Learning About Saving Energy When You Have a Chronic Condition Introduction Everyday tasks can be tiring when you have COPD, heart failure, or another long-term (chronic) condition. You may feel at times that you've lost your ability to live your life. But learning to conserve, or save, your energy can help you be less tired. Conserving your energy means finding ways of doing daily activities with as little effort as possible. With some small changes in the way you do things, you can get your tasks done more easily. Some treatments are available that might help. Pulmonary rehabilitation can teach you ways to breathe easier. Cardiac rehabilitation can help make your heart stronger. You also may want to see an occupational or physical therapist. The therapist can give you more tips on building strength and moving with less effort. What can you do to conserve your energy? Planning · Make a list of what you have to do every day. Group the tasks by location. · Do all the chores in one part of your house around the same time. · Go out for errands or do chores at the time of day when you have the most energy. · Plan rest periods into your day. Getting things done · Sit down as often as you can when you get dressed, do chores, or cook. · Use a cart with wheels to roll items, such as laundry, from one room to another. · Push or slide boxes or other large items instead of lifting them. Reaching and bending · Put things you use the most on shelves that are at the level of your waist or shoulder. · Use long-handled grabbers or other tools to reach items on a high shelf or to  things off the floor. Use long-handled dusters when you clean the house. · Use a raised toilet seat to avoid bending too far to sit or stand up. Eating · Eat several small meals instead of three larger meals. · If you get too tired to eat much, try to choose healthy foods that have more calories.  Have a yogurt-and-fruit smoothie for breakfast. Put avocado on a sandwich. Or add cheese or peanut butter to snacks. · If you don't feel very hungry, try to eat first and drink water or other fluids later, after a meal. This can help keep you from losing weight. Sip small amounts of fluids if you need to drink while you eat. Having sex · Choose the time of day when you have more energy. · A [id number]-[id number] position for sex can be less tiring. Sometimes you may want to focus more on caressing. Watch closely for changes in your health, and be sure to contact your doctor if you have any problems. Where can you learn more? Go to http://www.gray.com/ Enter H190 in the search box to learn more about \"Learning About Saving Energy When You Have a Chronic Condition. \" Current as of: October 26, 2020               Content Version: 12.8 © 4058-1211 [employer]. Care instructions adapted under license by [employer] (which disclaims liability or warranty for this information). If you have questions about a medical condition or this instruction, always ask your healthcare professional. [address] any warranty or liability for your use of this information.

## 2021-06-10 NOTE — PROGRESS NOTES
Chief Complaint   Patient presents with    Irregular Heart Beat    Transfer Of Care     Pt was in [facility] for AFIB. Was given amiodarone. She would also like to know if she should continue taking aldactone?

## 2021-06-16 ENCOUNTER — PATIENT OUTREACH (OUTPATIENT)
Dept: CASE MANAGEMENT | Age: 66
End: 2021-06-16

## 2021-06-16 NOTE — LETTER
Dear Joe Josue    My name is [facility] and I am a Care Transition Nurse who partners with Jolanta Mcdermott MD to improve patients' health. I've been trying to reach you via phone to let you know that, as a member of your care team, I will work with other providers involved in your care, offer education for your specific health conditions, and connect you with more resources as needed. This program is designed to provide you with the opportunity to have a ([address] [facility]/[address]) care manager partner with you for the following situations:     1) if you come home from the hospital or emergency room   2) to help manage your disease   3) when you would like assistance coordinating services or appointments    This added support is provided at no additional cost to you. My primary focus is to help you achieve specific goals and improve your health. Please call me at [phone] to further discuss how I can support your health care needs.     Sincerely,    Lissette Suarez, RN  Care Transitions Nurse  [phone]

## 2021-06-16 NOTE — PROGRESS NOTES
Care Transitions Initial Call    Call within 2 business days of discharge: Yes     Patient: Neda Coats Patient : 1955 MRN: [id number]    Discharge Diagnosis:   Dizziness secondary to recurrent atrial tachycardia;   CAD s/p CABG x 2 2021   PAD s/p stent   Elevated troponin-type II demand ischemia   No chest pain   Chronic diastolic heart failure   Pulmonary hypertension   History of IJ thrombus-Eliquis DC on previous admission   ESRD on hemodialysis    Essential hypertension   Hyperlipidemia   Type 2 diabetes mellitus   Anemia of chronic kidney disease   Left upper lobe pulmonary nodule 3 mm-low risk patient optional to CT chest in 12 months for follow up with PCP      Was this an external facility discharge? Yes, 21 Discharge Facility: [facility] follow up appointment(s):   Future Appointments   Date Time Provider Jenise Ahumada   2021  9:00 AM Barbra Kitchen MD [facility] BS [facility]     Non-BS follow up appointment(s): Dialysis M,W,F    Call placed to patient at preferred number. Attempted to reach patient for [facility] and hospital follow up. No answer and there was no way to leave a message because voice mail full. Goals      Prevent complications post hospitalization. 1. CTN will monitor X 4 weeks    2. Ensure provider appt is scheduled within 7 days post-discharge  2021 CTN sent request to PCP for [facility] appointment  3. Confirm patient attended post-discharge provider apt    4. Complete post-visit call to confirm attendance and update care needs      5. Review/educate common or potential \"red flags\" of condition worsening    6. Evaluate adherence to medications and priority barriers to resolve        7. Instruct on adherence to medications as ordered and assess for therapeutic response and side-effects         8. Discuss and evaluate ADL performance.   Provide recommendations on energy conservation, particularly related to transition home from an inpatient admission. Unable to reach patient x 3 attempts. Letter sent to patient and episode closed.

## 2021-09-16 DIAGNOSIS — I10 ESSENTIAL HYPERTENSION: ICD-10-CM

## 2021-09-16 RX ORDER — METOPROLOL TARTRATE 25 MG/1
TABLET, FILM COATED ORAL
Qty: 360 TABLET | Refills: 0 | Status: SHIPPED | OUTPATIENT
Start: 2021-09-16 | End: 2022-07-07 | Stop reason: SDUPTHER

## 2021-09-21 ENCOUNTER — OFFICE VISIT (OUTPATIENT)
Dept: FAMILY MEDICINE CLINIC | Age: 66
End: 2021-09-21
Payer: MEDICARE

## 2021-09-21 VITALS
DIASTOLIC BLOOD PRESSURE: 78 MMHG | OXYGEN SATURATION: 98 % | HEART RATE: 78 BPM | RESPIRATION RATE: 12 BRPM | TEMPERATURE: 98 F | SYSTOLIC BLOOD PRESSURE: 126 MMHG

## 2021-09-21 DIAGNOSIS — E11.42 DIABETIC PERIPHERAL NEUROPATHY ASSOCIATED WITH TYPE 2 DIABETES MELLITUS (HCC): ICD-10-CM

## 2021-09-21 DIAGNOSIS — E11.621 TYPE 2 DIABETES MELLITUS WITH FOOT ULCER (CODE) (HCC): Primary | ICD-10-CM

## 2021-09-21 DIAGNOSIS — Z86.73 HISTORY OF CVA (CEREBROVASCULAR ACCIDENT): ICD-10-CM

## 2021-09-21 LAB — HBA1C MFR BLD HPLC: 6.8 %

## 2021-09-21 PROCEDURE — G8432 DEP SCR NOT DOC, RNG: HCPCS | Performed by: FAMILY MEDICINE

## 2021-09-21 PROCEDURE — G8752 SYS BP LESS 140: HCPCS | Performed by: FAMILY MEDICINE

## 2021-09-21 PROCEDURE — 3044F HG A1C LEVEL LT 7.0%: CPT | Performed by: FAMILY MEDICINE

## 2021-09-21 PROCEDURE — G8536 NO DOC ELDER MAL SCRN: HCPCS | Performed by: FAMILY MEDICINE

## 2021-09-21 PROCEDURE — 3017F COLORECTAL CA SCREEN DOC REV: CPT | Performed by: FAMILY MEDICINE

## 2021-09-21 PROCEDURE — G8421 BMI NOT CALCULATED: HCPCS | Performed by: FAMILY MEDICINE

## 2021-09-21 PROCEDURE — G8400 PT W/DXA NO RESULTS DOC: HCPCS | Performed by: FAMILY MEDICINE

## 2021-09-21 PROCEDURE — G8754 DIAS BP LESS 90: HCPCS | Performed by: FAMILY MEDICINE

## 2021-09-21 PROCEDURE — 83036 HEMOGLOBIN GLYCOSYLATED A1C: CPT | Performed by: FAMILY MEDICINE

## 2021-09-21 PROCEDURE — G8427 DOCREV CUR MEDS BY ELIG CLIN: HCPCS | Performed by: FAMILY MEDICINE

## 2021-09-21 PROCEDURE — 1090F PRES/ABSN URINE INCON ASSESS: CPT | Performed by: FAMILY MEDICINE

## 2021-09-21 PROCEDURE — 2022F DILAT RTA XM EVC RTNOPTHY: CPT | Performed by: FAMILY MEDICINE

## 2021-09-21 PROCEDURE — 99214 OFFICE O/P EST MOD 30 MIN: CPT | Performed by: FAMILY MEDICINE

## 2021-09-21 PROCEDURE — 1101F PT FALLS ASSESS-DOCD LE1/YR: CPT | Performed by: FAMILY MEDICINE

## 2021-09-21 RX ORDER — SEVELAMER HYDROCHLORIDE 800 MG/1
800 TABLET, FILM COATED ORAL
Qty: 90 TABLET | Refills: 3 | Status: SHIPPED | OUTPATIENT
Start: 2021-09-21 | End: 2022-05-31 | Stop reason: SDUPTHER

## 2021-09-21 NOTE — PATIENT INSTRUCTIONS
Learning About Tests When You Have Diabetes  Why do you need regular tests? Diabetes can lead to other health problems if it's not well controlled. You'll need tests to monitor how well your diabetes is controlled and to check for other things like high cholesterol or kidney problems. Having tests on a regular schedule can help your doctor find problems early, when it's easier to manage them. What tests do you need? These are the tests you may need and how often you should have them. A1c blood test.   This test shows the average level of blood sugar over the past 2 to 3 months. It helps your doctor see whether blood sugar levels have been staying within your target range. · How often: Every 3 to 6 months  · Goal: A blood sugar level in your target range  Blood pressure test.   This test measures the pressure of blood flow in the arteries. Controlling blood pressure can help prevent damage to nerves and blood vessels. · How often: Every 3 to 6 months  · Goal: A blood pressure level in your target range  Cholesterol test.   This test measures the amount of a type of fat in the blood. It is common for people with diabetes to also have high cholesterol. Too much cholesterol in the blood can build up inside the blood vessels and raise the risk for heart attack and stroke. · How often: At the time of your diabetes diagnosis, and as often as your doctor recommends after that  · Goal: A cholesterol level in your target range  Albumin-creatinine ratio test.   This test checks for kidney damage by looking for the protein albumin (say \"al-BYOO-dylon\") in the urine. Albumin is normally found in the blood. Kidney damage can let small amounts of it (microalbumin) leak into the urine. · How often: Once a year  · Goal: No protein in the urine  Blood creatinine test/estimated glomerular filtration (eGFR). The blood creatinine (say \"xjze-SY-vh-neen\") level shows how well your kidneys are working.  Creatinine is a waste product that muscles release into the blood. Blood creatinine is used to estimate the glomerular filtration rate. A high level of creatinine and/or a low eGFR may mean your kidneys are not working as well as they should. · How often: Once a year  · Goal: Normal level of creatinine in the blood. The eGFR goal is greater than 60 mL/min/1.73 m². Complete foot exam.   The doctor checks for foot sores and whether any sensation has been lost.  · How often: Once a year  · Goal: Healthy feet with no foot ulcers or loss of feeling  Dental exam and cleaning. The dentist checks for gum disease and tooth decay. People with high blood sugar are more likely to have these problems. · How often: Every 6 months  · Goal: Healthy teeth and gums  Complete eye exam.   High blood sugar levels can damage the eyes. This exam is done by an ophthalmologist or optometrist. It includes a dilated eye exam. The exam shows whether there's damage to the back of the eye (diabetic retinopathy). · How often: Once a year. If you don't have any signs of diabetic retinopathy, your doctor may recommend an exam every 2 years. · Goal: No damage to the back of the eye  Thyroid-stimulating hormone (TSH) blood test.   This test checks for thyroid disease. Too little thyroid hormone can cause some medicines (like insulin) to stay in the body longer. This can cause low blood sugar. You may be tested if you have high cholesterol or are a woman over 48years old. · How often: As part of your diabetes diagnosis, and as often as your doctor recommends after that  · Goal: Normal level of TSH in the blood  Follow-up care is a key part of your treatment and safety. Be sure to make and go to all appointments, and call your doctor if you are having problems. It's also a good idea to know your test results and keep a list of the medicines you take. Where can you learn more?   Go to http://www.[employer].com/  Enter A243 in the search box to learn more about \"Learning About Tests When You Have Diabetes. \"  Current as of: August 31, 2020               Content Version: 13.0  © 2041-9988 [employer]. Care instructions adapted under license by [employer] (which disclaims liability or warranty for this information). If you have questions about a medical condition or this instruction, always ask your healthcare professional. [address] any warranty or liability for your use of this information.

## 2021-09-21 NOTE — PROGRESS NOTES
Chief Complaint   Patient presents with    Hypertension    Diabetes     Pt was given a 30 day eliquis when she had a ablation done on 9/7. She sees him end of Oct. She wants to see if she can get one more fill of the eliquis.

## 2021-09-21 NOTE — PROGRESS NOTES
Pratik Perales is a 74 y.o. female  presents for follow up on DM and history of CVA. No Known Allergies  Outpatient Medications Marked as Taking for the 9/21/21 encounter (Office Visit) with Aditi Gonzales MD   Medication Sig Dispense Refill    apixaban (ELIQUIS) 5 mg tablet Take 1 Tablet by mouth two (2) times a day. 60 Tablet 0    sevelamer (RENAGEL) 800 mg tablet Take 1 Tablet by mouth three (3) times daily (with meals).  90 Tablet 3     Patient Active Problem List   Diagnosis Code    Hypertension I10    Hyperlipidemia E78.5    Diabetic retinopathy associated with type 2 diabetes mellitus ([facility] E11.319    Diabetic peripheral neuropathy associated with type 2 diabetes mellitus (HCC) E11.42    Charcot's joint arthropathy in type 2 diabetes mellitus ([facility] E11.610    Mass of left breast on mammogram N63.20    ACP (advance care planning) Z71.89    Obesity, morbid (HCC) E66.01    Congestive heart failure ([facility]) I50.9     Past Medical History:   Diagnosis Date    Burning with urination     Charcot's joint disease due to secondary diabetes ([address] 2009    Diabetes ([address]     Diabetic peripheral neuropathy ([facility]     Diabetic retinopathy ([facility]     H/O transfusion of packed red blood cells 2009    Hypercholesterolemia     Hypertension      Social History     Socioeconomic History    Marital status:      Spouse name: Not on file    Number of children: Not on file    Years of education: Not on file    Highest education level: Not on file   Tobacco Use    Smoking status: Never Smoker    Smokeless tobacco: Never Used   Substance and Sexual Activity    Alcohol use: Yes     Comment: rare    Drug use: No    Sexual activity: Yes     Partners: Male     Birth control/protection: None     Social Determinants of Health     Financial Resource Strain:     Difficulty of Paying Living Expenses:    Food Insecurity:     Worried About Running Out of Food in the Last Year:     Zeynep of Food in the Last Year:    Transportation Needs:     Lack of Transportation (Medical):  Lack of Transportation (Non-Medical):    Physical Activity:     Days of Exercise per Week:     Minutes of Exercise per Session:    Stress:     Feeling of Stress :    Social Connections:     Frequency of Communication with Friends and Family:     Frequency of Social Gatherings with Friends and Family:     Attends Mandaeism Services:     Active Member of Clubs or Organizations:     Attends Club or Organization Meetings:     Marital Status:      Family History   Problem Relation Age of Onset    Hypertension Mother     Stroke Mother         TIA    Arthritis-osteo Mother     Cancer Mother         cytoblastoma    Hypertension Father     Diabetes Father     Heart Disease Father     Cancer Sister         colon    Diabetes Sister         Review of Systems   Constitutional: Negative for chills, fever, malaise/fatigue and weight loss. Eyes: Negative for blurred vision. Respiratory: Negative for cough, shortness of breath and wheezing. Cardiovascular: Negative for chest pain. Gastrointestinal: Negative for nausea and vomiting. Musculoskeletal: Negative for myalgias. Skin: Negative for rash. Neurological: Negative for weakness. Psychiatric/Behavioral: Negative. Vitals:    09/21/21 1120   BP: 126/78   Pulse: 78   Resp: 12   Temp: 98 °F (36.7 °C)   SpO2: 98%   PainSc:   0 - No pain       Physical Exam  Vitals and nursing note reviewed. Neck:      Thyroid: No thyromegaly. Cardiovascular:      Rate and Rhythm: Normal rate and regular rhythm. Heart sounds: Normal heart sounds. Pulmonary:      Effort: Pulmonary effort is normal.      Breath sounds: Normal breath sounds. Musculoskeletal:         General: Normal range of motion. Cervical back: Normal range of motion and neck supple. Skin:     General: Skin is warm and dry. Neurological:      General: No focal deficit present.       Mental Status: She is alert and oriented to person, place, and time. Psychiatric:         Mood and Affect: Mood normal.         Behavior: Behavior normal.         Thought Content: Thought content normal.         Judgment: Judgment normal.         Assessment/Plan      ICD-10-CM ICD-9-CM    1. Type 2 diabetes mellitus with foot ulcer (CODE) ([facility])  E11.621 250.80 AMB POC HEMOGLOBIN A1C     707.15    2. History of CVA (cerebrovascular accident)  Z86.73 V12.54 apixaban (ELIQUIS) 5 mg tablet   3. Diabetic peripheral neuropathy associated with type 2 diabetes mellitus ([facility])  E11.42 250.60 sevelamer (RENAGEL) 800 mg tablet     357.2      Follow-up and Dispositions    · Return in about 6 months (around 3/21/2022). I have discussed the diagnosis with the patient and the intended plan of care as seen in the above orders. The patient has received an after-visit summary and questions were answered concerning future plans. I have discussed medication, side effects, and warnings with the patient in detail. The patient verbalized understanding and is in agreement with the plan of care. The patient will contact the office with any additional concerns.       lab results and schedule of future lab studies reviewed with patient    New Porter MD

## 2021-11-18 DIAGNOSIS — Z86.73 HISTORY OF CVA (CEREBROVASCULAR ACCIDENT): ICD-10-CM

## 2021-11-18 NOTE — TELEPHONE ENCOUNTER
This pharmacy faxed over request for the following prescriptions to be filled:    Medication requested:   Requested Prescriptions     Pending Prescriptions Disp Refills    hydrALAZINE (APRESOLINE) 50 mg tablet       Sig: Take 1 Tablet by mouth. PCP: Dr. Geraldine Waggoner or Print: [facility]  Mail order or Local pharmacy [phone]    Scheduled appointment if not seen by current providers in office: LOV 09/21/21 Upcoming 03/21/22. New RX request  Is in Dr. Ishmael Montgomery , for any questions.

## 2021-11-19 RX ORDER — APIXABAN 5 MG/1
TABLET, FILM COATED ORAL
Qty: 60 TABLET | Refills: 0 | Status: SHIPPED | OUTPATIENT
Start: 2021-11-19 | End: 2022-01-05 | Stop reason: SDUPTHER

## 2021-11-23 RX ORDER — HYDRALAZINE HYDROCHLORIDE 50 MG/1
50 TABLET, FILM COATED ORAL
OUTPATIENT
Start: 2021-11-23

## 2021-12-06 RX ORDER — HYDRALAZINE HYDROCHLORIDE 50 MG/1
50 TABLET, FILM COATED ORAL 4 TIMES DAILY
Qty: 90 TABLET | Refills: 0 | Status: SHIPPED | OUTPATIENT
Start: 2021-12-06 | End: 2022-01-26 | Stop reason: SDUPTHER

## 2022-01-05 DIAGNOSIS — Z86.73 HISTORY OF CVA (CEREBROVASCULAR ACCIDENT): ICD-10-CM

## 2022-01-05 NOTE — TELEPHONE ENCOUNTER
This patient contacted office for the following prescriptions to be filled: short term supply. Medication requested:   Requested Prescriptions     Pending Prescriptions Disp Refills    apixaban (Eliquis) 5 mg tablet 60 Tablet 0     Sig: Take 1 Tablet by mouth two (2) times a day.        PCP: Dr. Justine Michaud or Print: [place]  Mail order or Local pharmacy [phone]     Scheduled appointment if not seen by current providers in office:LOV 09/21/21 Upcoming 03/21/22

## 2022-01-25 DIAGNOSIS — Z86.73 HISTORY OF CVA (CEREBROVASCULAR ACCIDENT): ICD-10-CM

## 2022-01-25 NOTE — TELEPHONE ENCOUNTER
Received a fax from [facility] requesting a new RX request for hydrALAZINE (APRESOLINE) 50 mg tablet . Please review and advise. New request form is in Dr. Malena Esparza.

## 2022-01-26 RX ORDER — APIXABAN 5 MG/1
TABLET, FILM COATED ORAL
Qty: 60 TABLET | Refills: 0 | Status: SHIPPED | OUTPATIENT
Start: 2022-01-26 | End: 2022-03-10

## 2022-01-26 RX ORDER — HYDRALAZINE HYDROCHLORIDE 50 MG/1
50 TABLET, FILM COATED ORAL 4 TIMES DAILY
Qty: 90 TABLET | Refills: 0 | Status: SHIPPED | OUTPATIENT
Start: 2022-01-26 | End: 2022-01-28 | Stop reason: SDUPTHER

## 2022-01-28 RX ORDER — HYDRALAZINE HYDROCHLORIDE 50 MG/1
50 TABLET, FILM COATED ORAL 4 TIMES DAILY
Qty: 90 TABLET | Refills: 0 | Status: SHIPPED | OUTPATIENT
Start: 2022-01-28 | End: 2022-03-10

## 2022-01-28 NOTE — TELEPHONE ENCOUNTER
----- Message from Radha Johnson sent at 1/28/2022 11:02 AM EST -----  Subject: Refill Request    QUESTIONS  Name of Medication? hydrALAZINE (APRESOLINE) 50 mg tablet  Patient-reported dosage and instructions? Take 1 Tablet by mouth four (4)   times daily. How many days do you have left? 0  Preferred Pharmacy? [facility] [id number]  Pharmacy phone number (if available)? [phone]  ---------------------------------------------------------------------------  --------------  CALL BACK INFO  What is the best way for the office to contact you? OK to leave message on   voicemail  Preferred Call Back Phone Number?  [phone]

## 2022-02-01 ENCOUNTER — TELEPHONE (OUTPATIENT)
Dept: FAMILY MEDICINE CLINIC | Age: 67
End: 2022-02-01

## 2022-02-01 NOTE — TELEPHONE ENCOUNTER
Patients  was here today for his visit and says his wife sees Dr. Irish Szymanski who has faxed paperwork for her to get new shoes. He is asking if this can be completed and faxed back to Dr. Irish Szymanski as soon as possible.

## 2022-02-15 ENCOUNTER — TELEPHONE (OUTPATIENT)
Dept: FAMILY MEDICINE CLINIC | Age: 67
End: 2022-02-15

## 2022-02-15 NOTE — TELEPHONE ENCOUNTER
----- Message from Moris Guy sent at 2/15/2022 10:14 AM EST -----  Subject: Message to Provider    QUESTIONS  Information for Provider? Pt is calling in for paperwork should be sent   over to Dr. Reji Black for pt Medicare shoes. PT need to be fitted for a new   pair of shoes. Pt was present at doc office last week to get paperwork   signed by Joesph Kussmaul. Dr Reji Black has not yet received any paperwork to   get pt started with the fitting of her shoes   ---------------------------------------------------------------------------  --------------  CALL BACK INFO  What is the best way for the office to contact you? OK to leave message on   voicemail  Preferred Call Back Phone Number? [phone]  ---------------------------------------------------------------------------  --------------  SCRIPT ANSWERS  Relationship to Patient?  Self

## 2022-02-24 DIAGNOSIS — Z12.31 ENCOUNTER FOR SCREENING MAMMOGRAM FOR MALIGNANT NEOPLASM OF BREAST: Primary | ICD-10-CM

## 2022-03-10 DIAGNOSIS — Z86.73 HISTORY OF CVA (CEREBROVASCULAR ACCIDENT): ICD-10-CM

## 2022-03-10 RX ORDER — APIXABAN 5 MG/1
TABLET, FILM COATED ORAL
Qty: 60 TABLET | Refills: 0 | Status: SHIPPED | OUTPATIENT
Start: 2022-03-10 | End: 2022-04-14

## 2022-03-10 RX ORDER — HYDRALAZINE HYDROCHLORIDE 50 MG/1
TABLET, FILM COATED ORAL
Qty: 90 TABLET | Refills: 0 | Status: SHIPPED | OUTPATIENT
Start: 2022-03-10 | End: 2022-03-28

## 2022-03-18 PROBLEM — Z71.89 ACP (ADVANCE CARE PLANNING): Status: ACTIVE | Noted: 2017-10-27

## 2022-03-18 PROBLEM — E66.01 OBESITY, MORBID (HCC): Status: ACTIVE | Noted: 2018-01-19

## 2022-03-19 PROBLEM — N63.20 MASS OF LEFT BREAST ON MAMMOGRAM: Status: ACTIVE | Noted: 2017-02-15

## 2022-03-19 PROBLEM — I50.9 CONGESTIVE HEART FAILURE (HCC): Status: ACTIVE | Noted: 2018-02-12

## 2022-03-28 RX ORDER — HYDRALAZINE HYDROCHLORIDE 50 MG/1
TABLET, FILM COATED ORAL
Qty: 90 TABLET | Refills: 0 | Status: SHIPPED | OUTPATIENT
Start: 2022-03-28 | End: 2022-07-28

## 2022-03-29 ENCOUNTER — OFFICE VISIT (OUTPATIENT)
Dept: FAMILY MEDICINE CLINIC | Age: 67
End: 2022-03-29
Payer: MEDICARE

## 2022-03-29 VITALS
BODY MASS INDEX: 27.94 KG/M2 | HEIGHT: 67 IN | OXYGEN SATURATION: 99 % | HEART RATE: 71 BPM | SYSTOLIC BLOOD PRESSURE: 132 MMHG | DIASTOLIC BLOOD PRESSURE: 68 MMHG | RESPIRATION RATE: 10 BRPM | WEIGHT: 178 LBS

## 2022-03-29 DIAGNOSIS — E11.3593 PROLIFERATIVE DIABETIC RETINOPATHY OF BOTH EYES WITHOUT MACULAR EDEMA ASSOCIATED WITH TYPE 2 DIABETES MELLITUS (HCC): ICD-10-CM

## 2022-03-29 DIAGNOSIS — E11.621 TYPE 2 DIABETES MELLITUS WITH FOOT ULCER (CODE) (HCC): ICD-10-CM

## 2022-03-29 DIAGNOSIS — D69.6 THROMBOCYTOPENIA, UNSPECIFIED (HCC): ICD-10-CM

## 2022-03-29 DIAGNOSIS — Z12.11 COLON CANCER SCREENING: ICD-10-CM

## 2022-03-29 DIAGNOSIS — E11.42 DIABETIC PERIPHERAL NEUROPATHY ASSOCIATED WITH TYPE 2 DIABETES MELLITUS (HCC): ICD-10-CM

## 2022-03-29 DIAGNOSIS — Z12.31 ENCOUNTER FOR SCREENING MAMMOGRAM FOR MALIGNANT NEOPLASM OF BREAST: ICD-10-CM

## 2022-03-29 DIAGNOSIS — I10 ESSENTIAL HYPERTENSION: ICD-10-CM

## 2022-03-29 DIAGNOSIS — K55.9 ISCHEMIC BOWEL DISEASE (HCC): ICD-10-CM

## 2022-03-29 DIAGNOSIS — Z00.00 MEDICARE ANNUAL WELLNESS VISIT, SUBSEQUENT: Primary | ICD-10-CM

## 2022-03-29 DIAGNOSIS — I50.22 CHRONIC SYSTOLIC CONGESTIVE HEART FAILURE (HCC): ICD-10-CM

## 2022-03-29 PROCEDURE — G8400 PT W/DXA NO RESULTS DOC: HCPCS | Performed by: FAMILY MEDICINE

## 2022-03-29 PROCEDURE — G8427 DOCREV CUR MEDS BY ELIG CLIN: HCPCS | Performed by: FAMILY MEDICINE

## 2022-03-29 PROCEDURE — G8432 DEP SCR NOT DOC, RNG: HCPCS | Performed by: FAMILY MEDICINE

## 2022-03-29 PROCEDURE — G8752 SYS BP LESS 140: HCPCS | Performed by: FAMILY MEDICINE

## 2022-03-29 PROCEDURE — 1101F PT FALLS ASSESS-DOCD LE1/YR: CPT | Performed by: FAMILY MEDICINE

## 2022-03-29 PROCEDURE — G0439 PPPS, SUBSEQ VISIT: HCPCS | Performed by: FAMILY MEDICINE

## 2022-03-29 PROCEDURE — 2022F DILAT RTA XM EVC RTNOPTHY: CPT | Performed by: FAMILY MEDICINE

## 2022-03-29 PROCEDURE — 3046F HEMOGLOBIN A1C LEVEL >9.0%: CPT | Performed by: FAMILY MEDICINE

## 2022-03-29 PROCEDURE — G8536 NO DOC ELDER MAL SCRN: HCPCS | Performed by: FAMILY MEDICINE

## 2022-03-29 PROCEDURE — G8419 CALC BMI OUT NRM PARAM NOF/U: HCPCS | Performed by: FAMILY MEDICINE

## 2022-03-29 PROCEDURE — G8754 DIAS BP LESS 90: HCPCS | Performed by: FAMILY MEDICINE

## 2022-03-29 PROCEDURE — 3017F COLORECTAL CA SCREEN DOC REV: CPT | Performed by: FAMILY MEDICINE

## 2022-03-29 PROCEDURE — G9899 SCRN MAM PERF RSLTS DOC: HCPCS | Performed by: FAMILY MEDICINE

## 2022-03-29 RX ORDER — AMLODIPINE BESYLATE 10 MG/1
TABLET ORAL
Qty: 90 TABLET | Refills: 3 | Status: SHIPPED | OUTPATIENT
Start: 2022-03-29 | End: 2022-05-31 | Stop reason: SDUPTHER

## 2022-03-29 NOTE — PROGRESS NOTES
Chief Complaint   Patient presents with    Diabetes    Annual Wellness Visit         This is the Subsequent Medicare Annual Wellness Exam, performed 12 months or more after the Initial AWV or the last Subsequent AWV    I have reviewed the patient's medical history in detail and updated the computerized patient record. Assessment/Plan   Education and counseling provided:  Are appropriate based on today's review and evaluation    1. Medicare annual wellness visit, subsequent  2. Essential hypertension  The following orders have not been finalized:  -     amLODIPine (NORVASC) 10 mg tablet       Depression Risk Factor Screening     3 most recent PHQ Screens 6/10/2021   Little interest or pleasure in doing things Not at all   Feeling down, depressed, irritable, or hopeless Not at all   Total Score PHQ 2 0       Alcohol & Drug Abuse Risk Screen    Do you average more than 1 drink per night or more than 7 drinks a week:  No    On any one occasion in the past three months have you have had more than 3 drinks containing alcohol:  No          Functional Ability and Level of Safety    Hearing: Hearing is good. Activities of Daily Living: The home contains: no safety equipment. Patient does total self care      Ambulation: with difficulty, uses a walker     Fall Risk:  Fall Risk Assessment, last 12 mths 3/29/2022   Able to walk? Yes   Fall in past 12 months? 0   Do you feel unsteady?  0   Are you worried about falling 0      Abuse Screen:  Patient is not abused       Cognitive Screening    Has your family/caregiver stated any concerns about your memory: no     Cognitive Screening: not done    Health Maintenance Due     Health Maintenance Due   Topic Date Due    Shingrix Vaccine Age 49> (1 of 2) Never done    Bone Densitometry (Dexa) Screening  Never done    MICROALBUMIN Q1  10/27/2018    Breast Cancer Screen Mammogram  10/31/2019    COVID-19 Vaccine (3 - Booster for Moderna series) 09/03/2021    Eye Exam Retinal or Dilated  12/19/2021       Patient Care Team   Patient Care Team:  Angle Gómez MD as PCP - General (Family Medicine)  Angle Gómez MD as PCP - [address] [place] [address] Provider  Chasidy Delacruz DPM (Podiatry)  Alan Barron MD (Ophthalmology)  Amy Calle MD (Ophthalmology)  Ladan Kay MD (Endocrinology)  Elida Gasca MD as Physician (Vascular Surgery)  Kaden Morse MD as Surgeon (Colon and Rectal Surgery)    History     Patient Active Problem List   Diagnosis Code    Hypertension I10    Hyperlipidemia E78.5    Diabetic retinopathy associated with type 2 diabetes mellitus ([address] E11.319    Diabetic peripheral neuropathy associated with type 2 diabetes mellitus ([address] E11.42    Charcot's joint arthropathy in type 2 diabetes mellitus ([address] E11.610    Mass of left breast on mammogram N63.20    ACP (advance care planning) Z71.89    Obesity, morbid ([address] E66.01    Congestive heart failure ([address] I50.9     Past Medical History:   Diagnosis Date    Burning with urination     Charcot's joint disease due to secondary diabetes ([address] 2009    Diabetes ([address]     Diabetic peripheral neuropathy ([address]     Diabetic retinopathy ([address]     H/O transfusion of packed red blood cells 2009    Hypercholesterolemia     Hypertension       Past Surgical History:   Procedure Laterality Date    COLONOSCOPY N/A 5/30/2018    COLONOSCOPY with biopsies performed by Pily Trevizo MD at [facility] HX CATARACT REMOVAL Bilateral 2003    HX COLONOSCOPY  2008    hx of polyps    HX ORTHOPAEDIC  2011    LT ANKLE-CHARCOT JOINT, metal renard placed    HX ORTHOPAEDIC      right ankle, metal renard placed    HX RETINAL DETACHMENT REPAIR Bilateral 2003     Current Outpatient Medications   Medication Sig Dispense Refill    hydrALAZINE (APRESOLINE) 50 mg tablet TAKE 1 TABLET FOUR TIMES DAILY 90 Tablet 0    Eliquis 5 mg tablet TAKE 1 TABLET TWICE DAILY 60 Tablet 0    sevelamer (RENAGEL) 800 mg tablet Take 1 Tablet by mouth three (3) times daily (with meals). 90 Tablet 3    metoprolol tartrate (LOPRESSOR) 25 mg tablet TAKE 2 TABLETS TWICE DAILY 360 Tablet 0    amiodarone (CORDARONE) 200 mg tablet Take 200 mg by mouth two (2) times daily (with meals).  amLODIPine (NORVASC) 10 mg tablet TAKE 1 TABLET EVERY DAY 90 Tablet 3    atorvastatin (LIPITOR) 40 mg tablet Take 1 Tablet by mouth daily. 90 Tablet 3    cinacalcet (SENSIPAR) 30 mg tablet Take 1 Tablet by mouth daily. 90 Tablet 3    cloNIDine (Catapres-TTS-1) 0.1 mg/24 hr ptwk 1 Patch by TransDERmal route every seven (7) days. 12 Patch 3    b complex-vitamin c-folic acid 0.8 mg (Dialyvite 800) 0.8 mg tab tablet Take 1 Tablet by mouth daily. 90 Tablet 3    Insulin Needles, Disposable, 31 gauge x 5/16\" ndle Use with pen daily 100 Package 3    Blood-Glucose Sensor (Dexcom G5-G4 Sensor) annette Use daily as directed 1 Device 2    Blood-Glucose Meter,Continuous (Dexcom G5 ) misc Use daily as directed 1 Each 2    aspirin delayed-release 81 mg tablet Take 1 Tab by mouth daily. 100 Tab 1    docusate sodium (COLACE) 50 mg capsule Take 50 mg by mouth two (2) times a day.  b complex-vitamin c-folic acid (Nephrocaps) 1 mg capsule Take 1 Cap by mouth daily.  flash glucose sensor (FreeStyle Griselda 14 Day Sensor) kit Use as directed. 1 Kit 0    flash glucose scanning reader (FreeStyle Griselda 14 Day [place]) misc USE 4 TIMES A DAY AS DIRECTED 1 Each 0    glucose blood VI test strips (Accu-Chek Mariel Plus test strp) strip Use bid 100 Strip 2    insulin glargine (TOUJEO SOLOSTAR) 300 unit/mL (1.5 mL) inpn 30 Units by SubCUTAneous route daily.  omega 3-dha-epa-fish oil (FISH OIL) 60- mg cap Take 500 mg by mouth two (2) times a day.  multivitamin (TAB-A-VINCENT) tablet Take 1 Tab by mouth daily.  Cholecalciferol, Vitamin D3, (VITAMIN D3) 1,000 unit cap Take  by mouth.       BD SINGLE USE SWABS REGULAR padm       BD INSULIN SYRINGE ULTRA-FINE 1 mL 30 x 1/2\" syrg        No Known Allergies    Family History   Problem Relation Age of Onset    Hypertension Mother     Stroke Mother         TIA    OSTEOARTHRITIS Mother     Cancer Mother         cytoblastoma    Hypertension Father     Diabetes Father     Heart Disease Father     Cancer Sister         colon    Diabetes Sister      Social History     Tobacco Use    Smoking status: Never Smoker    Smokeless tobacco: Never Used   Substance Use Topics    Alcohol use: Yes     Comment: ramya Mckinney LPN

## 2022-03-29 NOTE — PROGRESS NOTES
Addie Bartlett is a 69 y.o. female  presents for follow up on DM Lipids HTN  And wellness. No new complaints. No Known Allergies    Patient Active Problem List   Diagnosis Code    Hypertension I10    Hyperlipidemia E78.5    Diabetic retinopathy associated with type 2 diabetes mellitus ([address] E11.319    Diabetic peripheral neuropathy associated with type 2 diabetes mellitus ([address] E11.42    Charcot's joint arthropathy in type 2 diabetes mellitus ([address] E11.610    Mass of left breast on mammogram N63.20    ACP (advance care planning) Z71.89    Obesity, morbid ([address] E66.01    Congestive heart failure ([address] I50.9     Past Medical History:   Diagnosis Date    Burning with urination     Charcot's joint disease due to secondary diabetes ([address] 2009    Diabetes ([address]     Diabetic peripheral neuropathy ([address]     Diabetic retinopathy ([address]     H/O transfusion of packed red blood cells 2009    Hypercholesterolemia     Hypertension      Social History     Socioeconomic History    Marital status:    Tobacco Use    Smoking status: Never Smoker    Smokeless tobacco: Never Used   Substance and Sexual Activity    Alcohol use: Yes     Comment: rare    Drug use: No    Sexual activity: Yes     Partners: Male     Birth control/protection: None     Family History   Problem Relation Age of Onset    Hypertension Mother     Stroke Mother         TIA    OSTEOARTHRITIS Mother     Cancer Mother         cytoblastoma    Hypertension Father     Diabetes Father     Heart Disease Father     Cancer Sister         colon    Diabetes Sister         Review of Systems   Constitutional: Negative for chills, fever, malaise/fatigue and weight loss. Eyes: Negative for blurred vision. Respiratory: Negative for cough, shortness of breath and wheezing. Cardiovascular: Negative for chest pain. Gastrointestinal: Negative for nausea and vomiting. Musculoskeletal: Negative for myalgias.    Skin: Negative for rash.   Neurological: Negative for weakness. Psychiatric/Behavioral: Negative. Vitals:    03/29/22 0932   BP: 132/68   Pulse: 71   Resp: 10   SpO2: 99%   Weight: 178 lb (80.7 kg)   Height: 5' 7\" (1.702 m)   PainSc:   0 - No pain       Physical Exam  Vitals and nursing note reviewed. Constitutional:       Appearance: Normal appearance. Neck:      Thyroid: No thyromegaly. Cardiovascular:      Rate and Rhythm: Normal rate and regular rhythm. Pulses: Normal pulses. Heart sounds: Normal heart sounds. Pulmonary:      Effort: Pulmonary effort is normal.      Breath sounds: Normal breath sounds. Musculoskeletal:         General: Normal range of motion. Cervical back: Normal range of motion and neck supple. Skin:     General: Skin is warm and dry. Neurological:      Mental Status: She is alert and oriented to person, place, and time. Psychiatric:         Mood and Affect: Mood normal.         Behavior: Behavior normal.         Thought Content: Thought content normal.         Judgment: Judgment normal.         Assessment/Plan      ICD-10-CM ICD-9-CM    1. Medicare annual wellness visit, subsequent  Z00.00 V70.0    2. Essential hypertension  I10 401.9 amLODIPine (NORVASC) 10 mg tablet   3. Type 2 diabetes mellitus with foot ulcer (CODE) ([facility])  E11.621 250.80 LIPID PANEL     707.15 CBC WITH AUTOMATED DIFF      METABOLIC PANEL, COMPREHENSIVE      HEMOGLOBIN A1C WITH EAG   4. Chronic systolic congestive heart failure (HCC)  I50.22 428.22      428.0    5. Diabetic peripheral neuropathy associated with type 2 diabetes mellitus ([facility])  E11.42 250.60      357.2    6. Ischemic bowel disease ([facility])  K55.9 557.9    7. Proliferative diabetic retinopathy of both eyes without macular edema associated with type 2 diabetes mellitus ([facility]  E11.3593 250.50      362.02    8. Thrombocytopenia, unspecified ([facility])  D69.6 287.5    9. Colon cancer screening  Z12.11 V76.51 REFERRAL TO GASTROENTEROLOGY   10. Encounter for screening mammogram for malignant neoplasm of breast  Z12.31 V76.12 WHITNEY MAMMO BI SCREENING INCL CAD     I have discussed the diagnosis with the patient and the intended plan of care as seen in the above orders. The patient has received an after-visit summary and questions were answered concerning future plans. I have discussed medication, side effects, and warnings with the patient in detail. The patient verbalized understanding and is in agreement with the plan of care. The patient will contact the office with any additional concerns.         lab results and schedule of future lab studies reviewed with patient    Samina Xiao MD

## 2022-03-29 NOTE — PATIENT INSTRUCTIONS
Medicare Wellness Visit, Female     The best way to live healthy is to have a lifestyle where you eat a well-balanced diet, exercise regularly, limit alcohol use, and quit all forms of tobacco/nicotine, if applicable. Regular preventive services are another way to keep healthy. Preventive services (vaccines, screening tests, monitoring & exams) can help personalize your care plan, which helps you manage your own care. Screening tests can find health problems at the earliest stages, when they are easiest to treat. [facility] follows the current, evidence-based guidelines published by the [facility] ([facility]) when recommending preventive services for our patients. Because we follow these guidelines, sometimes recommendations change over time as research supports it. (For example, mammograms used to be recommended annually. Even though Medicare will still pay for an annual mammogram, the newer guidelines recommend a mammogram every two years for women of average risk). Of course, you and your doctor may decide to screen more often for some diseases, based on your risk and your co-morbidities (chronic disease you are already diagnosed with). Preventive services for you include:  - Medicare offers their members a free annual wellness visit, which is time for you and your primary care provider to discuss and plan for your preventive service needs. Take advantage of this benefit every year!  -All adults over the age of 72 should receive the recommended pneumonia vaccines. Current USPSTF guidelines recommend a series of two vaccines for the best pneumonia protection.   -All adults should have a flu vaccine yearly and a tetanus vaccine every 10 years.   -All adults age 48 and older should receive the shingles vaccines (series of two vaccines).       -All adults age 38-68 who are overweight should have a diabetes screening test once every three years.   -All adults born between [birth year] and 1965 should be screened once for Hepatitis C.  -Other screening tests and preventive services for persons with diabetes include: an eye exam to screen for diabetic retinopathy, a kidney function test, a foot exam, and stricter control over your cholesterol.   -Cardiovascular screening for adults with routine risk involves an electrocardiogram (ECG) at intervals determined by your doctor.   -Colorectal cancer screenings should be done for adults age 54-65 with no increased risk factors for colorectal cancer. There are a number of acceptable methods of screening for this type of cancer. Each test has its own benefits and drawbacks. Discuss with your doctor what is most appropriate for you during your annual wellness visit. The different tests include: colonoscopy (considered the best screening method), a fecal occult blood test, a fecal DNA test, and sigmoidoscopy.    -A bone mass density test is recommended when a woman turns 65 to screen for osteoporosis. This test is only recommended one time, as a screening. Some providers will use this same test as a disease monitoring tool if you already have osteoporosis. -Breast cancer screenings are recommended every other year for women of normal risk, age 54-69.  -Cervical cancer screenings for women over age 72 are only recommended with certain risk factors.      Here is a list of your current Health Maintenance items (your personalized list of preventive services) with a due date:  Health Maintenance Due   Topic Date Due    Shingles Vaccine (1 of 2) Never done    Bone Mineral Density   Never done    Albumin Urine Test  10/27/2018    Mammogram  10/31/2019    COVID-19 Vaccine (3 - Booster for Moderna series) 09/03/2021    Eye Exam  12/19/2021

## 2022-04-14 DIAGNOSIS — Z86.73 HISTORY OF CVA (CEREBROVASCULAR ACCIDENT): ICD-10-CM

## 2022-04-14 RX ORDER — APIXABAN 5 MG/1
TABLET, FILM COATED ORAL
Qty: 60 TABLET | Refills: 0 | Status: SHIPPED | OUTPATIENT
Start: 2022-04-14 | End: 2022-07-07 | Stop reason: SDUPTHER

## 2022-05-31 ENCOUNTER — TELEPHONE (OUTPATIENT)
Dept: FAMILY MEDICINE CLINIC | Age: 67
End: 2022-05-31

## 2022-05-31 DIAGNOSIS — E11.42 DIABETIC PERIPHERAL NEUROPATHY ASSOCIATED WITH TYPE 2 DIABETES MELLITUS (HCC): ICD-10-CM

## 2022-05-31 DIAGNOSIS — I10 ESSENTIAL HYPERTENSION: ICD-10-CM

## 2022-05-31 NOTE — TELEPHONE ENCOUNTER
This patient contacted office for the following prescriptions to be filled:    Medication requested :  Requested Prescriptions     Pending Prescriptions Disp Refills    cinacalcet (SENSIPAR) 30 mg tablet 90 Tablet 3     Sig: Take 1 Tablet by mouth daily.  amLODIPine (NORVASC) 10 mg tablet 90 Tablet 3     Sig: TAKE 1 TABLET EVERY DAY       PCP: Dr. Jyothi Frankel or Print: [address]  Mail order or Local pharmacy [phone]    Scheduled appointment if not seen by current providers in office: LOV 03/29/22 Upcoming scheduled 06/30/22.

## 2022-05-31 NOTE — TELEPHONE ENCOUNTER
This patient contacted office for the following prescriptions to be filled: at her Local [facility]     Medication requested :  Requested Prescriptions     Pending Prescriptions Disp Refills    sevelamer (RENAGEL) 800 mg tablet 90 Tablet 3     Sig: Take 1 Tablet by mouth three (3) times daily (with meals). PCP: Dr. Jose Nelson or Print: [facility]  Mail order or Local pharmacy [phone]    Scheduled appointment if not seen by current providers in office:LOV 03/29/22 Upcoming 06/30/22.

## 2022-06-01 RX ORDER — CINACALCET 30 MG/1
30 TABLET, FILM COATED ORAL DAILY
Qty: 90 TABLET | Refills: 3 | Status: SHIPPED | OUTPATIENT
Start: 2022-06-01 | End: 2022-07-27 | Stop reason: SDUPTHER

## 2022-06-01 RX ORDER — AMLODIPINE BESYLATE 10 MG/1
TABLET ORAL
Qty: 90 TABLET | Refills: 3 | Status: SHIPPED | OUTPATIENT
Start: 2022-06-01 | End: 2022-07-07 | Stop reason: SDUPTHER

## 2022-06-02 ENCOUNTER — TELEPHONE (OUTPATIENT)
Dept: MAMMOGRAPHY | Age: 67
End: 2022-06-02

## 2022-06-10 ENCOUNTER — TELEPHONE (OUTPATIENT)
Dept: FAMILY MEDICINE CLINIC | Age: 67
End: 2022-06-10

## 2022-06-10 RX ORDER — SEVELAMER HYDROCHLORIDE 800 MG/1
800 TABLET, FILM COATED ORAL
Qty: 90 TABLET | Refills: 3 | Status: SHIPPED | OUTPATIENT
Start: 2022-06-10 | End: 2022-06-16 | Stop reason: SDUPTHER

## 2022-06-10 NOTE — TELEPHONE ENCOUNTER
Fax received from [address] [place] [address] meds stating prior auth is required for the Sevelamer  MG Tablets. Submit a PA request  1. Go to key. [employer] and click \"Enter a Key\"  2. Patient last name: Ede Jiménez      : 1955      Key: [id number]  3.  Click \"start a PA\", complete the form, and \"send to plan\"

## 2022-06-16 ENCOUNTER — TELEPHONE (OUTPATIENT)
Dept: FAMILY MEDICINE CLINIC | Age: 67
End: 2022-06-16

## 2022-06-16 DIAGNOSIS — E11.42 DIABETIC PERIPHERAL NEUROPATHY ASSOCIATED WITH TYPE 2 DIABETES MELLITUS (HCC): ICD-10-CM

## 2022-06-16 RX ORDER — SEVELAMER HYDROCHLORIDE 800 MG/1
800 TABLET, FILM COATED ORAL
Qty: 90 TABLET | Refills: 3 | Status: SHIPPED | OUTPATIENT
Start: 2022-06-16 | End: 2022-07-07

## 2022-06-16 NOTE — TELEPHONE ENCOUNTER
This patient contacted office for the following prescriptions to be filled:    Last office visit: 3/29/22  Follow up appointment: 6/30/22 (if overdue call patient to schedule appointment)  Medication requested :   Requested Prescriptions     Pending Prescriptions Disp Refills    sevelamer (RENAGEL) 800 mg tablet 90 Tablet 3     Sig: Take 1 Tablet by mouth three (3) times daily (with meals). PCP: Jay Donald  Mail order or Local pharmacy name [facility]              Pt is requesting the medication SEVELAMER be sent over to the [facility] listed in the request. Please review and advise as soon as possible.

## 2022-06-16 NOTE — TELEPHONE ENCOUNTER
Fax received from [address] [place] [address] meds stating prior auth is required for the SEVELAMER HCL 800MG TAB. Submit a PA request  1. Go to key. [employer] and click \"Enter a Key\"  2. Patient last name: Marie Damico      : 1955      Key: [id number]  3. Click \"start a PA\", complete the form, and \"send to plan\"         Prior-auth received from [facility].

## 2022-07-07 ENCOUNTER — OFFICE VISIT (OUTPATIENT)
Dept: FAMILY MEDICINE CLINIC | Age: 67
End: 2022-07-07
Payer: MEDICARE

## 2022-07-07 VITALS
HEART RATE: 68 BPM | SYSTOLIC BLOOD PRESSURE: 142 MMHG | BODY MASS INDEX: 27.41 KG/M2 | TEMPERATURE: 96.2 F | OXYGEN SATURATION: 98 % | WEIGHT: 175 LBS | DIASTOLIC BLOOD PRESSURE: 58 MMHG | RESPIRATION RATE: 18 BRPM

## 2022-07-07 DIAGNOSIS — E11.42 DIABETIC PERIPHERAL NEUROPATHY ASSOCIATED WITH TYPE 2 DIABETES MELLITUS (HCC): Primary | ICD-10-CM

## 2022-07-07 DIAGNOSIS — I10 ESSENTIAL HYPERTENSION: ICD-10-CM

## 2022-07-07 DIAGNOSIS — E78.49 OTHER HYPERLIPIDEMIA: ICD-10-CM

## 2022-07-07 DIAGNOSIS — Z86.73 HISTORY OF CVA (CEREBROVASCULAR ACCIDENT): ICD-10-CM

## 2022-07-07 LAB — HBA1C MFR BLD HPLC: 8.2 %

## 2022-07-07 PROCEDURE — G8400 PT W/DXA NO RESULTS DOC: HCPCS | Performed by: FAMILY MEDICINE

## 2022-07-07 PROCEDURE — G8536 NO DOC ELDER MAL SCRN: HCPCS | Performed by: FAMILY MEDICINE

## 2022-07-07 PROCEDURE — G8417 CALC BMI ABV UP PARAM F/U: HCPCS | Performed by: FAMILY MEDICINE

## 2022-07-07 PROCEDURE — 1123F ACP DISCUSS/DSCN MKR DOCD: CPT | Performed by: FAMILY MEDICINE

## 2022-07-07 PROCEDURE — 1090F PRES/ABSN URINE INCON ASSESS: CPT | Performed by: FAMILY MEDICINE

## 2022-07-07 PROCEDURE — G8753 SYS BP > OR = 140: HCPCS | Performed by: FAMILY MEDICINE

## 2022-07-07 PROCEDURE — G8427 DOCREV CUR MEDS BY ELIG CLIN: HCPCS | Performed by: FAMILY MEDICINE

## 2022-07-07 PROCEDURE — 3052F HG A1C>EQUAL 8.0%<EQUAL 9.0%: CPT | Performed by: FAMILY MEDICINE

## 2022-07-07 PROCEDURE — 83036 HEMOGLOBIN GLYCOSYLATED A1C: CPT | Performed by: FAMILY MEDICINE

## 2022-07-07 PROCEDURE — 1101F PT FALLS ASSESS-DOCD LE1/YR: CPT | Performed by: FAMILY MEDICINE

## 2022-07-07 PROCEDURE — 3017F COLORECTAL CA SCREEN DOC REV: CPT | Performed by: FAMILY MEDICINE

## 2022-07-07 PROCEDURE — 2022F DILAT RTA XM EVC RTNOPTHY: CPT | Performed by: FAMILY MEDICINE

## 2022-07-07 PROCEDURE — G9899 SCRN MAM PERF RSLTS DOC: HCPCS | Performed by: FAMILY MEDICINE

## 2022-07-07 PROCEDURE — 99214 OFFICE O/P EST MOD 30 MIN: CPT | Performed by: FAMILY MEDICINE

## 2022-07-07 PROCEDURE — G8754 DIAS BP LESS 90: HCPCS | Performed by: FAMILY MEDICINE

## 2022-07-07 PROCEDURE — G8510 SCR DEP NEG, NO PLAN REQD: HCPCS | Performed by: FAMILY MEDICINE

## 2022-07-07 RX ORDER — ATORVASTATIN CALCIUM 40 MG/1
40 TABLET, FILM COATED ORAL DAILY
Qty: 90 TABLET | Refills: 3 | Status: SHIPPED | OUTPATIENT
Start: 2022-07-07

## 2022-07-07 RX ORDER — CLONIDINE 0.1 MG/24H
1 PATCH, EXTENDED RELEASE TRANSDERMAL
Qty: 12 PATCH | Refills: 3 | Status: SHIPPED | OUTPATIENT
Start: 2022-07-07

## 2022-07-07 RX ORDER — AMLODIPINE BESYLATE 10 MG/1
TABLET ORAL
Qty: 90 TABLET | Refills: 3 | Status: SHIPPED | OUTPATIENT
Start: 2022-07-07

## 2022-07-07 RX ORDER — METOPROLOL TARTRATE 25 MG/1
TABLET, FILM COATED ORAL
Qty: 360 TABLET | Refills: 1 | Status: SHIPPED | OUTPATIENT
Start: 2022-07-07

## 2022-07-07 RX ORDER — SEVELAMER CARBONATE 800 MG/1
800 TABLET, FILM COATED ORAL 3 TIMES DAILY
Qty: 90 TABLET | Refills: 5 | Status: SHIPPED | OUTPATIENT
Start: 2022-07-07 | End: 2022-07-07 | Stop reason: SDUPTHER

## 2022-07-07 RX ORDER — SEVELAMER CARBONATE 800 MG/1
TABLET, FILM COATED ORAL
Qty: 180 TABLET | Refills: 5 | Status: SHIPPED | OUTPATIENT
Start: 2022-07-07

## 2022-07-07 NOTE — PATIENT INSTRUCTIONS
Learning About Meal Planning for Diabetes  Why plan your meals? Meal planning can be a key part of managing diabetes. Planning meals and snacks with the right balance of carbohydrate, protein, and fat can help you keep your blood sugar at the target level you set with your doctor. You don't have to eat special foods. You can eat what your family eats, including sweets once in a while. But you do have to pay attention to how often you eat and how much you eat of certain foods. You may want to work with a dietitian or a diabetes educator. They can give you tips and meal ideas and can answer your questions about meal planning. This health professional can also help you reach a healthy weight if that is one of your goals. What plan is right for you? Your dietitian or diabetes educator may suggest that you start with the plate format or carbohydrate counting. The plate format  The plate format is a simple way to help you manage how you eat. You plan meals by learning how much space each food should take on a plate. Using the plate format helps you manage the amount of carbohydrate you eat. It can make it easier to keep your blood sugar level within your target range. It also helps you see if you're eating healthy portion sizes. To use the plate format, you put non-starchy vegetables on half your plate. Add lean protein foods, such as fish, lean meats and poultry, or soy products, on one-quarter of the plate. Put a grain or starchy vegetable (such as brown rice or a potato) on the final quarter of the plate. You can add a small piece of fruit and some low-fat or fat-free milk or yogurt, depending on your carbohydrate goal for each meal.  Here are some tips for using the plate format:  · Make sure that you are not using an oversized plate. A 9-inch plate is best. Many restaurants use larger plates. · Get used to using the plate format at home. Then you can use it when you eat out.   · Write down your questions about using the plate format. Talk to your doctor, a dietitian, or a diabetes educator about your concerns. Carbohydrate counting  With carbohydrate counting, you plan meals based on the amount of carbohydrate in each food. Carbohydrate raises blood sugar higher and more quickly than any other nutrient. It is found in desserts, breads and cereals, and fruit. It's also found in starchy vegetables such as potatoes and corn, grains such as rice and pasta, and milk and yogurt. You can help keep your blood sugar levels within your target range by planning how much carbohydrate to have at meals and snacks. The amount you need depends on several things. These include your weight, how active you are, which diabetes medicines you take, and what your goals are for your blood sugar levels. A registered dietitian or diabetes educator can help you plan how much carbohydrate to include in each meal and snack. An example of a carbohydrate counting plan is:  · 45 to 60 grams at each meal. That's about the same as 3 to 4 carbohydrate servings. · 15 to 20 grams at each snack. That's about the same as 1 carbohydrate serving. The Nutrition Facts label on packaged foods tells you how much carbohydrate is in a serving of the food. First, look at the serving size on the food label. Is that the amount you eat in a serving? All of the nutrition information on a food label is based on that serving size. So if you eat more or less than that, you'll need to adjust the other numbers. Total carbohydrate is the next thing you need to look for on the label. If you count carbohydrate servings, one serving of carbohydrate is 15 grams. For foods that don't come with labels, such as fresh fruits and vegetables, you'll need a guide that lists carbohydrate in these foods. Ask your doctor, dietitian, or diabetes educator about books or other nutrition guides you can use.   If you take insulin, you need to know how many grams of carbohydrate are in a meal. This lets you know how much rapid-acting insulin to take before you eat. If you use an insulin pump, you get a constant rate of insulin during the day. So the pump must be programmed at meals to give you extra insulin to cover the rise in blood sugar after meals. When you know how much carbohydrate you will eat, you can take the right amount of insulin. Or, if you always use the same amount of insulin, you need to make sure that you eat the same amount of carbohydrate at meals. If you need more help to understand carbohydrate counting and food labels, ask your doctor, dietitian, or diabetes educator. How can you plan healthy meals? Here are some tips to get started:  · Plan your meals a week at a time. Don't forget to include snacks too. · Use cookbooks or online recipes to plan several main meals. Plan some quick meals for busy nights. You also can double some recipes that freeze well. Then you can save half for other busy nights when you don't have time to cook. · Make sure you have the ingredients you need for your recipes. If you're running low on basic items, put these items on your shopping list too. · List foods that you use to make breakfasts, lunches, and snacks. List plenty of fruits and vegetables. · Post this list on the refrigerator. Add to it as you think of more things you need. · Take the list to the store to do your weekly shopping. Follow-up care is a key part of your treatment and safety. Be sure to make and go to all appointments, and call your doctor if you are having problems. It's also a good idea to know your test results and keep a list of the medicines you take. Where can you learn more? Go to http://www.gray.com/  Enter S877 in the search box to learn more about \"Learning About Meal Planning for Diabetes. \"  Current as of: September 8, 2021               Content Version: 13.2  © 8245-5252 Healthwise, [facility].    Care instructions adapted under license by [employer] (which disclaims liability or warranty for this information). If you have questions about a medical condition or this instruction, always ask your healthcare professional. [address] any warranty or liability for your use of this information.

## 2022-07-07 NOTE — PROGRESS NOTES
Chief Complaint   Patient presents with    Follow Up Chronic Condition     diabetes     Patient States she was perscribed Sevelamer HCL 800mg and the insurance will not cover this one, but the insurance will cover Sevelamer Carbonate.

## 2022-07-07 NOTE — PROGRESS NOTES
Luis Pisano is a 69 y.o. female  presents for diabetic nephropathy. No fever or chills    No Known Allergies  Outpatient Medications Marked as Taking for the 7/7/22 encounter (Office Visit) with Christian Wood MD   Medication Sig Dispense Refill    amLODIPine (NORVASC) 10 mg tablet TAKE 1 TABLET EVERY DAY 90 Tablet 3    Eliquis 5 mg tablet TAKE 1 TABLET TWICE DAILY 60 Tablet 0    metoprolol tartrate (LOPRESSOR) 25 mg tablet TAKE 2 TABLETS TWICE DAILY 360 Tablet 0    atorvastatin (LIPITOR) 40 mg tablet Take 1 Tablet by mouth daily. 90 Tablet 3    cloNIDine (Catapres-TTS-1) 0.1 mg/24 hr ptwk 1 Patch by TransDERmal route every seven (7) days. 12 Patch 3    Insulin Needles, Disposable, 31 gauge x 5/16\" ndle Use with pen daily 100 Package 3    Blood-Glucose Sensor (Dexcom G5-G4 Sensor) annette Use daily as directed 1 Device 2    Blood-Glucose Meter,Continuous (Dexcom G5 ) misc Use daily as directed 1 Each 2    aspirin delayed-release 81 mg tablet Take 1 Tab by mouth daily. 100 Tab 1    flash glucose sensor (FreeStyle Griselda 14 Day Sensor) kit Use as directed. 1 Kit 0    flash glucose scanning reader (FreeStyle Griselda 14 Day [place]) misc USE 4 TIMES A DAY AS DIRECTED 1 Each 0    glucose blood VI test strips (Accu-Chek Mariel Plus test strp) strip Use bid 100 Strip 2    insulin glargine (TOUJEO SOLOSTAR) 300 unit/mL (1.5 mL) inpn 30 Units by SubCUTAneous route daily.  multivitamin (TAB-A-VINCENT) tablet Take 1 Tab by mouth daily.       BD SINGLE USE SWABS REGULAR padm       BD INSULIN SYRINGE ULTRA-FINE 1 mL 30 x 1/2\" syrg        Patient Active Problem List   Diagnosis Code    Hypertension I10    Hyperlipidemia E78.5    Diabetic retinopathy associated with type 2 diabetes mellitus ([address] E11.319    Diabetic peripheral neuropathy associated with type 2 diabetes mellitus ([facility] E11.42    Charcot's joint arthropathy in type 2 diabetes mellitus ([facility] E11.610    Mass of left breast on mammogram N63.20    ACP (advance care planning) Z71.89    Obesity, morbid ([facility] E66.01    Congestive heart failure ([facility] I50.9     Past Medical History:   Diagnosis Date    Burning with urination     Charcot's joint disease due to secondary diabetes ([facility] 2009    Diabetes ([facility]     [facility]     [facility] retinopathy [facility]     H/O transfusion of packed red blood cells 2009    Hypercholesterolemia     Hypertension      Social History     Socioeconomic History    Marital status:    Tobacco Use    Smoking status: Never Smoker    Smokeless tobacco: Never Used   Substance and Sexual Activity    Alcohol use: Yes     Comment: rare    Drug use: No    Sexual activity: Yes     Partners: Male     Birth control/protection: None     Family History   Problem Relation Age of Onset    Hypertension Mother     Stroke Mother         TIA    OSTEOARTHRITIS Mother     Cancer Mother         cytoblastoma    Hypertension Father     Diabetes Father     Heart Disease Father     Cancer Sister         colon    Diabetes Sister         Review of Systems   Constitutional: Negative for chills, fever, malaise/fatigue and weight loss. Eyes: Negative for blurred vision. Respiratory: Negative for cough, shortness of breath and wheezing. Cardiovascular: Negative for chest pain. Gastrointestinal: Negative for nausea and vomiting. Musculoskeletal: Negative for myalgias. Skin: Negative for rash. Neurological: Negative for weakness. Psychiatric/Behavioral: Negative. Vitals:    07/07/22 0927   BP: (!) 142/58   Pulse: 68   Resp: 18   Temp: (!) 96.2 °F (35.7 °C)   SpO2: 98%   Weight: 175 lb (79.4 kg)       Physical Exam  Vitals and nursing note reviewed. Constitutional:       Appearance: Normal appearance. She is obese. Neck:      Thyroid: No thyromegaly. Cardiovascular:      Rate and Rhythm: Normal rate and regular rhythm. Heart sounds: Normal heart sounds.    Pulmonary: Effort: Pulmonary effort is normal.      Breath sounds: Normal breath sounds. Musculoskeletal:         General: Normal range of motion. Cervical back: Normal range of motion and neck supple. Skin:     General: Skin is warm and dry. Neurological:      General: No focal deficit present. Mental Status: She is alert and oriented to person, place, and time. Psychiatric:         Mood and Affect: Mood normal.         Behavior: Behavior normal.         Thought Content: Thought content normal.         Judgment: Judgment normal.         Assessment/Plan      ICD-10-CM ICD-9-CM    1. Diabetic peripheral neuropathy associated with type 2 diabetes mellitus (HCC)  E11.42 250.60 sevelamer carbonate (RENVELA) 800 mg tab tab     357.2 AMB POC HEMOGLOBIN A1C      DISCONTINUED: sevelamer carbonate (RENVELA) 800 mg tab tab   2. Essential hypertension  I10 401.9 metoprolol tartrate (LOPRESSOR) 25 mg tablet      cloNIDine (Catapres-TTS-1) 0.1 mg/24 hr ptwk      amLODIPine (NORVASC) 10 mg tablet   3. Other hyperlipidemia  E78.49 272.4 atorvastatin (LIPITOR) 40 mg tablet   4. History of CVA (cerebrovascular accident)  Z86.73 V12.54 apixaban (Eliquis) 5 mg tablet     I have discussed the diagnosis with the patient and the intended plan of care as seen in the above orders. The patient has received an after-visit summary and questions were answered concerning future plans. I have discussed medication, side effects, and warnings with the patient in detail. The patient verbalized understanding and is in agreement with the plan of care. The patient will contact the office with any additional concerns.         lab results and schedule of future lab studies reviewed with patient    Varun Hector MD

## 2022-07-26 DIAGNOSIS — Z86.73 HISTORY OF CVA (CEREBROVASCULAR ACCIDENT): ICD-10-CM

## 2022-07-26 DIAGNOSIS — E11.42 DIABETIC PERIPHERAL NEUROPATHY ASSOCIATED WITH TYPE 2 DIABETES MELLITUS (HCC): ICD-10-CM

## 2022-07-26 NOTE — TELEPHONE ENCOUNTER
Called patient to verify her pharmacy, and she   Stated that [facility]  is(Now [address] [facility]) - and per her Dialysis Dr. She needs to take cinacalcet (SENSIPAR) 30 mg tablet 2 pills 0nce a day. Please review and advise.

## 2022-07-27 NOTE — TELEPHONE ENCOUNTER
Per patient her Nephrologists wants her increase her Cinacalcet 30 mg tablet from one tab daily to 2 tabs daily. Medication has been pended with updated sig and qty. Please review and sign if appropriate.

## 2022-07-28 RX ORDER — APIXABAN 5 MG/1
TABLET, FILM COATED ORAL
Qty: 60 TABLET | Refills: 0 | Status: SHIPPED | OUTPATIENT
Start: 2022-07-28 | End: 2022-08-12 | Stop reason: SDUPTHER

## 2022-07-28 RX ORDER — CINACALCET 30 MG/1
60 TABLET, FILM COATED ORAL DAILY
Qty: 180 TABLET | Refills: 3 | Status: SHIPPED | OUTPATIENT
Start: 2022-07-28

## 2022-07-28 RX ORDER — HYDRALAZINE HYDROCHLORIDE 50 MG/1
50 TABLET, FILM COATED ORAL 3 TIMES DAILY
Qty: 90 TABLET | Refills: 2 | Status: SHIPPED | OUTPATIENT
Start: 2022-07-28 | End: 2022-08-12 | Stop reason: SDUPTHER

## 2022-08-11 DIAGNOSIS — Z86.73 HISTORY OF CVA (CEREBROVASCULAR ACCIDENT): ICD-10-CM

## 2022-08-12 RX ORDER — HYDRALAZINE HYDROCHLORIDE 50 MG/1
TABLET, FILM COATED ORAL
Qty: 270 TABLET | Refills: 0 | Status: SHIPPED | OUTPATIENT
Start: 2022-08-12

## 2022-08-12 RX ORDER — APIXABAN 5 MG/1
TABLET, FILM COATED ORAL
Qty: 120 TABLET | Refills: 0 | Status: SHIPPED | OUTPATIENT
Start: 2022-08-12

## 2022-11-22 LAB — LEFT VENTRICULAR EJECTION FRACTION, EXTERNAL: 65

## 2022-12-28 ENCOUNTER — TELEPHONE (OUTPATIENT)
Dept: MAMMOGRAPHY | Age: 67
End: 2022-12-28

## 2023-01-03 DIAGNOSIS — E11.42 DIABETIC PERIPHERAL NEUROPATHY ASSOCIATED WITH TYPE 2 DIABETES MELLITUS (HCC): ICD-10-CM

## 2023-01-03 RX ORDER — FLASH GLUCOSE SCANNING READER
EACH MISCELLANEOUS
Qty: 1 EACH | Refills: 0 | Status: SHIPPED | OUTPATIENT
Start: 2023-01-03 | End: 2023-01-05

## 2023-01-03 NOTE — TELEPHONE ENCOUNTER
This patient contacted office for the following prescriptions to be filled:    Medication requested :   Requested Prescriptions     Pending Prescriptions Disp Refills    flash glucose scanning reader (FreeStyle Griselda 14 Day [place]) misc 1 Each 0     Sig: USE 4 TIMES A DAY AS DIRECTED      PCP: Dr. Rossie Prader or Print: Ryan Contreras  Mail order or Local pharmacy: [phone]    Scheduled appointment if not seen by current providers in office: LOV 7/7/22, next appt  1/10/2023

## 2023-01-05 ENCOUNTER — TELEPHONE (OUTPATIENT)
Dept: FAMILY MEDICINE CLINIC | Age: 68
End: 2023-01-05

## 2023-01-05 DIAGNOSIS — E11.621 TYPE 2 DIABETES MELLITUS WITH FOOT ULCER (CODE) (HCC): Primary | ICD-10-CM

## 2023-01-05 RX ORDER — FLASH GLUCOSE SCANNING READER
EACH MISCELLANEOUS
Qty: 1 EACH | Refills: 0 | Status: SHIPPED | OUTPATIENT
Start: 2023-01-05

## 2023-01-05 RX ORDER — FLASH GLUCOSE SENSOR
KIT MISCELLANEOUS
Qty: 1 KIT | Refills: 5 | Status: SHIPPED | OUTPATIENT
Start: 2023-01-05

## 2023-01-05 NOTE — TELEPHONE ENCOUNTER
Fax received from [address] [place] [address] my meds stating prior Meng Glassing is required for the Dermira Mansfield Device. Submit a PA request  1. Go to key. [employer] and click \"Enter a Key\"  2. Patient last name: Gab Whitfield      : 1955      Key: [id number]  3. Click \"start a PA\", complete the form, and \"send to plan\"          Fax received from [facility] my meds stating prior Meng Glassing is required for the [place] [employer]. Submit a PA request  1. Go to [facility] and click \"Enter a Key\"  2. Patient last name: Gab Whitfield      : 1955      Key: [id number]  3.  Click \"start a PA\", complete the form, and \"send to plan\"

## 2023-01-05 NOTE — TELEPHONE ENCOUNTER
Patient  Mr. Amanda Mack, visit the office today stating that the incorrect Rx was sent in on 01/03/23 for Flash Glucose, he states [facility]. Please review and advise.  Thank you

## 2023-01-09 NOTE — TELEPHONE ENCOUNTER
Attempted PA request via cover my meds this is not covered by patients insurance. She will have to pay out of pocket.

## 2023-01-10 ENCOUNTER — OFFICE VISIT (OUTPATIENT)
Dept: FAMILY MEDICINE CLINIC | Age: 68
End: 2023-01-10
Payer: MEDICARE

## 2023-01-10 VITALS
SYSTOLIC BLOOD PRESSURE: 136 MMHG | BODY MASS INDEX: 27.25 KG/M2 | DIASTOLIC BLOOD PRESSURE: 62 MMHG | HEART RATE: 50 BPM | WEIGHT: 174 LBS | TEMPERATURE: 97.7 F | OXYGEN SATURATION: 100 %

## 2023-01-10 DIAGNOSIS — I10 ESSENTIAL HYPERTENSION: ICD-10-CM

## 2023-01-10 DIAGNOSIS — Z12.31 ENCOUNTER FOR SCREENING MAMMOGRAM FOR MALIGNANT NEOPLASM OF BREAST: Primary | ICD-10-CM

## 2023-01-10 DIAGNOSIS — E78.49 OTHER HYPERLIPIDEMIA: ICD-10-CM

## 2023-01-10 DIAGNOSIS — E11.621 TYPE 2 DIABETES MELLITUS WITH FOOT ULCER (CODE) (HCC): ICD-10-CM

## 2023-01-10 DIAGNOSIS — E11.42 DIABETIC PERIPHERAL NEUROPATHY ASSOCIATED WITH TYPE 2 DIABETES MELLITUS (HCC): ICD-10-CM

## 2023-01-10 DIAGNOSIS — Z86.73 HISTORY OF CVA (CEREBROVASCULAR ACCIDENT): ICD-10-CM

## 2023-01-10 DIAGNOSIS — D69.6 THROMBOCYTOPENIA, UNSPECIFIED (HCC): ICD-10-CM

## 2023-01-10 DIAGNOSIS — I50.22 CHRONIC SYSTOLIC CONGESTIVE HEART FAILURE (HCC): ICD-10-CM

## 2023-01-10 DIAGNOSIS — E11.3593 PROLIFERATIVE DIABETIC RETINOPATHY OF BOTH EYES WITHOUT MACULAR EDEMA ASSOCIATED WITH TYPE 2 DIABETES MELLITUS (HCC): ICD-10-CM

## 2023-01-10 PROCEDURE — 3017F COLORECTAL CA SCREEN DOC REV: CPT | Performed by: FAMILY MEDICINE

## 2023-01-10 PROCEDURE — G8427 DOCREV CUR MEDS BY ELIG CLIN: HCPCS | Performed by: FAMILY MEDICINE

## 2023-01-10 PROCEDURE — G8400 PT W/DXA NO RESULTS DOC: HCPCS | Performed by: FAMILY MEDICINE

## 2023-01-10 PROCEDURE — 3046F HEMOGLOBIN A1C LEVEL >9.0%: CPT | Performed by: FAMILY MEDICINE

## 2023-01-10 PROCEDURE — G8417 CALC BMI ABV UP PARAM F/U: HCPCS | Performed by: FAMILY MEDICINE

## 2023-01-10 PROCEDURE — 3078F DIAST BP <80 MM HG: CPT | Performed by: FAMILY MEDICINE

## 2023-01-10 PROCEDURE — 1101F PT FALLS ASSESS-DOCD LE1/YR: CPT | Performed by: FAMILY MEDICINE

## 2023-01-10 PROCEDURE — 99214 OFFICE O/P EST MOD 30 MIN: CPT | Performed by: FAMILY MEDICINE

## 2023-01-10 PROCEDURE — G8536 NO DOC ELDER MAL SCRN: HCPCS | Performed by: FAMILY MEDICINE

## 2023-01-10 PROCEDURE — G9899 SCRN MAM PERF RSLTS DOC: HCPCS | Performed by: FAMILY MEDICINE

## 2023-01-10 PROCEDURE — 2022F DILAT RTA XM EVC RTNOPTHY: CPT | Performed by: FAMILY MEDICINE

## 2023-01-10 PROCEDURE — 1090F PRES/ABSN URINE INCON ASSESS: CPT | Performed by: FAMILY MEDICINE

## 2023-01-10 PROCEDURE — 3075F SYST BP GE 130 - 139MM HG: CPT | Performed by: FAMILY MEDICINE

## 2023-01-10 PROCEDURE — G8510 SCR DEP NEG, NO PLAN REQD: HCPCS | Performed by: FAMILY MEDICINE

## 2023-01-10 PROCEDURE — 1123F ACP DISCUSS/DSCN MKR DOCD: CPT | Performed by: FAMILY MEDICINE

## 2023-01-10 RX ORDER — AMLODIPINE BESYLATE 10 MG/1
TABLET ORAL
Qty: 90 TABLET | Refills: 3 | Status: SHIPPED | OUTPATIENT
Start: 2023-01-10

## 2023-01-10 RX ORDER — CLONIDINE 0.1 MG/24H
1 PATCH, EXTENDED RELEASE TRANSDERMAL
Qty: 12 PATCH | Refills: 3 | Status: SHIPPED | OUTPATIENT
Start: 2023-01-10

## 2023-01-10 RX ORDER — ATORVASTATIN CALCIUM 40 MG/1
40 TABLET, FILM COATED ORAL DAILY
Qty: 90 TABLET | Refills: 3 | Status: SHIPPED | OUTPATIENT
Start: 2023-01-10

## 2023-01-10 RX ORDER — METOPROLOL TARTRATE 25 MG/1
TABLET, FILM COATED ORAL
Qty: 360 TABLET | Refills: 1 | Status: SHIPPED | OUTPATIENT
Start: 2023-01-10

## 2023-01-10 NOTE — PROGRESS NOTES
1. \"Have you been to the ER, urgent care clinic since your last visit? Hospitalized since your last visit? \" Yes 12/28/22 at [facility] for SOB    2. \"Have you seen or consulted any other health care providers outside of the [address] [place] [address] since your last visit? \" No     3. For patients aged 39-70: Has the patient had a colonoscopy / FIT/ Cologuard? Yes - no Care Gap present      If the patient is female:    4. For patients aged 41-77: Has the patient had a mammogram within the past 2 years? Yes - no Care Gap present      5. For patients aged 21-65: Has the patient had a pap smear?  NA - based on age or sex

## 2023-01-10 NOTE — PROGRESS NOTES
Ada Matias is a 69 y.o. female  presents for follow up HTN lipids renal failure no chest pains or SOB weakness or numbness. No Known Allergies  Outpatient Medications Marked as Taking for the 1/10/23 encounter (Office Visit) with Marcela Tinajero MD   Medication Sig Dispense Refill    flash glucose sensor (FreeStyle Griselda 2 Sensor) kit Use as directed 1 Kit 5    flash glucose scanning reader (FreeStyle Griselda 2 [place]) misc Use as directed. 1 Each 0    Eliquis 5 mg tablet TAKE 1 TABLET TWICE DAILY 120 Tablet 0    hydrALAZINE (APRESOLINE) 50 mg tablet TAKE 1 TABLET THREE TIMES DAILY 270 Tablet 0    cinacalcet (SENSIPAR) 30 mg tablet Take 2 Tablets by mouth in the morning. 180 Tablet 3    metoprolol tartrate (LOPRESSOR) 25 mg tablet TAKE 2 TABLETS TWICE DAILY 360 Tablet 1    cloNIDine (Catapres-TTS-1) 0.1 mg/24 hr ptwk 1 Patch by TransDERmal route every seven (7) days. 12 Patch 3    atorvastatin (LIPITOR) 40 mg tablet Take 1 Tablet by mouth daily. 90 Tablet 3    amLODIPine (NORVASC) 10 mg tablet TAKE 1 TABLET EVERY DAY 90 Tablet 3    sevelamer carbonate (RENVELA) 800 mg tab tab Take 2 tabs po tid 180 Tablet 5    Insulin Needles, Disposable, 31 gauge x 5/16\" ndle Use with pen daily 100 Package 3    Blood-Glucose Sensor (Dexcom G5-G4 Sensor) annette Use daily as directed 1 Device 2    Blood-Glucose Meter,Continuous (Dexcom G5 ) misc Use daily as directed 1 Each 2    aspirin delayed-release 81 mg tablet Take 1 Tab by mouth daily. 100 Tab 1    docusate sodium (COLACE) 50 mg capsule Take 50 mg by mouth as needed. b complex-vitamin c-folic acid (Nephrocaps) 1 mg capsule Take 1 Capsule by mouth daily. glucose blood VI test strips (Accu-Chek Mariel Plus test strp) strip Use bid 100 Strip 2    insulin glargine U-300 conc (TOUJEO) 300 unit/mL (1.5 mL) inpn pen 30 Units by SubCUTAneous route daily. multivitamin (ONE A DAY) tablet Take 1 Tab by mouth daily.       BD SINGLE USE SWABS REGULAR padm BD INSULIN SYRINGE ULTRA-FINE 1 mL 30 x 1/2\" syrg        Patient Active Problem List   Diagnosis Code    Hypertension I10    Hyperlipidemia E78.5    Diabetic retinopathy associated with type 2 diabetes mellitus ([facility] E11.319    Diabetic peripheral neuropathy associated with type 2 diabetes mellitus (HCC) E11.42    Charcot's joint arthropathy in type 2 diabetes mellitus ([facility] E11.610    Mass of left breast on mammogram N63.20    ACP (advance care planning) Z71.89    Obesity, morbid ([address] E66.01    Congestive heart failure ([address] I50.9     Past Medical History:   Diagnosis Date    Burning with urination     Charcot's joint disease due to secondary diabetes ([facility] 2009    Diabetes ([facility]     Diabetic peripheral neuropathy ([facility]     Diabetic retinopathy ([facility]     H/O transfusion of packed red blood cells 2009    Hypercholesterolemia     Hypertension      Social History     Socioeconomic History    Marital status:    Tobacco Use    Smoking status: Never    Smokeless tobacco: Never   Substance and Sexual Activity    Alcohol use: Yes     Comment: rare    Drug use: No    Sexual activity: Yes     Partners: Male     Birth control/protection: None     Family History   Problem Relation Age of Onset    Hypertension Mother     Stroke Mother         TIA    OSTEOARTHRITIS Mother     Cancer Mother         cytoblastoma    Hypertension Father     Diabetes Father     Heart Disease Father     Cancer Sister         colon    Diabetes Sister         Review of Systems   Constitutional:  Negative for chills, fever, malaise/fatigue and weight loss. Eyes:  Negative for blurred vision. Respiratory:  Negative for cough, shortness of breath and wheezing. Cardiovascular:  Negative for chest pain. Gastrointestinal:  Negative for nausea and vomiting. Musculoskeletal:  Negative for myalgias. Skin:  Negative for rash. Neurological:  Negative for weakness. Psychiatric/Behavioral: Negative.        Vitals:    01/10/23 0815   BP: 136/62   Pulse: (!) 50   Temp: 97.7 °F (36.5 °C)   TempSrc: Oral   SpO2: 100%   Weight: 174 lb (78.9 kg)   PainSc:   0 - No pain       Physical Exam  Vitals and nursing note reviewed. Cardiovascular:      Rate and Rhythm: Normal rate and regular rhythm. Pulses: Normal pulses. Heart sounds: Normal heart sounds. Pulmonary:      Effort: Pulmonary effort is normal.      Breath sounds: Normal breath sounds. Musculoskeletal:         General: Normal range of motion. Cervical back: Normal range of motion and neck supple. Skin:     General: Skin is warm and dry. Neurological:      General: No focal deficit present. Mental Status: She is alert and oriented to person, place, and time. Psychiatric:         Mood and Affect: Mood normal.         Behavior: Behavior normal.         Thought Content: Thought content normal.         Judgment: Judgment normal.       Assessment/Plan      ICD-10-CM ICD-9-CM    1. Encounter for screening mammogram for malignant neoplasm of breast  Z12.31 V76.12 [facility] MAMMO BI SCREENING INCL CAD      2. Type 2 diabetes mellitus with foot ulcer (CODE) ([facility]  E11.621 250.80      707.15       3. Proliferative diabetic retinopathy of both eyes without macular edema associated with type 2 diabetes mellitus ([facility]  E11.3593 250.50      362.02       4. Thrombocytopenia, unspecified (HCC)  D69.6 287.5       5. Chronic systolic congestive heart failure (HCC)  I50.22 428.22      428.0       6. Diabetic peripheral neuropathy associated with type 2 diabetes mellitus ([facility])  E11.42 250.60      357.2       7. Essential hypertension  I10 401.9 amLODIPine (NORVASC) 10 mg tablet      cloNIDine (Catapres-TTS-1) 0.1 mg/24 hr ptwk      metoprolol tartrate (LOPRESSOR) 25 mg tablet      8. Other hyperlipidemia  E78.49 272.4 atorvastatin (LIPITOR) 40 mg tablet      9.  History of CVA (cerebrovascular accident)  Z86.73 V12.54 apixaban (Eliquis) 5 mg tablet        I have discussed the diagnosis with the patient and the intended plan of care as seen in the above orders. The patient has received an after-visit summary and questions were answered concerning future plans. I have discussed medication, side effects, and warnings with the patient in detail. The patient verbalized understanding and is in agreement with the plan of care. The patient will contact the office with any additional concerns.       lab results and schedule of future lab studies reviewed with patient    Cornelius Douglas MD

## 2023-02-06 NOTE — TELEPHONE ENCOUNTER
This patient contacted office for the following prescriptions to be filled:    Last office visit: 1/10/23  Follow up appointment: 23 (if overdue call patient to schedule appointment)  Medication requested :   Requested Prescriptions     Pending Prescriptions Disp Refills    insulin glargine U-300 conc (TOUJEO) 300 unit/mL (1.5 mL) inpn pen       Si Units by SubCUTAneous route daily. PCP: Rowdy Luna   Mail order or Local pharmacy name [facility]        Pt states she will be out of the medication soon and uses mail delivery services for her medication which is causing concern for her. Please review and advise as soon as possible.

## 2023-02-07 DIAGNOSIS — Z86.73 HISTORY OF CVA (CEREBROVASCULAR ACCIDENT): ICD-10-CM

## 2023-02-09 RX ORDER — APIXABAN 5 MG/1
TABLET, FILM COATED ORAL
Qty: 180 TABLET | Refills: 0 | Status: SHIPPED | OUTPATIENT
Start: 2023-02-09

## 2023-02-09 NOTE — TELEPHONE ENCOUNTER
This pharmacy faxed over request for the following prescriptions to be filled:    Medication requested :   Requested Prescriptions     Pending Prescriptions Disp Refills    Eliquis 5 mg tablet [Pharmacy Med Name: ELIQUIS 5 MG Tablet] 180 Tablet      Sig: TAKE 1 TABLET TWICE DAILY      PCP: Dr. Wisdom Bread or Print: Chandni  Mail order or Local pharmacy: [phone]    Scheduled appointment if not seen by current providers in office: LOV 1/10/23, next appt 4/11/23

## 2023-02-23 DIAGNOSIS — R92.8 ABNORMAL MAMMOGRAM: Primary | ICD-10-CM

## 2023-02-28 DIAGNOSIS — R92.8 ABNORMAL MAMMOGRAM: Primary | ICD-10-CM

## 2023-03-27 ENCOUNTER — CARE COORDINATION (OUTPATIENT)
Facility: CLINIC | Age: 68
End: 2023-03-27

## 2023-03-27 NOTE — CARE COORDINATION
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 Yes, 23  Discharge Facility: [facility]    Patient was discharged to [facility]. Transition of care outreach postponed for 14 days due to patient's discharge to SNF.       Brenna Diaz RN

## 2023-04-03 ENCOUNTER — CARE COORDINATION (OUTPATIENT)
Facility: CLINIC | Age: 68
End: 2023-04-03

## 2023-04-03 NOTE — CARE COORDINATION
Transition of care outreach postponed for 14 days due to patient's discharge to SNF.   [facility] 3/29-4/1: Acute respiratory failure with hypoxia r/t volume overload in setting of ESRD    SNF: [facility] [phone]    COVID: Negative     AMD: Not on file    PHI: Not on file

## 2023-04-11 PROBLEM — E44.0 MODERATE PROTEIN-CALORIE MALNUTRITION (HCC): Status: ACTIVE | Noted: 2023-04-11

## 2023-04-11 PROBLEM — N25.81 SECONDARY HYPERPARATHYROIDISM OF RENAL ORIGIN (HCC): Status: ACTIVE | Noted: 2023-04-11

## 2023-04-11 PROBLEM — K55.9 VASCULAR DISORDER OF INTESTINE, UNSPECIFIED (HCC): Status: ACTIVE | Noted: 2023-04-11

## 2023-04-17 ENCOUNTER — CARE COORDINATION (OUTPATIENT)
Facility: CLINIC | Age: 68
End: 2023-04-17

## 2023-04-17 NOTE — CARE COORDINATION
Contacted [facility] for SNF follow up. Spoke to Mia. Provided 2 patient identifiers. Patient remains a current admission at this time. Anticipated discharge date 4/17/23. Also spoke with Martín Bae.      Home Health: [facility] (SN/OT/PT)  DME: None

## 2023-04-18 ENCOUNTER — CARE COORDINATION (OUTPATIENT)
Facility: CLINIC | Age: 68
End: 2023-04-18

## 2023-04-18 RX ORDER — BLOOD-GLUCOSE SENSOR
EACH MISCELLANEOUS
COMMUNITY

## 2023-04-18 RX ORDER — FOLIC ACID/VIT B COMPLEX AND C 0.8 MG
1 TABLET ORAL DAILY
COMMUNITY
Start: 2020-08-24

## 2023-04-18 RX ORDER — HYDRALAZINE HYDROCHLORIDE 50 MG/1
TABLET, FILM COATED ORAL
Qty: 270 TABLET | Refills: 0 | Status: SHIPPED | OUTPATIENT
Start: 2023-04-18

## 2023-04-18 NOTE — CARE COORDINATION
[facility] Care Transitions Initial Follow Up Call    Call within 2 business days of discharge: Yes    Patient Current Location:  [place] [address] Transition Nurse contacted the patient by telephone to perform post hospital discharge assessment. Verified name and  with patient as identifiers. Provided introduction to self, and explanation of the Care Transition Nurse role. Patient: Rinku Mcnally Patient : 1955   MRN: [id number]  Reason for Admission: Acute respiratory failure r/t volume overload in setting of ESRD  Discharge Date:   RARS: No data recorded    Last Discharge  [place]       None            Was this an external facility discharge? Yes, 23-23  Discharge Facility: [address] [place] [address] and Rehab    Challenges to be reviewed by the provider   Additional needs identified to be addressed with provider: No  none               Method of communication with provider: chart routing. Patient reported she is well. Care Transition Nurse reviewed red flags with patient who verbalized understanding. The patient was given an opportunity to ask questions and does not have any further questions or concerns at this time. Were discharge instructions available to patient? Yes. Reviewed appropriate site of care based on symptoms and resources available to patient including: PCP  CTN . The patient agrees to contact the PCP office for questions related to their healthcare. Advance Care Planning:   Does patient have an Advance Directive: not on file. Medication reconciliation was performed with patient, who verbalizes understanding of administration of home medications.  Medications reviewed, 1111F entered: yes    Was patient discharged with a pulse oximeter? no    Non-face-to-face services provided:  Obtained and reviewed discharge summary and/or continuity of care documents  CTN provided education on ESRD red flags:  Nausea  Vomiting  Loss of appetite  Fatigue and

## 2023-04-20 RX ORDER — SEVELAMER CARBONATE 800 MG/1
TABLET, FILM COATED ORAL
Qty: 540 TABLET | Refills: 0 | Status: SHIPPED | OUTPATIENT
Start: 2023-04-20

## 2023-04-25 ENCOUNTER — CARE COORDINATION (OUTPATIENT)
Facility: CLINIC | Age: 68
End: 2023-04-25

## 2023-05-02 ENCOUNTER — CARE COORDINATION (OUTPATIENT)
Facility: CLINIC | Age: 68
End: 2023-05-02

## 2023-05-02 NOTE — CARE COORDINATION
[facility] Care Transitions Follow Up Call    Patient Current Location:  [place] [address] Transition Nurse contacted the patient by telephone to follow up after admission on 3/29/23. Verified name and  with patient as identifiers. Patient: Jordan Arriaga  Patient : 1955   MRN: [id number]  Reason for Admission: Acute respiratory failure r/t volume overload in setting of ESRD  Discharge Date:   RARS: No data recorded    Needs to be reviewed by the provider   Additional needs identified to be addressed with provider: No  none             Method of communication with provider: none. Patient reported she is doing good. Did have a low BS over the weekend; 69. Patient voiced she drank some soda to try to get sugar back up and thinks she drunk too much so weight was up a little. Patient reported she has been completing full run of dialysis. Denied any issues with left arm fistula. BP has been good 120/60    Addressed changes since last contact:  none    Did patient attend post hospital follow up?: Yes. Was follow up appointment scheduled within 7 days of discharge? No.      Follow Up  Future Appointments   Date Time Provider Malathi Painter   10/12/2023  8:30 AM Marita Ortiz MD [facility] BS AMB     Non-BS follow up appointment(s): Dr. Ada Shelby  @ 11 AM    Care Transition Nurse reviewed red flags with patient and discussed any barriers to care and/or understanding of plan of care after discharge. Discussed appropriate site of care based on symptoms and resources available to patient including: PCP  CTN . The patient agrees to contact the PCP office for questions related to their healthcare. Advance Care Planning:   Previously addressed .      Patients top risk factors for readmission: medical condition-ESRD  Interventions to address risk factors: Education of patient/family/caregiver/guardian to support self-management-CTN advised patient to purchase glucose tabs to treat low BS as she is on a fluid

## 2023-05-30 ENCOUNTER — OFFICE VISIT (OUTPATIENT)
Facility: CLINIC | Age: 68
End: 2023-05-30
Payer: MEDICARE

## 2023-05-30 VITALS
DIASTOLIC BLOOD PRESSURE: 68 MMHG | HEART RATE: 64 BPM | OXYGEN SATURATION: 97 % | RESPIRATION RATE: 18 BRPM | BODY MASS INDEX: 25.39 KG/M2 | WEIGHT: 158 LBS | TEMPERATURE: 97.8 F | HEIGHT: 66 IN | SYSTOLIC BLOOD PRESSURE: 112 MMHG

## 2023-05-30 DIAGNOSIS — Z01.818 PRE-OP EVALUATION: Primary | ICD-10-CM

## 2023-05-30 DIAGNOSIS — E11.621 TYPE 2 DIABETES MELLITUS WITH FOOT ULCER (CODE) (HCC): ICD-10-CM

## 2023-05-30 PROCEDURE — 1123F ACP DISCUSS/DSCN MKR DOCD: CPT | Performed by: FAMILY MEDICINE

## 2023-05-30 PROCEDURE — G8417 CALC BMI ABV UP PARAM F/U: HCPCS | Performed by: FAMILY MEDICINE

## 2023-05-30 PROCEDURE — 3046F HEMOGLOBIN A1C LEVEL >9.0%: CPT | Performed by: FAMILY MEDICINE

## 2023-05-30 PROCEDURE — 3074F SYST BP LT 130 MM HG: CPT | Performed by: FAMILY MEDICINE

## 2023-05-30 PROCEDURE — 1090F PRES/ABSN URINE INCON ASSESS: CPT | Performed by: FAMILY MEDICINE

## 2023-05-30 PROCEDURE — 2022F DILAT RTA XM EVC RTNOPTHY: CPT | Performed by: FAMILY MEDICINE

## 2023-05-30 PROCEDURE — G8400 PT W/DXA NO RESULTS DOC: HCPCS | Performed by: FAMILY MEDICINE

## 2023-05-30 PROCEDURE — G8427 DOCREV CUR MEDS BY ELIG CLIN: HCPCS | Performed by: FAMILY MEDICINE

## 2023-05-30 PROCEDURE — 3078F DIAST BP <80 MM HG: CPT | Performed by: FAMILY MEDICINE

## 2023-05-30 PROCEDURE — 3017F COLORECTAL CA SCREEN DOC REV: CPT | Performed by: FAMILY MEDICINE

## 2023-05-30 PROCEDURE — 1036F TOBACCO NON-USER: CPT | Performed by: FAMILY MEDICINE

## 2023-05-30 PROCEDURE — 99213 OFFICE O/P EST LOW 20 MIN: CPT | Performed by: FAMILY MEDICINE

## 2023-05-30 ASSESSMENT — ENCOUNTER SYMPTOMS
VOMITING: 0
NAUSEA: 0
RESPIRATORY NEGATIVE: 1
COUGH: 0

## 2023-05-30 ASSESSMENT — ANXIETY QUESTIONNAIRES
5. BEING SO RESTLESS THAT IT IS HARD TO SIT STILL: 0
4. TROUBLE RELAXING: 0
1. FEELING NERVOUS, ANXIOUS, OR ON EDGE: 0
2. NOT BEING ABLE TO STOP OR CONTROL WORRYING: 0
GAD7 TOTAL SCORE: 0
7. FEELING AFRAID AS IF SOMETHING AWFUL MIGHT HAPPEN: 0
3. WORRYING TOO MUCH ABOUT DIFFERENT THINGS: 0
6. BECOMING EASILY ANNOYED OR IRRITABLE: 0

## 2023-05-30 ASSESSMENT — PATIENT HEALTH QUESTIONNAIRE - PHQ9
SUM OF ALL RESPONSES TO PHQ QUESTIONS 1-9: 0
1. LITTLE INTEREST OR PLEASURE IN DOING THINGS: 0
SUM OF ALL RESPONSES TO PHQ9 QUESTIONS 1 & 2: 0
SUM OF ALL RESPONSES TO PHQ QUESTIONS 1-9: 0
2. FEELING DOWN, DEPRESSED OR HOPELESS: 0

## 2023-05-30 NOTE — PROGRESS NOTES
Dickson Jhaveri presents today for   Chief Complaint   Patient presents with    Pre-op Exam       Vitals:    05/30/23 0943   BP: 112/68   Site: Left Upper Arm   Position: Sitting   Pulse: 64   Resp: 18   Temp: 97.8 °F (36.6 °C)   TempSrc: Oral   SpO2: 97%   Weight: 158 lb (71.7 kg)   Height: 5' 6\" (1.676 m)        Is someone accompanying this pt? no    Is the patient using any DME equipment during OV? wheelchair    Depression Screening:  PHQ-9 Questionaire 5/30/2023   Little interest or pleasure in doing things 0   Feeling down, depressed, or hopeless 0   Trouble falling or staying asleep, or sleeping too much -   Feeling tired or having little energy -   Poor appetite or overeating -   Feeling bad about yourself - or that you are a failure or have let yourself or your family down -   Trouble concentrating on things, such as reading the newspaper or watching television -   Moving or speaking so slowly that other people could have noticed. Or the opposite - being so fidgety or restless that you have been moving around a lot more than usual -   PHQ-9 Total Score 0       Abuse Screening: AMB Abuse Screening 5/30/2023   Do you ever feel afraid of your partner? N   Are you in a relationship with someone who physically or mentally threatens you? N   Is it safe for you to go home? Y       Fall Screening  Fall Risk 5/30/2023   2 or more falls in past year? no   Fall with injury in past year?  no       Generalized Anxiety  MIGUEL-7 SCREENING 5/30/2023   Feeling nervous, anxious, or on edge Not at all   Not being able to stop or control worrying Not at all   Worrying too much about different things Not at all   Trouble relaxing Not at all   Being so restless that it is hard to sit still Not at all   Becoming easily annoyed or irritable Not at all   Feeling afraid as if something awful might happen Not at all   MIGUEL-7 Total Score 0        Health Maintenance Due   Topic Date Due    Diabetic Alb to Cr ratio (uACR) test  Never

## 2023-05-30 NOTE — PROGRESS NOTES
Juju Lu is a 77 y.o. female  presents for pre op for left foot surgery    Chief Complaint   Patient presents with    Pre-op Exam        No Known Allergies    Current Outpatient Medications:     sevelamer (RENVELA) 800 MG tablet, TAKE 2 TABLETS THREE TIMES DAILY, Disp: 540 tablet, Rfl: 0    B Complex-C-Folic Acid (DIALYVITE 351) 0.8 MG TABS, Take 1 tablet by mouth daily, Disp: , Rfl:     B Complex-C-Folic Acid (NEPHROCAPS PO), Take 1 capsule by mouth daily, Disp: , Rfl:     Continuous Blood Gluc Sensor (DEXCOM G4 SENSOR) MISC, by Does not apply route, Disp: , Rfl:     hydrALAZINE (APRESOLINE) 50 MG tablet, TAKE 1 TABLET THREE TIMES DAILY, Disp: 270 tablet, Rfl: 0    amLODIPine (NORVASC) 10 MG tablet, TAKE 1 TABLET EVERY DAY, Disp: , Rfl:     apixaban (ELIQUIS) 5 MG TABS tablet, TAKE 1 TABLET TWICE DAILY, Disp: , Rfl:     aspirin 81 MG EC tablet, Take 1 tablet by mouth daily, Disp: , Rfl:     atorvastatin (LIPITOR) 40 MG tablet, Take 1 tablet by mouth daily, Disp: , Rfl:     cinacalcet (SENSIPAR) 30 MG tablet, Take 2 tablets by mouth daily, Disp: , Rfl:     cloNIDine (CATAPRES) 0.1 MG/24HR PTWK, Place 1 patch onto the skin every 7 days, Disp: , Rfl:     insulin glargine, 1 unit dial, (TOUJEO) 300 UNIT/ML concentrated injection pen, Inject 30 Units into the skin daily, Disp: , Rfl:     metoprolol tartrate (LOPRESSOR) 25 MG tablet, TAKE 2 TABLETS TWICE DAILY, Disp: , Rfl:     amiodarone (CORDARONE) 200 MG tablet, Take 200 mg by mouth 2 times daily (with meals) (Patient not taking: Reported on 4/11/2023), Disp: , Rfl:     vitamin D 25 MCG (1000 UT) CAPS, Take by mouth (Patient not taking: Reported on 4/18/2023), Disp: , Rfl:     docusate sodium (COLACE) 50 MG capsule, Take 50 mg by mouth as needed (Patient not taking: Reported on 4/11/2023), Disp: , Rfl:    Patient Active Problem List   Diagnosis    Hyperlipidemia    Hypertension    Obesity, morbid ([facility]    ACP (advance care planning)    Congestive heart

## 2023-10-16 RX ORDER — CLONIDINE 0.1 MG/24H
PATCH, EXTENDED RELEASE TRANSDERMAL
Qty: 12 PATCH | Refills: 10 | Status: SHIPPED | OUTPATIENT
Start: 2023-10-16

## 2023-10-16 RX ORDER — ATORVASTATIN CALCIUM 40 MG/1
40 TABLET, FILM COATED ORAL DAILY
Qty: 90 TABLET | Refills: 10 | Status: SHIPPED | OUTPATIENT
Start: 2023-10-16

## 2023-11-08 RX ORDER — APIXABAN 5 MG/1
5 TABLET, FILM COATED ORAL 2 TIMES DAILY
Qty: 180 TABLET | Refills: 1 | Status: SHIPPED | OUTPATIENT
Start: 2023-11-08 | End: 2023-11-09 | Stop reason: SDUPTHER

## 2023-11-09 ENCOUNTER — OFFICE VISIT (OUTPATIENT)
Facility: CLINIC | Age: 68
End: 2023-11-09
Payer: MEDICARE

## 2023-11-09 VITALS
BODY MASS INDEX: 27.55 KG/M2 | OXYGEN SATURATION: 100 % | HEART RATE: 59 BPM | DIASTOLIC BLOOD PRESSURE: 65 MMHG | WEIGHT: 171.4 LBS | HEIGHT: 66 IN | SYSTOLIC BLOOD PRESSURE: 131 MMHG

## 2023-11-09 DIAGNOSIS — K55.9 VASCULAR DISORDER OF INTESTINE, UNSPECIFIED (HCC): ICD-10-CM

## 2023-11-09 DIAGNOSIS — I10 ESSENTIAL (PRIMARY) HYPERTENSION: ICD-10-CM

## 2023-11-09 DIAGNOSIS — E78.49 OTHER HYPERLIPIDEMIA: ICD-10-CM

## 2023-11-09 DIAGNOSIS — E11.621 TYPE 2 DIABETES MELLITUS WITH FOOT ULCER (CODE) (HCC): Primary | ICD-10-CM

## 2023-11-09 LAB — HBA1C MFR BLD: 7 %

## 2023-11-09 PROCEDURE — G8484 FLU IMMUNIZE NO ADMIN: HCPCS | Performed by: FAMILY MEDICINE

## 2023-11-09 PROCEDURE — 99214 OFFICE O/P EST MOD 30 MIN: CPT | Performed by: FAMILY MEDICINE

## 2023-11-09 PROCEDURE — 2022F DILAT RTA XM EVC RTNOPTHY: CPT | Performed by: FAMILY MEDICINE

## 2023-11-09 PROCEDURE — G8417 CALC BMI ABV UP PARAM F/U: HCPCS | Performed by: FAMILY MEDICINE

## 2023-11-09 PROCEDURE — 3017F COLORECTAL CA SCREEN DOC REV: CPT | Performed by: FAMILY MEDICINE

## 2023-11-09 PROCEDURE — G8400 PT W/DXA NO RESULTS DOC: HCPCS | Performed by: FAMILY MEDICINE

## 2023-11-09 PROCEDURE — 1090F PRES/ABSN URINE INCON ASSESS: CPT | Performed by: FAMILY MEDICINE

## 2023-11-09 PROCEDURE — 1123F ACP DISCUSS/DSCN MKR DOCD: CPT | Performed by: FAMILY MEDICINE

## 2023-11-09 PROCEDURE — 1036F TOBACCO NON-USER: CPT | Performed by: FAMILY MEDICINE

## 2023-11-09 PROCEDURE — 3075F SYST BP GE 130 - 139MM HG: CPT | Performed by: FAMILY MEDICINE

## 2023-11-09 PROCEDURE — 83036 HEMOGLOBIN GLYCOSYLATED A1C: CPT | Performed by: FAMILY MEDICINE

## 2023-11-09 PROCEDURE — G8427 DOCREV CUR MEDS BY ELIG CLIN: HCPCS | Performed by: FAMILY MEDICINE

## 2023-11-09 PROCEDURE — 3078F DIAST BP <80 MM HG: CPT | Performed by: FAMILY MEDICINE

## 2023-11-09 PROCEDURE — 3046F HEMOGLOBIN A1C LEVEL >9.0%: CPT | Performed by: FAMILY MEDICINE

## 2023-11-09 RX ORDER — CALCIUM ACETATE 667 MG/1
2 CAPSULE ORAL 2 TIMES DAILY
Qty: 180 CAPSULE | Refills: 3 | Status: SHIPPED | OUTPATIENT
Start: 2023-11-09

## 2023-11-09 RX ORDER — HYDRALAZINE HYDROCHLORIDE 50 MG/1
50 TABLET, FILM COATED ORAL 3 TIMES DAILY
Qty: 270 TABLET | Refills: 1 | Status: SHIPPED | OUTPATIENT
Start: 2023-11-09

## 2023-11-09 RX ORDER — CLONIDINE 0.1 MG/24H
PATCH, EXTENDED RELEASE TRANSDERMAL
Qty: 12 PATCH | Refills: 10 | Status: SHIPPED | OUTPATIENT
Start: 2023-11-09

## 2023-11-09 RX ORDER — ATORVASTATIN CALCIUM 40 MG/1
40 TABLET, FILM COATED ORAL DAILY
Qty: 90 TABLET | Refills: 10 | Status: SHIPPED | OUTPATIENT
Start: 2023-11-09

## 2023-11-09 RX ORDER — FOLIC ACID/VIT B COMPLEX AND C 0.8 MG
1 TABLET ORAL DAILY
Qty: 100 TABLET | Refills: 1 | Status: SHIPPED | OUTPATIENT
Start: 2023-11-09

## 2023-11-09 RX ORDER — AMLODIPINE BESYLATE 10 MG/1
10 TABLET ORAL DAILY
Qty: 90 TABLET | Refills: 1 | Status: SHIPPED | OUTPATIENT
Start: 2023-11-09

## 2023-11-09 ASSESSMENT — ENCOUNTER SYMPTOMS
RESPIRATORY NEGATIVE: 1
COUGH: 0
NAUSEA: 0
VOMITING: 0

## 2023-11-09 ASSESSMENT — PATIENT HEALTH QUESTIONNAIRE - PHQ9
2. FEELING DOWN, DEPRESSED OR HOPELESS: 0
1. LITTLE INTEREST OR PLEASURE IN DOING THINGS: 0
SUM OF ALL RESPONSES TO PHQ QUESTIONS 1-9: 0
SUM OF ALL RESPONSES TO PHQ9 QUESTIONS 1 & 2: 0
SUM OF ALL RESPONSES TO PHQ QUESTIONS 1-9: 0

## 2023-11-09 NOTE — PROGRESS NOTES
Jocelin Villalpando is a 77 y.o. female  presents for   Chief Complaint   Patient presents with    6 Month Follow-Up        No Known Allergies    Current Outpatient Medications:     B Complex-C-Folic Acid (DIALYVITE 815) 0.8 MG TABS, Take 1 tablet by mouth daily, Disp: 100 tablet, Rfl: 1    calcium acetate (PHOSLO) 667 MG CAPS capsule, Take 2 capsules by mouth 2 times daily, Disp: 180 capsule, Rfl: 3    hydrALAZINE (APRESOLINE) 50 MG tablet, Take 1 tablet by mouth 3 times daily, Disp: 270 tablet, Rfl: 1    apixaban (ELIQUIS) 5 MG TABS tablet, Take 1 tablet by mouth 2 times daily, Disp: 180 tablet, Rfl: 1    cloNIDine (CATAPRES) 0.1 MG/24HR PTWK, APPLY 1 PATCH THE SKIN EVERY 7 DAYS, Disp: 12 patch, Rfl: 10    atorvastatin (LIPITOR) 40 MG tablet, Take 1 tablet by mouth daily, Disp: 90 tablet, Rfl: 10    amLODIPine (NORVASC) 10 MG tablet, Take 1 tablet by mouth daily, Disp: 90 tablet, Rfl: 1    Continuous Blood Gluc Sensor (DEXCOM G4 SENSOR) MISC, by Does not apply route, Disp: , Rfl:     aspirin 81 MG EC tablet, Take 1 tablet by mouth daily, Disp: , Rfl:     cinacalcet (SENSIPAR) 30 MG tablet, Take 2 tablets by mouth daily, Disp: , Rfl:     insulin glargine, 1 unit dial, (TOUJEO) 300 UNIT/ML concentrated injection pen, Inject 30 Units into the skin daily, Disp: , Rfl:     metoprolol tartrate (LOPRESSOR) 25 MG tablet, TAKE 2 TABLETS TWICE DAILY, Disp: , Rfl:     B Complex-C-Folic Acid (NEPHROCAPS PO), Take 1 capsule by mouth daily (Patient not taking: Reported on 11/9/2023), Disp: , Rfl:     amiodarone (CORDARONE) 200 MG tablet, Take 200 mg by mouth 2 times daily (with meals) (Patient not taking: Reported on 4/11/2023), Disp: , Rfl:     vitamin D 25 MCG (1000 UT) CAPS, Take by mouth (Patient not taking: Reported on 4/18/2023), Disp: , Rfl:     docusate sodium (COLACE) 50 MG capsule, Take 50 mg by mouth as needed (Patient not taking: Reported on 4/11/2023), Disp: , Rfl:    Patient Active Problem List   Diagnosis

## 2024-01-24 ENCOUNTER — TELEPHONE (OUTPATIENT)
Facility: CLINIC | Age: 69
End: 2024-01-24

## 2024-01-24 NOTE — TELEPHONE ENCOUNTER
Patient called requesting a call back from clinical or Edmund Nassar regarding  a medication change, Please review and advise as soon as possible.

## 2024-01-25 RX ORDER — SEVELAMER HYDROCHLORIDE 800 MG/1
TABLET, FILM COATED ORAL
Qty: 390 TABLET | Refills: 3 | Status: SHIPPED | OUTPATIENT
Start: 2024-01-25

## 2024-01-25 RX ORDER — CINACALCET 30 MG/1
60 TABLET, FILM COATED ORAL DAILY
Qty: 90 TABLET | Refills: 1 | Status: SHIPPED | OUTPATIENT
Start: 2024-01-25

## 2024-01-25 NOTE — TELEPHONE ENCOUNTER
Spoke with patient, states that her dialysis doctor wants her to stop taking the calcium gluconate and to restart taking her Sevelamer because it is a good binder for her, keeps her phosphorus levels low. She is having issues with her phosphorus levels high. He is instructing her to take 3 tabs with every meal and 2 tabs with her snacks twice a day.     Spoke with provider who ok'd all medications prior to rx being sent in.

## 2024-02-01 NOTE — TELEPHONE ENCOUNTER
Received a refill request from [facility] in [place] for Metolazone 2.5mg. Doesn't look like Dr. Portia Draper prescribed this. Please advise. What Type Of Note Output Would You Prefer (Optional)?: Standard Output How Severe Are Your Spot(S)?: mild Have Your Spot(S) Been Treated In The Past?: has not been treated Hpi Title: Evaluation of Skin Lesions

## 2024-02-04 DIAGNOSIS — K55.9 VASCULAR DISORDER OF INTESTINE, UNSPECIFIED (HCC): ICD-10-CM

## 2024-02-05 RX ORDER — APIXABAN 5 MG/1
5 TABLET, FILM COATED ORAL 2 TIMES DAILY
Qty: 180 TABLET | Refills: 1 | Status: SHIPPED | OUTPATIENT
Start: 2024-02-05

## 2024-02-12 RX ORDER — INSULIN GLARGINE 300 U/ML
INJECTION, SOLUTION SUBCUTANEOUS
Qty: 6 EACH | Refills: 3 | Status: SHIPPED | OUTPATIENT
Start: 2024-02-12

## 2024-03-13 ENCOUNTER — CLINICAL DOCUMENTATION (OUTPATIENT)
Facility: CLINIC | Age: 69
End: 2024-03-13

## (undated) DEVICE — FLEX ADVANTAGE 1500CC: Brand: FLEX ADVANTAGE

## (undated) DEVICE — REM POLYHESIVE ADULT PATIENT RETURN ELECTRODE: Brand: VALLEYLAB

## (undated) DEVICE — CATH IV SAFE STR 22GX1IN BLU -- PROTECTIV PLUS

## (undated) DEVICE — SNARE POLYP M W27MMXL240CM OVL STIFF DISP CAPTIVATOR

## (undated) DEVICE — Device

## (undated) DEVICE — AIRLIFE™ NASAL OXYGEN CANNULA CURVED, NONFLARED TIP WITH 14 FOOT (4.3 M) CRUSH-RESISTANT TUBING, OVER-THE-EAR STYLE: Brand: AIRLIFE™

## (undated) DEVICE — FORCEPS BX L240CM JAW DIA2.8MM L CAP W/ NDL MIC MESH TOOTH

## (undated) DEVICE — TRAP SPEC COLL POLYP POLYSTYR --

## (undated) DEVICE — MEDI-VAC NON-CONDUCTIVE SUCTION TUBING: Brand: CARDINAL HEALTH